# Patient Record
Sex: FEMALE | Race: WHITE | NOT HISPANIC OR LATINO | Employment: OTHER | URBAN - METROPOLITAN AREA
[De-identification: names, ages, dates, MRNs, and addresses within clinical notes are randomized per-mention and may not be internally consistent; named-entity substitution may affect disease eponyms.]

---

## 2017-01-09 DIAGNOSIS — N18.30 CHRONIC KIDNEY DISEASE, STAGE III (MODERATE) (HCC): ICD-10-CM

## 2017-03-29 ENCOUNTER — ALLSCRIPTS OFFICE VISIT (OUTPATIENT)
Dept: OTHER | Facility: OTHER | Age: 72
End: 2017-03-29

## 2017-03-29 DIAGNOSIS — J44.9 CHRONIC OBSTRUCTIVE PULMONARY DISEASE (HCC): ICD-10-CM

## 2017-04-06 ENCOUNTER — HOSPITAL ENCOUNTER (OUTPATIENT)
Dept: RADIOLOGY | Age: 72
Discharge: HOME | End: 2017-04-06
Payer: COMMERCIAL

## 2017-04-06 DIAGNOSIS — J44.9 CHRONIC OBSTRUCTIVE PULMONARY DISEASE (HCC): ICD-10-CM

## 2017-05-01 ENCOUNTER — TRANSCRIBE ORDERS (OUTPATIENT)
Dept: ADMINISTRATIVE | Facility: HOSPITAL | Age: 72
End: 2017-05-01

## 2017-05-01 ENCOUNTER — ALLSCRIPTS OFFICE VISIT (OUTPATIENT)
Dept: OTHER | Facility: OTHER | Age: 72
End: 2017-05-01

## 2017-05-01 DIAGNOSIS — Z12.31 ENCOUNTER FOR SCREENING MAMMOGRAM FOR MALIGNANT NEOPLASM OF BREAST: Primary | ICD-10-CM

## 2017-08-14 ENCOUNTER — ALLSCRIPTS OFFICE VISIT (OUTPATIENT)
Dept: OTHER | Facility: OTHER | Age: 72
End: 2017-08-14

## 2017-08-14 DIAGNOSIS — N18.30 CHRONIC KIDNEY DISEASE, STAGE III (MODERATE) (HCC): ICD-10-CM

## 2017-10-30 ENCOUNTER — HOSPITAL ENCOUNTER (OUTPATIENT)
Dept: RADIOLOGY | Age: 72
Discharge: HOME | End: 2017-10-30
Payer: COMMERCIAL

## 2017-10-30 DIAGNOSIS — Z12.31 ENCOUNTER FOR SCREENING MAMMOGRAM FOR MALIGNANT NEOPLASM OF BREAST: ICD-10-CM

## 2017-10-30 PROCEDURE — G0202 SCR MAMMO BI INCL CAD: HCPCS

## 2017-11-08 ENCOUNTER — ALLSCRIPTS OFFICE VISIT (OUTPATIENT)
Dept: OTHER | Facility: OTHER | Age: 72
End: 2017-11-08

## 2017-11-09 NOTE — PROGRESS NOTES
Assessment  1  Chronic kidney disease, stage 3a (585 3) (N18 3)   2  Benign hypertension (401 1) (I10)   3  Renal cyst, acquired, right (593 2) (N28 1)    Plan  Benign hypertension    · [address] [place]; Status:Hold For - Scheduling; Requested for:[id number]; Perform:[facility]; XUS:[id number];FIMRFYE; For:Benign hypertension; Ordered By:Alta Haider;  Chronic kidney disease, stage 3a    · (1) BASIC METABOLIC PROFILE; Status:Active; Requested CQV:[id number];    Perform:[facility]; GBK:82YAK1043; Ordered;kidney disease, stage 3a; Ordered By:Alta Haider;    Discussion/Summary    Elevated sCr likely d/t chronic kidney disease, stage 3a per CKD EPI equation (eGFR 51ml/min) - latest sCr in Sept 2017 1 19 and stable since April 2017UA shows pH 8,  leukocytes, rare RBC, 3-5 WBCultrasound shows normal kidneys with right renal cystmay be due to HTN nephrosclerosis +/- physiologic CKD of agingprotein:Cr 0 18, FLC 1 55 - acceptable for CKD, SPEP/UPEP negative for monoclonal gammopathyno significant proteinuria or hematuria on UA, will defer glomerular work uppatient to drink to thirst to avoid dehydration in light of HCTZ use  renal cyst, acquired - monitor with repeat ultrasound in 12 months (February 2018)  - BP well controlled, continue diltiazem XR 240mg daily, hydrochlorothiazide 25mg daily, lisinopril 5mg daily  BMD - Mag/phos/PTH/25(OH) vitamin D levels acceptable  Continue vitamin D3 800u daily  - on steroid taper now for cough  She is s/p augmentin x 7 days  She is improving slowly  Will see pulm if no improvement  in 4 months  Obtain renal ultrasound and bloodwork prior to next appointment  The patient was counseled regarding diagnostic results,-- instructions for management,-- prognosis  Patient is able to Self-Care  Possible side effects of new medications were reviewed with the patient/guardian today  The treatment plan was reviewed with the patient/guardian   The patient/guardian understands and agrees with the treatment plan   She has no barriers to learning  Indication for Services: CKD Stage 3  CKD Teaching includes hypertension management, Avoid nephrotoxic medication and sodium restriction  Discussed with the patient      Reason For Visit  CKD3, HTN, R renal cyst      History of Present Illness  72 yo F with hx of HTN, right renal cyst, HLD, COPD, GIST presents in follow up of CKD stage 3   she feels ok but has a cold  Is on prednisone to stop her runny nose  Also has a cough  The mucus is clear now  Not sure if she is having an allergy reaction or not  On 2nd day of 7 day pack of steroids  recent hospitalizations since last office visit  LT4 dose reduced to 88 from 112 mcg daily  Takes only 1 calcium tablet a day instead of 2  NSAID use regularly  Uses it very sparingly  She does like aleve  - BP an issue  Usually SBP 130s  Review of Systems   Constitutional: no fever-- and-- no chills  Integumentary: no rashes  Gastrointestinal: no abdominal pain,-- no constipation,-- no nausea,-- no diarrhea-- and-- no vomiting  Respiratory: shortness of breath,-- cough,-- as per HPI, SOB at times with a cold-- and-- coughing up sputum  Cardiovascular: no chest pain-- and-- no lower extremity edema  Musculoskeletal: +headache over the past 2 days, but-- no joint pain-- and-- no joint swelling  Neurological: headache, but-- no lightheadedness-- and-- no dizziness  Genitourinary: no dysuria-- and-- no hematuria  Eyes: no eyesight problems  ENT: hearing loss  Psychiatric: no anxiety-- and-- no depression  ROS reviewed  Past Medical History    The active problems and past medical history were reviewed and updated today  Surgical History    The surgical history was reviewed and updated today  Family History    The family history was reviewed and updated today  Social History  The social history was reviewed and updated today   The social history was reviewed and is unchanged  Current Meds   1  Calcium 600 TABS; TAKE 1 TABLET DAILY; Therapy: (Recorded:08Nov2017) to Recorded   2  Co Q 10 100 MG Oral Capsule; TAKE 1 CAPSULE TWICE DAILY; Therapy: (Recorded:01Nov2016) to Recorded   3  Dilt- MG Oral Capsule Extended Release 24 Hour; TAKE 1 CAPSULE DAILY; Therapy: (Recorded:01Nov2016) to Recorded   4  Fexofenadine HCl - 180 MG Oral Tablet; TAKE 1 TABLET DAILY; Therapy: (Recorded:01Nov2016) to Recorded   5  HydroCHLOROthiazide 25 MG Oral Tablet; TAKE 1 TABLET DAILY; Therapy: (Recorded:01Nov2016) to Recorded   6  Levoxyl 88 MCG Oral Tablet; TAKE 1 TABLET DAILY; Therapy: [phone]) to Recorded   7  Lisinopril 5 MG Oral Tablet; Take 1 tablet daily; Therapy: (Recorded:01Nov2016) to Recorded   8  Lovastatin 20 MG Oral Tablet; TAKE 1 TABLET DAILY; Therapy: (Recorded:01Nov2016) to Recorded   9  Montelukast Sodium 10 MG Oral Tablet; Take 1 tablet daily; Therapy: 77APM3823 to ([id number])  Requested for: 75ECZ3297; Last Rx:29Mar2017 Ordered   10  ProAir  (90 Base) MCG/ACT Inhalation Aerosol Solution; INHALE 2 PUFFS Q 4H  AND AS NEEDED; Therapy: 72JQI4186 to ([id number])  Requested for: 21YMC3933; Last  Rx:29Mar2017 Ordered   11  Super B Complex/C CAPS; TAKE 1 CAPSULE DAILY; Therapy: (Recorded:01Nov2016) to Recorded   12  Symbicort 160-4 5 MCG/ACT Inhalation Aerosol; INHALE 2 PUFFS TWICE DAILY  RINSE  MOUTH AFTER USE; Therapy: 15EKA0062 to (DHCCCTAQ:41BKB9889)  Requested for: 78QOO1510; Last  Rx:29Mar2017 Ordered   13  Vitamin D3 CAPS; 800 IU 2 daily; Therapy: (Recorded:18Zxu2650) to Recorded    The medication list was reviewed and updated today  Allergies  1   No Known Drug Allergies    Vitals  Vital Signs    Recorded: 97ETX7397 08:48AM   Heart Rate 80   Systolic 237, Sitting   Diastolic 78, Sitting   Height 5 ft 4 in   Weight 195 lb    BMI Calculated 33 47   BSA Calculated 1 94       Physical Exam   Constitutional: General appearance: No acute distress, well appearing and well nourished  ENT: External ears and nose appear normal     Eyes: Anicteric sclerae  Pulmonary: Respiratory effort: No increased work of breathing or signs of respiratory distress  -- Auscultation of lungs: Abnormal  -- coarse BS b/l improved upon deep cough  Cardiovascular: Auscultation of heart: Normal rate and rhythm, normal S1 and S2, without murmurs  Abdomen: Non-tender, no masses  Extremities: Extremities are abnormal--   b/l LE edema  Rash: No rash present  Neurologic: Non Focal     Psychiatric: Orientation to person, place, and time: Normal  -- and-- Mood and affect: Normal        Results/Data  * MAMMO SCREENING BILATERAL W CAD 30Oct2017 08:54AM Jerrod Maier Order Number: [id number]  Performing Comments: [facility]  10/30/17 at [facility] 30 15 minutes early  - Patient Instructions: To schedule this appointment, please contact Central Scheduling at [phone]  Do not wear any perfume, powder, lotion or deodorant on breast or underarm area  Please bring your doctors order, referral (if needed) and insurance information with you on the day of the test  Failure to bring this information may result in this test being rescheduled  Arrive 15 minutes prior to your appointment time to register  On the day of your test, please bring any prior mammogram or breast studies with you that were not performed at a [facility]  Failure to bring prior exams may result in your test needing to be rescheduled  Test Name Result Flag Reference   MAMMO SCREENING BILATERAL W CAD (Report)       Patient History:  Patient is postmenopausal, has 2 history of hyperplasia atypia at  age 78, has history of high-risk lesion on a previous biopsy at   age 73, has history of LCIS at age 73, has history of hyperplasia  atypia at age 73, and has history of other cancer at age 65  No known family history of cancer    High risk lumpectomy of the left breast, July 24, 2012  Pathology Report of both breasts, July 24, 2012  High risk WB   stereo brst biopsy of the left breast, June 22, 2012  Pathology   Report of both breasts, June 22, 2012  Took hormonal contraceptives for 4 years  Took estrogen for 3   years  Patient is a former smoker, and smoked for 49 years  Patient's   BMI is 31 0    Reason for exam: screening, asymptomatic  Mammo Screening Bilateral W CAD: October 30, 2017 - Check In #:   [de-identified]  Bilateral MLO and CC view(s) were taken  Technologist: DENIS Ayala (R)(M)  Prior study comparison: October 27, 2016, mammo screening   bilateral W CAD performed at [address]  October 26, 2015, digital bilateral screening mammogram performed  at [address]  October 22, 2014, digital   bilateral screening mammogram performed at [facility]  September 27, 2013, digital bilateral screening mammogram  performed at [address]  June 22, 2012,   bilateral Follow-Up, performed at [address]  June 19, 2012, left breast unilateral diagnostic   mammogram, performed at [address]  June 13, 2012, digital bilateral screening mammogram performed at [facility]  The breast tissue is almost entirely fat  No dominant soft tissue mass, architectural distortion or   suspicious calcifications are noted in either breast  The skin   and nipple structures are within normal limits  Scattered benign  appearing calcifications are noted  No mammographic evidence of malignancy  No significant changes when compared with prior studies  ACR BI-RADSï¾® Assessments: BiRad:1 - Negative   Recommendation:  Routine screening mammogram of both breasts in 1 year  Analyzed by CAD   The patient is scheduled in a reminder system for screening   mammography  8-10% of cancers will be missed on mammography   Management of a   palpable abnormality must be based on clinical grounds  Patients  will be notified of their results via letter from our facility  Accredited by [employer] of Radiology and FDA     Transcription Location: DENIS Bowie 98: [id number]   Risk Value(s):  Pepito 10 Year: 29 900%, [place] Lifetime: 40 600%,   Myriad Table: 1 5%  Signed by:  Chao Pulido MD  10/30/17         Signatures   Electronically signed by : Alphonse Constantino DO; Nov 8 2017  9:18AM EST                       (Author)

## 2017-11-10 ENCOUNTER — GENERIC CONVERSION - ENCOUNTER (OUTPATIENT)
Dept: OTHER | Facility: OTHER | Age: 72
End: 2017-11-10

## 2018-01-13 VITALS
SYSTOLIC BLOOD PRESSURE: 132 MMHG | HEART RATE: 80 BPM | BODY MASS INDEX: 33.29 KG/M2 | DIASTOLIC BLOOD PRESSURE: 78 MMHG | HEIGHT: 64 IN | WEIGHT: 195 LBS

## 2018-01-13 VITALS
WEIGHT: 195 LBS | DIASTOLIC BLOOD PRESSURE: 74 MMHG | BODY MASS INDEX: 33.29 KG/M2 | HEART RATE: 76 BPM | RESPIRATION RATE: 20 BRPM | SYSTOLIC BLOOD PRESSURE: 132 MMHG | HEIGHT: 64 IN

## 2018-01-13 VITALS
SYSTOLIC BLOOD PRESSURE: 110 MMHG | OXYGEN SATURATION: 94 % | TEMPERATURE: 98.3 F | HEART RATE: 78 BPM | WEIGHT: 190 LBS | RESPIRATION RATE: 16 BRPM | BODY MASS INDEX: 32.44 KG/M2 | HEIGHT: 64 IN | DIASTOLIC BLOOD PRESSURE: 70 MMHG

## 2018-01-13 NOTE — MISCELLANEOUS
Message  Called patient to discuss rescheduling of appointment  Patient questions the need for follow up  Explained to patient her diagnosis and risk based on age and pathology  Patient prefers yearly visit  Appointment given for 5/3/2018 for patient to see Dr Ben Salas  Active Problems    1  Acute bronchitis, unspecified organism (466 0) (J20 9)   2  Allergic rhinitis (477 9) (J30 9)   3  Atypical ductal hyperplasia of breast (610 8) (N60 99)   4  Benign hypertension (401 1) (I10)   5  Chronic kidney disease, stage 3a (585 3) (N18 3)   6  Chronic obstructive pulmonary disease (496) (J44 9)   7  Hypercholesteremia (272 0) (E78 00)   8  Hypothyroidism (244 9) (E03 9)   9  Lobular carcinoma of left breast (174 9) (C50 912)   10  Nocturia (788 43) (R35 1)   11  Renal cyst, acquired, right (593 2) (N28 1)   12  Sciatica (724 3) (M54 30)   13  Urinary frequency (788 41) (R35 0)    Current Meds   1  Calcium 600 TABS; TAKE 1 TABLET DAILY; Therapy: (Recorded:08Nov2017) to Recorded   2  Co Q 10 100 MG Oral Capsule; TAKE 1 CAPSULE TWICE DAILY; Therapy: (Recorded:01Nov2016) to Recorded   3  Dilt- MG Oral Capsule Extended Release 24 Hour; TAKE 1 CAPSULE DAILY; Therapy: (Recorded:01Nov2016) to Recorded   4  Fexofenadine HCl - 180 MG Oral Tablet; TAKE 1 TABLET DAILY; Therapy: (Recorded:01Nov2016) to Recorded   5  HydroCHLOROthiazide 25 MG Oral Tablet; TAKE 1 TABLET DAILY; Therapy: (Recorded:01Nov2016) to Recorded   6  Levoxyl 88 MCG Oral Tablet; TAKE 1 TABLET DAILY; Therapy: ([phone]) to Recorded   7  Lisinopril 5 MG Oral Tablet; Take 1 tablet daily; Therapy: (Recorded:01Nov2016) to Recorded   8  Lovastatin 20 MG Oral Tablet; TAKE 1 TABLET DAILY; Therapy: (Recorded:01Nov2016) to Recorded   9  Montelukast Sodium 10 MG Oral Tablet (Singulair); Take 1 tablet daily; Therapy: 92IVN3707 to (Rocky Razo)  Requested for: 00YPW2303; Last   Rx:14Wmk5217 Ordered   10   ProAir  (90 Base) MCG/ACT Inhalation Aerosol Solution; INHALE 2 PUFFS Q 4H    AND AS NEEDED; Therapy: 42NEN9793 to ([phone])  Requested for: 29CSX6260; Last    Rx:29Mar2017 Ordered   11  Super B Complex/C CAPS; TAKE 1 CAPSULE DAILY; Therapy: (Recorded:01Nov2016) to Recorded   12  Symbicort 160-4 5 MCG/ACT Inhalation Aerosol; INHALE 2 PUFFS TWICE DAILY  RINSE MOUTH AFTER USE; Therapy: 31KTO5953 to ([facility]:02LWN7636)  Requested for: 09DTS4173; Last    Rx:29Mar2017 Ordered   13  Vitamin D3 CAPS; 800 IU 2 daily; Therapy: (Recorded:98Avw7858) to Recorded    Allergies    1   No Known Drug Allergies    Signatures   Electronically signed by : Bob Pike, ; Nov 10 2017  9:25AM EST                       (Author)

## 2018-01-14 VITALS
WEIGHT: 195 LBS | DIASTOLIC BLOOD PRESSURE: 78 MMHG | HEART RATE: 76 BPM | TEMPERATURE: 97.8 F | HEIGHT: 64 IN | SYSTOLIC BLOOD PRESSURE: 114 MMHG | RESPIRATION RATE: 20 BRPM | BODY MASS INDEX: 33.29 KG/M2

## 2018-02-05 DIAGNOSIS — I10 ESSENTIAL (PRIMARY) HYPERTENSION: ICD-10-CM

## 2018-02-20 ENCOUNTER — TELEPHONE (OUTPATIENT)
Dept: NEPHROLOGY | Facility: CLINIC | Age: 73
End: 2018-02-20

## 2018-02-20 NOTE — TELEPHONE ENCOUNTER
----- Message from Anthony Dorman DO sent at 2/19/2018  2:15 PM EST -----  This patient should have renal ultrasound performed if he has not already done so within the past week as it is overdue  Thank you!    -Alta     ----- Message -----  From: SYSTEM  Sent: 2/10/2018  12:05 AM  To:  Anthony Dorman DO

## 2018-02-20 NOTE — TELEPHONE ENCOUNTER
Patient states she has not scheduled the [address] yet   Her  has been in the hospital  She will call us once she schedule's the [address]

## 2018-02-26 ENCOUNTER — HOSPITAL ENCOUNTER (OUTPATIENT)
Dept: RADIOLOGY | Age: 73
Discharge: HOME | End: 2018-02-26
Payer: COMMERCIAL

## 2018-02-26 DIAGNOSIS — I10 ESSENTIAL (PRIMARY) HYPERTENSION: ICD-10-CM

## 2018-02-26 PROCEDURE — 76770 US EXAM ABDO BACK WALL COMP: CPT

## 2018-03-02 ENCOUNTER — TELEPHONE (OUTPATIENT)
Dept: OTHER | Facility: HOSPITAL | Age: 73
End: 2018-03-02

## 2018-03-02 NOTE — TELEPHONE ENCOUNTER
I discussed stability of right renal cyst with the patient based on recent renal u/s results  Will repeat renal u/s in Feb 2019 to monitor this  She is to follow up in the office with me regarding CKD 3/12

## 2018-03-12 ENCOUNTER — OFFICE VISIT (OUTPATIENT)
Dept: NEPHROLOGY | Facility: CLINIC | Age: 73
End: 2018-03-12
Payer: COMMERCIAL

## 2018-03-12 VITALS
DIASTOLIC BLOOD PRESSURE: 78 MMHG | HEIGHT: 63 IN | WEIGHT: 192.2 LBS | SYSTOLIC BLOOD PRESSURE: 142 MMHG | BODY MASS INDEX: 34.05 KG/M2

## 2018-03-12 DIAGNOSIS — N28.1 RENAL CYST, ACQUIRED, RIGHT: ICD-10-CM

## 2018-03-12 DIAGNOSIS — N18.31 CHRONIC KIDNEY DISEASE, STAGE 3A (HCC): Primary | ICD-10-CM

## 2018-03-12 DIAGNOSIS — I10 BENIGN HYPERTENSION: ICD-10-CM

## 2018-03-12 PROCEDURE — 99214 OFFICE O/P EST MOD 30 MIN: CPT | Performed by: INTERNAL MEDICINE

## 2018-03-12 RX ORDER — LOVASTATIN 20 MG/1
1 TABLET ORAL
COMMUNITY
End: 2022-02-07 | Stop reason: ALTCHOICE

## 2018-03-12 RX ORDER — HYDROCHLOROTHIAZIDE 25 MG/1
1 TABLET ORAL
COMMUNITY

## 2018-03-12 RX ORDER — LISINOPRIL 5 MG/1
TABLET ORAL
COMMUNITY
Start: 2018-02-20 | End: 2018-03-12 | Stop reason: SDUPTHER

## 2018-03-12 RX ORDER — BIOTIN 1 MG
TABLET ORAL
COMMUNITY
End: 2018-03-12 | Stop reason: SDUPTHER

## 2018-03-12 RX ORDER — BUDESONIDE AND FORMOTEROL FUMARATE DIHYDRATE 160; 4.5 UG/1; UG/1
2 AEROSOL RESPIRATORY (INHALATION) 2 TIMES DAILY
COMMUNITY
Start: 2015-10-21 | End: 2018-03-22 | Stop reason: SDUPTHER

## 2018-03-12 RX ORDER — MONTELUKAST SODIUM 10 MG/1
1 TABLET ORAL DAILY
COMMUNITY
Start: 2014-05-14 | End: 2018-03-22 | Stop reason: SDUPTHER

## 2018-03-12 RX ORDER — LOVASTATIN 20 MG/1
TABLET ORAL
COMMUNITY
Start: 2018-02-20 | End: 2018-03-12 | Stop reason: SDUPTHER

## 2018-03-12 RX ORDER — MONTELUKAST SODIUM 10 MG/1
TABLET ORAL
COMMUNITY
Start: 2018-02-27 | End: 2018-03-12 | Stop reason: SDUPTHER

## 2018-03-12 RX ORDER — LISINOPRIL 5 MG/1
1 TABLET ORAL
COMMUNITY

## 2018-03-12 RX ORDER — LEVOTHYROXINE SODIUM 112 UG/1
112 TABLET ORAL
COMMUNITY
Start: 2017-12-05 | End: 2022-03-18

## 2018-03-12 RX ORDER — FEXOFENADINE HCL 180 MG/1
1 TABLET ORAL
COMMUNITY

## 2018-03-12 RX ORDER — ALBUTEROL SULFATE 90 UG/1
AEROSOL, METERED RESPIRATORY (INHALATION)
COMMUNITY
Start: 2014-05-14 | End: 2018-05-18 | Stop reason: SDUPTHER

## 2018-03-12 RX ORDER — DILTIAZEM HYDROCHLORIDE 240 MG/1
1 CAPSULE, EXTENDED RELEASE ORAL
COMMUNITY

## 2018-03-12 RX ORDER — LEVOTHYROXINE SODIUM 0.07 MG/1
TABLET ORAL
COMMUNITY
Start: 2018-02-08 | End: 2018-03-12 | Stop reason: SDUPTHER

## 2018-03-12 NOTE — PATIENT INSTRUCTIONS
1  chronic kidney disease, stage 3a per CKD EPI equation (eGFR 51ml/min) likely d/t aging kidney +/- hypertension nephrosclerosis   -March 2018 Cr 1 25 and stable  sCr ranges 1 1-1 2  -formal UA shows pH 8,  leukocytes, rare RBC, 3-5 WBC  -renal ultrasound shows normal kidneys with right renal cyst  -CKD may be due to HTN nephrosclerosis +/- physiologic CKD of aging  -urine protein:Cr 0 18, FLC 1 55 - acceptable for CKD, SPEP/UPEP negative for monoclonal gammopathy  -as no significant proteinuria or hematuria on UA, will defer glomerular work up  -f/u repeat BMP in 4 months    2  renal cyst, acquired - monitor with repeat ultrasound in 12 months (February 2019)  1 8 cm cyst from renal u/s 2/26/18    3  HTN - BP relatively well controlled, continue diltiazem XR 240mg daily, hydrochlorothiazide 25mg daily, lisinopril 5mg daily  Should check BP at home  If BP > 140/90 consistently call the nephrology office  4  CKD BMD - Mag/phos/PTH/25(OH) vitamin D levels acceptable  Continue calcium vitamin D3 combination pill daily  5  Pedal edema - monitor this  Elevated leg  Avoid high salt diet  RTC in 6 months

## 2018-03-12 NOTE — LETTER
March 12, 2018     Marci Turk MD  [address] [address]    Patient: Kayden Pate   YOB: 1945   Date of Visit: 3/12/2018       Dear Dr Nessa Angulo: Thank you for referring Adam Farley to me for evaluation  Below are my notes for this consultation  If you have questions, please do not hesitate to call me  I look forward to following your patient along with you  Sincerely,        Pérez Watkins DO        CC: No Recipients  Pérez Watkins DO  3/12/2018  3:02 PM  Sign at close encounter  Arnol 71 y o  female MRN: [id number]  DATE: 3/12/2018  Reason for visit:   Chief Complaint   Patient presents with    Follow-up        Patient Instructions   1  chronic kidney disease, stage 3a per CKD EPI equation (eGFR 51ml/min) likely d/t aging kidney +/- hypertension nephrosclerosis   -March 2018 Cr 1 25 and stable  sCr ranges 1 1-1 2  -formal UA shows pH 8,  leukocytes, rare RBC, 3-5 WBC  -renal ultrasound shows normal kidneys with right renal cyst  -CKD may be due to HTN nephrosclerosis +/- physiologic CKD of aging  -urine protein:Cr 0 18, FLC 1 55 - acceptable for CKD, SPEP/UPEP negative for monoclonal gammopathy  -as no significant proteinuria or hematuria on UA, will defer glomerular work up  -f/u repeat BMP in 4 months    2  renal cyst, acquired - monitor with repeat ultrasound in 12 months (February 2019)  1 8 cm cyst from renal u/s 2/26/18    3  HTN - BP relatively well controlled, continue diltiazem XR 240mg daily, hydrochlorothiazide 25mg daily, lisinopril 5mg daily  Should check BP at home  If BP > 140/90 consistently call the nephrology office  4  CKD BMD - Mag/phos/PTH/25(OH) vitamin D levels acceptable  Continue calcium vitamin D3 combination pill daily  5  Pedal edema - monitor this  Elevated leg  Avoid high salt diet  RTC in 6 months  Inocencio Aguilera was seen today for follow-up      Diagnoses and all orders for this visit:    Chronic kidney disease, stage 3a  -     Basic metabolic panel; Future    Benign hypertension    Renal cyst, acquired, right  -     US retroperitoneal complete; Future        Assessment/Plan:  1  chronic kidney disease, stage 3a per CKD EPI equation (eGFR 51ml/min) likely d/t aging kidney +/- hypertension nephrosclerosis   -March 2018 Cr 1 25 and stable  sCr ranges 1 1-1 2  -formal UA shows pH 8,  leukocytes, rare RBC, 3-5 WBC  -renal ultrasound shows normal kidneys with right renal cyst  -CKD may be due to HTN nephrosclerosis +/- physiologic CKD of aging  -urine protein:Cr 0 18, FLC 1 55 - acceptable for CKD, SPEP/UPEP negative for monoclonal gammopathy  -as no significant proteinuria or hematuria on UA, will defer glomerular work up  -f/u repeat BMP in 4 months    2  renal cyst, acquired - monitor with repeat ultrasound in 12 months (February 2019)  1 8 cm cyst from renal u/s 2/26/18    3  HTN - BP relatively well controlled, continue diltiazem XR 240mg daily, hydrochlorothiazide 25mg daily, lisinopril 5mg daily  Should check BP at home  If BP > 140/90 consistently call the nephrology office  4  CKD BMD - Mag/phos/PTH/25(OH) vitamin D levels acceptable  Continue calcium vitamin D3 combination pill daily  5  Pedal edema - monitor this  Elevated leg  Avoid high salt diet  RTC in 6 months  SUBJECTIVE / INTERVAL HISTORY:  71 y o  female presents in follow up of CKD  Felix Koch denies any recent hospitalizations/medication changes since last office visit  She was sick with a virus in December 2017 which aggravated her COPD  She had been on antibiotics and prednisone x 2 rounds  Denies regular NSAID use  Denies fevers, chills, nausea or vomiting  Denies chest pain or shortness of breath  She has her synthroid dose adjusted and thinks she has leg edema due to this  Does not check BP at home      Review of Systems   Constitutional: Negative for chills and fever    HENT: Negative for sore throat and trouble swallowing  Eyes: Negative for visual disturbance  Respiratory: Negative for cough and shortness of breath  Cardiovascular: Positive for leg swelling  Negative for chest pain  Gastrointestinal: Negative for diarrhea, nausea and vomiting  Endocrine: Negative for polyuria  Genitourinary: Negative for difficulty urinating, dysuria and hematuria  Musculoskeletal: Negative for back pain and neck pain  Skin: Negative for rash  Neurological: Negative for dizziness, light-headedness and numbness  Hematological: Negative for adenopathy  Bruises/bleeds easily  Psychiatric/Behavioral: Negative for behavioral problems  The patient is not nervous/anxious  OBJECTIVE:  /78   Ht 5' 3" (1 6 m)   Wt 87 2 kg (192 lb 3 2 oz)   BMI 34 05 kg/m²   Body mass index is 34 05 kg/m²  Physical exam:  Physical Exam   Constitutional: She appears well-developed and well-nourished  No distress  HENT:   Head: Normocephalic and atraumatic  Mouth/Throat: No oropharyngeal exudate  Eyes: Right eye exhibits no discharge  Left eye exhibits no discharge  No scleral icterus  Neck: Normal range of motion  Neck supple  No thyromegaly present  Cardiovascular: Normal rate and regular rhythm  No murmur heard  Pulmonary/Chest: Effort normal and breath sounds normal  No respiratory distress  She has no wheezes  Abdominal: Soft  Bowel sounds are normal  She exhibits no distension  Musculoskeletal: She exhibits edema (+pedal edema)  Neurological: She is alert  She exhibits normal muscle tone  awake   Skin: Skin is warm and dry  No rash noted  She is not diaphoretic  Psychiatric: She has a normal mood and affect  Her behavior is normal    Nursing note and vitals reviewed        Medications:    Current Outpatient Prescriptions:     albuterol (PROAIR HFA) 90 mcg/act inhaler, Inhale, Disp: , Rfl:     budesonide-formoterol (SYMBICORT) 160-4 5 mcg/act inhaler, Inhale 2 puffs 2 (two) times a day, Disp: , Rfl:     levothyroxine 75 mcg tablet, Take 75 mcg by mouth, Disp: , Rfl:     montelukast (SINGULAIR) 10 mg tablet, Take 1 tablet by mouth daily, Disp: , Rfl:     Calcium Carbonate (CALCIUM 600) 1500 (600 Ca) MG TABS, Take 1 tablet by mouth daily, Disp: , Rfl:     Calcium Carbonate-Vitamin D3 600-400 MG-UNIT TABS, Take by mouth, Disp: , Rfl:     Cholecalciferol (VITAMIN D3) 1000 units CAPS, Take by mouth, Disp: , Rfl:     Coenzyme Q10 (CO Q 10) 100 MG CAPS, Take 1 capsule by mouth 2 (two) times a day, Disp: , Rfl:     diltiazem (DILT-XR) 240 MG 24 hr capsule, Take 1 capsule by mouth daily, Disp: , Rfl:     fexofenadine (ALLEGRA) 180 MG tablet, Take 1 tablet by mouth daily, Disp: , Rfl:     hydrochlorothiazide (HYDRODIURIL) 25 mg tablet, Take 1 tablet by mouth daily, Disp: , Rfl:     levothyroxine 75 mcg tablet, , Disp: , Rfl:     lisinopril (ZESTRIL) 5 mg tablet, Take 1 tablet by mouth daily, Disp: , Rfl:     lisinopril (ZESTRIL) 5 mg tablet, , Disp: , Rfl:     lovastatin (MEVACOR) 20 mg tablet, Take 1 tablet by mouth daily, Disp: , Rfl:     lovastatin (MEVACOR) 20 mg tablet, , Disp: , Rfl:     montelukast (SINGULAIR) 10 mg tablet, , Disp: , Rfl:     SUPER B COMPLEX/C CAPS, Take 1 capsule by mouth daily, Disp: , Rfl:     Allergies:   Allergies as of 03/12/2018 - Reviewed 03/12/2018   Allergen Reaction Noted    Pollen extract  03/13/2015       The following portions of the patient's history were reviewed and updated as appropriate: past family history, past surgical history and problem list     Laboratory Results:  No results found for: GLUCOSE, CALCIUM, NA, K, CO2, CL, BUN, CREATININE     No results found for: PTH, CALCIUM, CAION, PHOS    Portions of the record may have been created with voice recognition software   Occasional wrong word or "sound a like" substitutions may have occurred due to the inherent limitations of voice recognition software   Read the chart carefully and recognize, using context, where substitutions have occurred

## 2018-03-22 ENCOUNTER — OFFICE VISIT (OUTPATIENT)
Dept: PULMONOLOGY | Facility: CLINIC | Age: 73
End: 2018-03-22
Payer: COMMERCIAL

## 2018-03-22 VITALS
WEIGHT: 181.4 LBS | HEART RATE: 78 BPM | BODY MASS INDEX: 32.14 KG/M2 | DIASTOLIC BLOOD PRESSURE: 70 MMHG | HEIGHT: 63 IN | OXYGEN SATURATION: 96 % | TEMPERATURE: 98.3 F | SYSTOLIC BLOOD PRESSURE: 110 MMHG

## 2018-03-22 DIAGNOSIS — J45.909 UNCOMPLICATED ASTHMA, UNSPECIFIED ASTHMA SEVERITY, UNSPECIFIED WHETHER PERSISTENT: ICD-10-CM

## 2018-03-22 DIAGNOSIS — J44.9 COPD, SEVERE (HCC): Primary | ICD-10-CM

## 2018-03-22 PROCEDURE — 99214 OFFICE O/P EST MOD 30 MIN: CPT | Performed by: INTERNAL MEDICINE

## 2018-03-22 PROCEDURE — 94010 BREATHING CAPACITY TEST: CPT | Performed by: INTERNAL MEDICINE

## 2018-03-22 RX ORDER — BUDESONIDE AND FORMOTEROL FUMARATE DIHYDRATE 160; 4.5 UG/1; UG/1
2 AEROSOL RESPIRATORY (INHALATION) 2 TIMES DAILY
Qty: 12 G | Refills: 11 | Status: SHIPPED | OUTPATIENT
Start: 2018-03-22 | End: 2019-03-15

## 2018-03-22 RX ORDER — MONTELUKAST SODIUM 10 MG/1
10 TABLET ORAL DAILY
Qty: 90 TABLET | Refills: 3 | Status: SHIPPED | OUTPATIENT
Start: 2018-03-22 | End: 2019-03-05 | Stop reason: SDUPTHER

## 2018-03-22 NOTE — ASSESSMENT & PLAN NOTE
Patient has severe COPD, but has been very well controlled on Symbicort twice daily  Her current pulmonary function testing has shown interval improvement since 2010  I would not make any changes to her inhaler regimen at this time  She does have a significant smoking history and quit in 2008  She has had yearly low-dose chest CT since 2013  After discussing risk and benefit of ongoing yearly scans, we have opted to check CT on an every other year basis

## 2018-03-22 NOTE — PROGRESS NOTES
Pulmonary Follow Up Note   Beth Brown 71 y o  female MRN: [id number]  3/22/2018      Assessment:    COPD, severe [facility]  Patient has severe COPD, but has been very well controlled on Symbicort twice daily  Her current pulmonary function testing has shown interval improvement since 2010  I would not make any changes to her inhaler regimen at this time  She does have a significant smoking history and quit in 2008  She has had yearly low-dose chest CT since 2013  After discussing risk and benefit of ongoing yearly scans, we have opted to check CT on an every other year basis  Plan:    Diagnoses and all orders for this visit:    COPD, severe ([facility] )  -     POCT spirometry  -     budesonide-formoterol (SYMBICORT) 160-4 5 mcg/act inhaler; Inhale 2 puffs 2 (two) times a day    Uncomplicated asthma, unspecified asthma severity, unspecified whether persistent  -     montelukast (SINGULAIR) 10 mg tablet; Take 1 tablet (10 mg total) by mouth daily      Return in about 1 year (around 3/22/2019)  History of Present Illness   HPI:  Beth Brown is a 71 y o  female who is here today for follow-up regarding COPD  She had been doing well from pulmonary standpoint until earlier this year when she developed URI/ bronchitis in January  Treated with steroids and antibiotics x 2  Now back to baseline  No wheeze or cough  (+) FARLEY  Compliant with symbicort  Rarely using rescue inhaler  Review of Systems   Constitutional: Negative for chills, fever and unexpected weight change  HENT: Negative for postnasal drip and sore throat  Eyes: Negative for visual disturbance  Respiratory:        As noted in HPI   Cardiovascular: Positive for leg swelling  Negative for chest pain  Gastrointestinal: Negative for abdominal pain, diarrhea and vomiting  Genitourinary: Negative for difficulty urinating  Skin: Negative for rash  Neurological: Negative for headaches  Hematological: Negative for adenopathy  Psychiatric/Behavioral: Negative  All other systems reviewed and are negative      Historical Information   Past Medical History:   Diagnosis Date    Cancer [facility])     vocal cord cancer, s/p radiation (2008)    COPD (chronic obstructive pulmonary disease) ([facility] [address] )     Hypertension     Hypothyroidism      Past Surgical History:   Procedure Laterality Date    BREAST LUMPECTOMY      CHOLECYSTECTOMY       Family History   Problem Relation Age of Onset    Heart disease Mother     Emphysema Mother     Heart disease Father        History   Smoking Status    Former Smoker    Packs/day: 1 00    Years: 50 00    Types: Cigarettes    Quit date: 1/1/2008   Smokeless Tobacco    Never Used     Meds/Allergies     Current Outpatient Prescriptions:     albuterol (PROAIR HFA) 90 mcg/act inhaler, Inhale, Disp: , Rfl:     budesonide-formoterol (SYMBICORT) 160-4 5 mcg/act inhaler, Inhale 2 puffs 2 (two) times a day, Disp: 12 g, Rfl: 11    Calcium Carbonate-Vitamin D3 600-400 MG-UNIT TABS, Take by mouth, Disp: , Rfl:     Coenzyme Q10 (CO Q 10) 100 MG CAPS, Take 1 capsule by mouth 2 (two) times a day, Disp: , Rfl:     diltiazem (DILT-XR) 240 MG 24 hr capsule, Take 1 capsule by mouth daily, Disp: , Rfl:     fexofenadine (ALLEGRA) 180 MG tablet, Take 1 tablet by mouth daily, Disp: , Rfl:     hydrochlorothiazide (HYDRODIURIL) 25 mg tablet, Take 1 tablet by mouth daily, Disp: , Rfl:     levothyroxine 75 mcg tablet, Take 75 mcg by mouth, Disp: , Rfl:     lisinopril (ZESTRIL) 5 mg tablet, Take 1 tablet by mouth daily, Disp: , Rfl:     lovastatin (MEVACOR) 20 mg tablet, Take 1 tablet by mouth daily, Disp: , Rfl:     montelukast (SINGULAIR) 10 mg tablet, Take 1 tablet (10 mg total) by mouth daily, Disp: 90 tablet, Rfl: 3    SUPER B COMPLEX/C CAPS, Take 1 capsule by mouth daily, Disp: , Rfl:   Allergies   Allergen Reactions    Pollen Extract        Vitals: Blood pressure 110/70, pulse 78, temperature 98 3 °F (36 8 °C), temperature source Tympanic, height 5' 3" (1 6 m), weight 82 3 kg (181 lb 6 4 oz), SpO2 96 %  Body mass index is 32 13 kg/m²  Oxygen Therapy  SpO2: 96 %    Physical Exam  Physical Exam   Constitutional: She is oriented to person, place, and time  No distress  HENT:   Head: Normocephalic  Mouth/Throat: No oropharyngeal exudate  Eyes: Pupils are equal, round, and reactive to light  No scleral icterus  Neck: Neck supple  No JVD present  Cardiovascular: Normal rate and regular rhythm  Abdominal: Soft  There is no tenderness  Musculoskeletal: She exhibits edema (B/L ankle)  Lymphadenopathy:     She has no cervical adenopathy  Neurological: She is alert and oriented to person, place, and time  Skin: Skin is warm and dry  Psychiatric: She has a normal mood and affect  Imaging and other studies: I have personally reviewed pertinent films in PACS chest CT from April 2017 shows stable 2 mm left lower lobe pulmonary nodule, benign  Emphysema stable  There is scarring in the right middle lobe and lingula, also stable    Pulmonary function testing:  Performed 2010   FEV1/FVC ratio 50%   FEV1 37% predicted  FVC 56% predicted  There is response to bronchodilators   % predicted   % predicted  DLCO corrected for hemoglobin 38 % predicted  This study shows severe obstruction with reduced vital capacity due to air trapping    Spirometry was repeated in the office today  Patient demonstrated good effort cooperation  FEV1/FVC ratio is 46% with an FEV1 of 47% of predicted and FVC of 77% of predicted  This study demonstrates severe obstruction

## 2018-04-26 PROBLEM — R35.1 NOCTURIA: Status: ACTIVE | Noted: 2017-08-14

## 2018-04-26 PROBLEM — R35.0 INCREASED FREQUENCY OF URINATION: Status: ACTIVE | Noted: 2017-08-14

## 2018-05-03 ENCOUNTER — OFFICE VISIT (OUTPATIENT)
Dept: SURGICAL ONCOLOGY | Facility: CLINIC | Age: 73
End: 2018-05-03
Payer: COMMERCIAL

## 2018-05-03 VITALS
HEART RATE: 94 BPM | BODY MASS INDEX: 34.02 KG/M2 | HEIGHT: 63 IN | TEMPERATURE: 98 F | RESPIRATION RATE: 20 BRPM | WEIGHT: 192 LBS | DIASTOLIC BLOOD PRESSURE: 62 MMHG | SYSTOLIC BLOOD PRESSURE: 122 MMHG

## 2018-05-03 DIAGNOSIS — N60.99 ATYPICAL DUCTAL HYPERPLASIA OF BREAST: Primary | ICD-10-CM

## 2018-05-03 DIAGNOSIS — C50.912 LOBULAR CARCINOMA OF LEFT BREAST (HCC): ICD-10-CM

## 2018-05-03 DIAGNOSIS — Z12.31 SCREENING MAMMOGRAM, ENCOUNTER FOR: ICD-10-CM

## 2018-05-03 PROCEDURE — 99213 OFFICE O/P EST LOW 20 MIN: CPT | Performed by: SURGERY

## 2018-05-03 NOTE — PROGRESS NOTES
Surgical Oncology Follow Up       [address] [place] [address],[address] [place] [address]  [facility] [address]  2/83/6237  [id number]  [address] [place] [address],[address] [place] [address]  [facility] [address] [place] [id number]    Chief Complaint   Patient presents with    Left breast ADH/LCIS     6 month follow up          Assessment & Plan:   Assessment/Plan   Patient has a history of atypical ductal hyperplasia as well as LCIS  Annual mammography, annual clinical exam, breast self awareness  Cancer History:        Lobular carcinoma of left breast ([address] )    7/2012 Surgery     Left lumpectomy  Atypical ductal hyperplasia  Lobular carcinoma in situ    TC risk 30 6/4 2            History of Present Illness:   See above    Interval History:   Patient has no complaints  Her mammogram from last October showed no worrisome findings  Review of Systems:   Review of Systems   Constitutional: Negative for activity change, appetite change, fatigue and unexpected weight change  HENT: Negative for ear pain, tinnitus, trouble swallowing and voice change  Eyes: Negative for pain and visual disturbance  Respiratory: Negative for cough, shortness of breath, wheezing and stridor  Cardiovascular: Negative for chest pain, palpitations and leg swelling  Gastrointestinal: Negative for abdominal distention, abdominal pain and blood in stool  Endocrine: Negative for cold intolerance and heat intolerance  Genitourinary: Negative for difficulty urinating, dysuria, flank pain and hematuria  Musculoskeletal: Negative for arthralgias, back pain, gait problem and joint swelling  Skin: Negative for color change, rash and wound  Allergic/Immunologic: Negative for immunocompromised state  Neurological: Negative for dizziness, seizures, speech difficulty, weakness and headaches  Hematological: Negative for adenopathy     Psychiatric/Behavioral: Negative for confusion  Past Medical History     Patient Active Problem List   Diagnosis    Chronic kidney disease, stage 3a    [facility]    Essential hypertension    Hypercholesteremia    Renal cyst, acquired, right    Squamous cell cancer of skin of buttock    Atypical ductal hyperplasia of breast    Environmental allergies    Hypothyroidism    [facility]    Mixed hyperlipidemia    Nocturia    Sciatica    Spinal stenosis of lumbar region without neurogenic claudication    Increased frequency of urination     Past Medical History:   Diagnosis Date    Cancer [facility])     vocal cord cancer, s/p radiation (2008)    COPD [facility]ch[facility]) ([facility] )     Hypertension     Hypothyroidism      Past Surgical History:   Procedure Laterality Date    BREAST LUMPECTOMY Left 2012    CHOLECYSTECTOMY       Family History   Problem Relation Age of Onset    Heart disease Mother     Emphysema Mother     Heart disease Father      Social History     Social History    Marital status: /Civil Union     Spouse name: N/A    Number of children: N/A    Years of education: N/A     Occupational History    Not on file       Social History Main Topics    Smoking status: Former Smoker     Packs/day: 1 00     Years: 50 00     Types: Cigarettes     Quit date: 1/1/2008    Smokeless tobacco: Never Used    Alcohol use Yes      Comment: social    Drug use: No    Sexual activity: Not on file     Other Topics Concern    Not on file     Social History Narrative    No narrative on file       Current Outpatient Prescriptions:     albuterol (PROAIR HFA) 90 mcg/act inhaler, Inhale, Disp: , Rfl:     budesonide-formoterol (SYMBICORT) 160-4 5 mcg/act inhaler, Inhale 2 puffs 2 (two) times a day, Disp: 12 g, Rfl: 11    Calcium Carbonate-Vitamin D3 600-400 MG-UNIT TABS, Take by mouth, Disp: , Rfl:     Coenzyme Q10 (CO Q 10) 100 MG CAPS, Take 1 capsule by mouth 2 (two) times a day, Disp: , Rfl:     diltiazem (DILT-XR) 240 MG 24 hr capsule, Take 1 capsule by mouth daily, Disp: , Rfl:     fexofenadine (ALLEGRA) 180 MG tablet, Take 1 tablet by mouth daily, Disp: , Rfl:     hydrochlorothiazide (HYDRODIURIL) 25 mg tablet, Take 1 tablet by mouth daily, Disp: , Rfl:     levothyroxine 75 mcg tablet, Take 75 mcg by mouth, Disp: , Rfl:     lisinopril (ZESTRIL) 5 mg tablet, Take 1 tablet by mouth daily, Disp: , Rfl:     lovastatin (MEVACOR) 20 mg tablet, Take 1 tablet by mouth daily, Disp: , Rfl:     montelukast (SINGULAIR) 10 mg tablet, Take 1 tablet (10 mg total) by mouth daily, Disp: 90 tablet, Rfl: 3    SUPER B COMPLEX/C CAPS, Take 1 capsule by mouth daily, Disp: , Rfl:   Allergies   Allergen Reactions    Pollen Extract Cough       Physical Exam:     Vitals:    05/03/18 1020   BP: 122/62   Pulse: 94   Resp: 20   Temp: 98 °F (36 7 °C)     Physical Exam   Pulmonary/Chest:     Both breasts were examined in the sitting and supine position  There are no worrisome skin lesions, no nipple retraction and no nipple discharge  There are no dominant masses, axillary adenopathy or supraclavicular adenopathy on either side  Results:   I reviewed her mammograms her breast her nearly completely replaced with fat tissue  There are no worrisome findings  I concur with report  Discussion/Summary:   The patient prefers clinical breast exam once a year as opposed to every 6 months  She does breast self awareness on a regular basis and will contact us if she ever appreciate any changes is a on clinical exam   I will coordinate a 2D screening mammogram for her next November  She is agreeable to seeing Herlinda Meyerkenn at her next visit  We will coordinate that for her  Advance Care Planning/Advance Directives:  I discussed the disease status, treatment plans and follow-up with the patient

## 2018-05-03 NOTE — LETTER
May 3, 2018     Bari Bui [address] [place] [id number]    Patient: Vasquez Pratt   YOB: 1945   Date of Visit: 5/3/2018       Dear Dr Liv Phelps: Thank you for referring Rubio Nicholson to me for evaluation  Below are my notes for this consultation  If you have questions, please do not hesitate to call me  I look forward to following your patient along with you  Sincerely,        Alannah Figueroa MD        CC: No Recipients  Alannah Figueroa MD  5/3/2018 10:51 AM  Sign at close encounter     Surgical Oncology Follow Up       [address] [facility]  [address] [place] [address] [address] [facility]  6/05/6625  [id number]  [address] [facility] SURGICAL ONCOLOGY [address] [address]    Chief Complaint   Patient presents with    Left breast ADH/LCIS     6 month follow up          Assessment & Plan:   Assessment/Plan   Patient has a history of atypical ductal hyperplasia as well as LCIS  Annual mammography, annual clinical exam, breast self awareness  Cancer History:        Lobular carcinoma of left breast ([facility] [facility] )    7/2012 Surgery     Left lumpectomy  Atypical ductal hyperplasia  Lobular carcinoma in situ    TC risk 30 6/4 2            History of Present Illness:   See above    Interval History:   Patient has no complaints  Her mammogram from last October showed no worrisome findings  Review of Systems:   Review of Systems   Constitutional: Negative for activity change, appetite change, fatigue and unexpected weight change  HENT: Negative for ear pain, tinnitus, trouble swallowing and voice change  Eyes: Negative for pain and visual disturbance  Respiratory: Negative for cough, shortness of breath, wheezing and stridor  Cardiovascular: Negative for chest pain, palpitations and leg swelling     Gastrointestinal: Negative for abdominal distention, abdominal pain and blood in stool  Endocrine: Negative for cold intolerance and heat intolerance  Genitourinary: Negative for difficulty urinating, dysuria, flank pain and hematuria  Musculoskeletal: Negative for arthralgias, back pain, gait problem and joint swelling  Skin: Negative for color change, rash and wound  Allergic/Immunologic: Negative for immunocompromised state  Neurological: Negative for dizziness, seizures, speech difficulty, weakness and headaches  Hematological: Negative for adenopathy  Psychiatric/Behavioral: Negative for confusion  Past Medical History     Patient Active Problem List   Diagnosis    Chronic kidney disease, stage 3a    [facility]    Essential hypertension    Hypercholesteremia    Renal cyst, acquired, right    Squamous cell cancer of skin of buttock    Atypical ductal hyperplasia of breast    Environmental allergies    Hypothyroidism    [facility]    Mixed hyperlipidemia    Nocturia    Sciatica    Spinal stenosis of lumbar region without neurogenic claudication    Increased frequency of urination     Past Medical History:   Diagnosis Date    Cancer [facility])     vocal cord cancer, s/p radiation (2008)    COPD [facility]ch[facility] disease) ([facility] )     Hypertension     Hypothyroidism      Past Surgical History:   Procedure Laterality Date    BREAST LUMPECTOMY Left 2012    CHOLECYSTECTOMY       Family History   Problem Relation Age of Onset    Heart disease Mother     Emphysema Mother     Heart disease Father      Social History     Social History    Marital status: /Civil Union     Spouse name: N/A    Number of children: N/A    Years of education: N/A     Occupational History    Not on file       Social History Main Topics    Smoking status: Former Smoker     Packs/day: 1 00     Years: 50 00     Types: Cigarettes     Quit date: 1/1/2008    Smokeless tobacco: Never Used  Alcohol use Yes      Comment: social    Drug use: No    Sexual activity: Not on file     Other Topics Concern    Not on file     Social History Narrative    No narrative on file       Current Outpatient Prescriptions:     albuterol (PROAIR HFA) 90 mcg/act inhaler, Inhale, Disp: , Rfl:     budesonide-formoterol (SYMBICORT) 160-4 5 mcg/act inhaler, Inhale 2 puffs 2 (two) times a day, Disp: 12 g, Rfl: 11    Calcium Carbonate-Vitamin D3 600-400 MG-UNIT TABS, Take by mouth, Disp: , Rfl:     Coenzyme Q10 (CO Q 10) 100 MG CAPS, Take 1 capsule by mouth 2 (two) times a day, Disp: , Rfl:     diltiazem (DILT-XR) 240 MG 24 hr capsule, Take 1 capsule by mouth daily, Disp: , Rfl:     fexofenadine (ALLEGRA) 180 MG tablet, Take 1 tablet by mouth daily, Disp: , Rfl:     hydrochlorothiazide (HYDRODIURIL) 25 mg tablet, Take 1 tablet by mouth daily, Disp: , Rfl:     levothyroxine 75 mcg tablet, Take 75 mcg by mouth, Disp: , Rfl:     lisinopril (ZESTRIL) 5 mg tablet, Take 1 tablet by mouth daily, Disp: , Rfl:     lovastatin (MEVACOR) 20 mg tablet, Take 1 tablet by mouth daily, Disp: , Rfl:     montelukast (SINGULAIR) 10 mg tablet, Take 1 tablet (10 mg total) by mouth daily, Disp: 90 tablet, Rfl: 3    SUPER B COMPLEX/C CAPS, Take 1 capsule by mouth daily, Disp: , Rfl:   Allergies   Allergen Reactions    Pollen Extract Cough       Physical Exam:     Vitals:    05/03/18 1020   BP: 122/62   Pulse: 94   Resp: 20   Temp: 98 °F (36 7 °C)     Physical Exam   Pulmonary/Chest:     Both breasts were examined in the sitting and supine position  There are no worrisome skin lesions, no nipple retraction and no nipple discharge  There are no dominant masses, axillary adenopathy or supraclavicular adenopathy on either side  Results:   I reviewed her mammograms her breast her nearly completely replaced with fat tissue  There are no worrisome findings  I concur with report      Discussion/Summary:   The patient prefers clinical breast exam once a year as opposed to every 6 months  She does breast self awareness on a regular basis and will contact us if she ever appreciate any changes is a on clinical exam   I will coordinate a 2D screening mammogram for her next November  She is agreeable to seeing Neri Hansen at her next visit  We will coordinate that for her  Advance Care Planning/Advance Directives:  I discussed the disease status, treatment plans and follow-up with the patient

## 2018-05-18 DIAGNOSIS — J44.9 CHRONIC OBSTRUCTIVE PULMONARY DISEASE, UNSPECIFIED COPD TYPE (HCC): Primary | ICD-10-CM

## 2018-05-18 RX ORDER — ALBUTEROL SULFATE 90 UG/1
2 AEROSOL, METERED RESPIRATORY (INHALATION) EVERY 4 HOURS PRN
Qty: 1 INHALER | Refills: 5 | Status: SHIPPED | OUTPATIENT
Start: 2018-05-18 | End: 2019-03-05 | Stop reason: SDUPTHER

## 2018-06-04 DIAGNOSIS — E87.1 HYPONATREMIA: Primary | ICD-10-CM

## 2018-09-04 ENCOUNTER — OFFICE VISIT (OUTPATIENT)
Dept: NEPHROLOGY | Facility: CLINIC | Age: 73
End: 2018-09-04
Payer: COMMERCIAL

## 2018-09-04 VITALS
HEIGHT: 63 IN | HEART RATE: 66 BPM | BODY MASS INDEX: 34.38 KG/M2 | WEIGHT: 194 LBS | SYSTOLIC BLOOD PRESSURE: 120 MMHG | DIASTOLIC BLOOD PRESSURE: 72 MMHG

## 2018-09-04 DIAGNOSIS — N18.31 CHRONIC KIDNEY DISEASE, STAGE 3A (HCC): Primary | ICD-10-CM

## 2018-09-04 DIAGNOSIS — N28.1 RENAL CYST, ACQUIRED, RIGHT: ICD-10-CM

## 2018-09-04 DIAGNOSIS — I10 ESSENTIAL HYPERTENSION: ICD-10-CM

## 2018-09-04 PROCEDURE — 4040F PNEUMOC VAC/ADMIN/RCVD: CPT | Performed by: INTERNAL MEDICINE

## 2018-09-04 PROCEDURE — 99213 OFFICE O/P EST LOW 20 MIN: CPT | Performed by: INTERNAL MEDICINE

## 2018-09-04 NOTE — PATIENT INSTRUCTIONS
1  chronic kidney disease, stage 3a per CKD EPI equation (eGFR 51ml/min) likely d/t aging kidney +/- hypertension nephrosclerosis   -June 2018 Cr 1 31 and stable  sCr ranges 1 1-1 3, repeat BMP now  -formal UA shows pH 8,  leukocytes, rare RBC, 3-5 WBC  -renal ultrasound shows normal kidneys with right renal cyst  -CKD may be due to HTN nephrosclerosis +/- physiologic CKD of aging  -urine protein:Cr 0 18, FLC 1 55 - acceptable for CKD, SPEP/UPEP negative for monoclonal gammopathy  -as no significant proteinuria or hematuria on UA, will defer glomerular work up  -will repeat urine protein:Cr and repeat urinalysis with microscopy    2  renal cyst, acquired - monitor with repeat ultrasound in 12 months (February 2019)  1 8 cm cyst from renal u/s 2/26/18     3  HTN - BP well controlled  -continue diltiazem XR 240mg daily, hydrochlorothiazide 25mg daily, lisinopril 5mg daily  -Should monitor BP at home  If BP > 140/90 consistently call the nephrology office      4  CKD BMD - Mag/phos/PTH/25(OH) vitamin D levels acceptable  Continue calcium vitamin D3 combination pill daily  Will repeat mag/phos/PTh levels     5  Pedal edema - improved now  Avoid high salt diet  On hydrochlorothiazide which is a water pill

## 2018-09-04 NOTE — PROGRESS NOTES
NEPHROLOGY OUTPATIENT PROGRESS NOTE   Madalyn Anderson 68 y o  female MRN: [id number]  DATE: 9/4/2018  Reason for visit:   Chief Complaint   Patient presents with    Follow-up    Chronic Kidney Disease        Patient Instructions   1  chronic kidney disease, stage 3a per CKD EPI equation (eGFR 51ml/min) likely d/t aging kidney +/- hypertension nephrosclerosis   -June 2018 Cr 1 31 and stable  sCr ranges 1 1-1 3, repeat BMP now  -formal UA shows pH 8,  leukocytes, rare RBC, 3-5 WBC  -renal ultrasound shows normal kidneys with right renal cyst  -CKD may be due to HTN nephrosclerosis +/- physiologic CKD of aging  -urine protein:Cr 0 18, FLC 1 55 - acceptable for CKD, SPEP/UPEP negative for monoclonal gammopathy  -as no significant proteinuria or hematuria on UA, will defer glomerular work up  -will repeat urine protein:Cr and repeat urinalysis with microscopy    2  renal cyst, acquired - monitor with repeat ultrasound in 12 months (February 2019)  1 8 cm cyst from renal u/s 2/26/18     3  HTN - BP well controlled  -continue diltiazem XR 240mg daily, hydrochlorothiazide 25mg daily, lisinopril 5mg daily  -Should monitor BP at home  If BP > 140/90 consistently call the nephrology office      4  CKD BMD - Mag/phos/PTH/25(OH) vitamin D levels acceptable  Continue calcium vitamin D3 combination pill daily  Will repeat mag/phos/PTh levels     5  Pedal edema - improved now  Avoid high salt diet  On hydrochlorothiazide which is a water pill  Enma Stevens was seen today for follow-up and chronic kidney disease  Diagnoses and all orders for this visit:    Chronic kidney disease, stage 3a  -     Basic metabolic panel; Future  -     Phosphorus; Future  -     Magnesium; Future  -     PTH, intact; Future  -     Protein / creatinine ratio, urine; Future  -     Urinalysis with microscopic;  Future    Essential hypertension    Renal cyst, acquired, right        Assessment/Plan:  1  chronic kidney disease, stage 3a likely d/t aging kidney +/- hypertension nephrosclerosis   -June 2018 Cr 1 31 and stable  sCr ranges 1 1-1 3, repeat BMP now  -formal UA shows pH 8,  leukocytes, rare RBC, 3-5 WBC  -renal ultrasound shows normal kidneys with right renal cyst  -CKD may be due to HTN nephrosclerosis +/- physiologic CKD of aging  -urine protein:Cr 0 18, FLC 1 55 - acceptable for CKD, SPEP/UPEP negative for monoclonal gammopathy  -as no significant proteinuria or hematuria on UA, will defer glomerular work up  -will repeat urine protein:Cr and repeat urinalysis with microscopy    2  renal cyst, acquired - monitor with repeat ultrasound in 12 months (February 2019)  1 8 cm cyst from renal u/s 2/26/18     3  HTN - BP well controlled  -continue diltiazem XR 240mg daily, hydrochlorothiazide 25mg daily, lisinopril 5mg daily  -Should monitor BP at home  If BP > 140/90 consistently call the nephrology office      4  CKD BMD - Mag/phos/PTH/25(OH) vitamin D levels acceptable  Continue calcium vitamin D3 combination pill daily  Will repeat mag/phos/PTh levels     5  Pedal edema - improved now  Avoid high salt diet  On hydrochlorothiazide which is a water pill  SUBJECTIVE / INTERVAL HISTORY:  68 y o  female presents in follow up of CKD  Arlyn Schaumann denies any recent illness/hospitalizations since last office visit  She went to [place] and [place]  She drove there  She visited her son for his 50th birthday  Her legs were swelling the entire trip  She was scared  Her legs are normal now  Her legs would always start to swell around lunch  She had thyroid medication adjusted  She had her imaging done and veins and aortic aneurysm are all fine  Denies NSAID use  BP - no issues  Has been well controlled  Uses [employer] for her seasonal allergies daily  Review of Systems   Constitutional: Negative for chills and fever  HENT: Negative for sore throat and trouble swallowing  Eyes: Negative for visual disturbance     Respiratory: Negative for cough and shortness of breath  Cardiovascular: Positive for leg swelling (trace in the ankles but improves with elevation)  Negative for chest pain  Gastrointestinal: Negative for diarrhea, nausea and vomiting  Endocrine: Negative for polyuria  Genitourinary: Negative for difficulty urinating, dysuria and hematuria  Musculoskeletal: Negative for back pain and neck pain  Skin: Negative for rash  Neurological: Negative for dizziness, light-headedness and numbness  Hematological: Negative for adenopathy  Does not bruise/bleed easily  Psychiatric/Behavioral: The patient is not nervous/anxious  OBJECTIVE:  /72 (BP Location: Right arm, Patient Position: Sitting, Cuff Size: Standard)   Pulse 66   Ht 5' 3" (1 6 m)   Wt 88 kg (194 lb)   BMI 34 37 kg/m²  Body mass index is 34 37 kg/m²  Physical exam:  Physical Exam   Constitutional: She appears well-developed and well-nourished  No distress  HENT:   Head: Normocephalic and atraumatic  Mouth/Throat: No oropharyngeal exudate  Eyes: Right eye exhibits no discharge  Left eye exhibits no discharge  No scleral icterus  Neck: Normal range of motion  Neck supple  No thyromegaly present  Cardiovascular: Normal rate and regular rhythm  No murmur heard  Pulmonary/Chest: Effort normal and breath sounds normal  No respiratory distress  She has no wheezes  Abdominal: Soft  Bowel sounds are normal  She exhibits no distension  Musculoskeletal: She exhibits edema (trace ankle edema with varicose veins)  Neurological: She is alert  She exhibits normal muscle tone  awake   Skin: Skin is warm and dry  No rash noted  She is not diaphoretic  Psychiatric: She has a normal mood and affect  Her behavior is normal    Nursing note and vitals reviewed        Medications:    Current Outpatient Prescriptions:     albuterol (PROAIR HFA) 90 mcg/act inhaler, Inhale 2 puffs every 4 (four) hours as needed for wheezing, Disp: 1 Inhaler, Rfl: 5    budesonide-formoterol (SYMBICORT) 160-4 5 mcg/act inhaler, Inhale 2 puffs 2 (two) times a day, Disp: 12 g, Rfl: 11    Calcium Carbonate-Vitamin D3 600-400 MG-UNIT TABS, Take by mouth, Disp: , Rfl:     Coenzyme Q10 (CO Q 10) 100 MG CAPS, Take 1 capsule by mouth daily  , Disp: , Rfl:     diltiazem (DILT-XR) 240 MG 24 hr capsule, Take 1 capsule by mouth daily, Disp: , Rfl:     fexofenadine (ALLEGRA) 180 MG tablet, Take 1 tablet by mouth daily, Disp: , Rfl:     hydrochlorothiazide (HYDRODIURIL) 25 mg tablet, Take 1 tablet by mouth daily, Disp: , Rfl:     levothyroxine 75 mcg tablet, Take 100 mcg by mouth  , Disp: , Rfl:     lisinopril (ZESTRIL) 5 mg tablet, Take 1 tablet by mouth daily, Disp: , Rfl:     lovastatin (MEVACOR) 20 mg tablet, Take 1 tablet by mouth daily, Disp: , Rfl:     montelukast (SINGULAIR) 10 mg tablet, Take 1 tablet (10 mg total) by mouth daily, Disp: 90 tablet, Rfl: 3    SUPER B COMPLEX/C CAPS, Take 1 capsule by mouth daily, Disp: , Rfl:     Allergies: Allergies as of 09/04/2018 - Reviewed 09/04/2018   Allergen Reaction Noted    Pollen extract Cough 03/13/2015       The following portions of the patient's history were reviewed and updated as appropriate: past family history, past surgical history and problem list     Laboratory Results:  No results found for: GLUCOSE, CALCIUM, NA, K, CO2, CL, BUN, CREATININE     No results found for: PTH, CALCIUM, CAION, PHOS    Portions of the record may have been created with voice recognition software   Occasional wrong word or "sound a like" substitutions may have occurred due to the inherent limitations of voice recognition software   Read the chart carefully and recognize, using context, where substitutions have occurred

## 2018-09-04 NOTE — LETTER
September 4, 2018     [address] [place] [address]    Patient: Zander Carson   YOB: 1945   Date of Visit: 9/4/2018       Dear Dr Jose Isabel: Thank you for referring Tess Macedo to me for evaluation  Below are my notes for this consultation  If you have questions, please do not hesitate to call me  I look forward to following your patient along with you  Sincerely,        Tim Albert DO        CC: No Recipients  Tim Albert DO  9/4/2018 11:09 AM  Sign at close encounter  Arnol 68 y o  female MRN: [id number]  DATE: 9/4/2018  Reason for visit:   Chief Complaint   Patient presents with    Follow-up    Chronic Kidney Disease        Patient Instructions   1  chronic kidney disease, stage 3a per CKD EPI equation (eGFR 51ml/min) likely d/t aging kidney +/- hypertension nephrosclerosis   -June 2018 Cr 1 31 and stable  sCr ranges 1 1-1 3, repeat BMP now  -formal UA shows pH 8,  leukocytes, rare RBC, 3-5 WBC  -renal ultrasound shows normal kidneys with right renal cyst  -CKD may be due to HTN nephrosclerosis +/- physiologic CKD of aging  -urine protein:Cr 0 18, FLC 1 55 - acceptable for CKD, SPEP/UPEP negative for monoclonal gammopathy  -as no significant proteinuria or hematuria on UA, will defer glomerular work up  -will repeat urine protein:Cr and repeat urinalysis with microscopy    2  renal cyst, acquired - monitor with repeat ultrasound in 12 months (February 2019)  1 8 cm cyst from renal u/s 2/26/18     3  HTN - BP well controlled  -continue diltiazem XR 240mg daily, hydrochlorothiazide 25mg daily, lisinopril 5mg daily  -Should monitor BP at home  If BP > 140/90 consistently call the nephrology office      4  CKD BMD - Mag/phos/PTH/25(OH) vitamin D levels acceptable  Continue calcium vitamin D3 combination pill daily  Will repeat mag/phos/PTh levels     5  Pedal edema - improved now   Avoid high salt diet  On hydrochlorothiazide which is a water pill  Janell Ohara was seen today for follow-up and chronic kidney disease  Diagnoses and all orders for this visit:    Chronic kidney disease, stage 3a  -     Basic metabolic panel; Future  -     Phosphorus; Future  -     Magnesium; Future  -     PTH, intact; Future  -     Protein / creatinine ratio, urine; Future  -     Urinalysis with microscopic; Future    Essential hypertension    Renal cyst, acquired, right        Assessment/Plan:  1  chronic kidney disease, stage 3a likely d/t aging kidney +/- hypertension nephrosclerosis   - June 2018 Cr 1 31 and stable  sCr ranges 1 1-1 3, repeat BMP now  -formal UA shows pH 8,  leukocytes, rare RBC, 3-5 WBC  -renal ultrasound shows normal kidneys with right renal cyst  -CKD may be due to HTN nephrosclerosis +/- physiologic CKD of aging  -urine protein:Cr 0 18, FLC 1 55 - acceptable for CKD, SPEP/UPEP negative for monoclonal gammopathy  -as no significant proteinuria or hematuria on UA, will defer glomerular work up  -will repeat urine protein:Cr and repeat urinalysis with microscopy    2  renal cyst, acquired - monitor with repeat ultrasound in 12 months (February 2019)  1 8 cm cyst from renal u/s 2/26/18     3  HTN - BP well controlled  -continue diltiazem XR 240mg daily, hydrochlorothiazide 25mg daily, lisinopril 5mg daily  -Should monitor BP at home  If BP > 140/90 consistently call the nephrology office      4  CKD BMD - Mag/phos/PTH/25(OH) vitamin D levels acceptable  Continue calcium vitamin D3 combination pill daily  Will repeat mag/phos/PTh levels     5  Pedal edema - improved now  Avoid high salt diet  On hydrochlorothiazide which is a water pill  SUBJECTIVE / INTERVAL HISTORY:  68 y o  female presents in follow up of CKD  Rosamaria Rodriguez denies any recent illness/hospitalizations since last office visit  She went to [place] and [place]  She drove there   She visited her son for his 53th birthday  Her legs were swelling the entire trip  She was scared  Her legs are normal now  Her legs would always start to swell around lunch  She had thyroid medication adjusted  She had her imaging done and veins and aortic aneurysm are all fine  Denies NSAID use  BP - no issues  Has been well controlled  Uses allergra for her seasonal allergies daily  Review of Systems   Constitutional: Negative for chills and fever  HENT: Negative for sore throat and trouble swallowing  Eyes: Negative for visual disturbance  Respiratory: Negative for cough and shortness of breath  Cardiovascular: Positive for leg swelling (trace in the ankles but improves with elevation)  Negative for chest pain  Gastrointestinal: Negative for diarrhea, nausea and vomiting  Endocrine: Negative for polyuria  Genitourinary: Negative for difficulty urinating, dysuria and hematuria  Musculoskeletal: Negative for back pain and neck pain  Skin: Negative for rash  Neurological: Negative for dizziness, light-headedness and numbness  Hematological: Negative for adenopathy  Does not bruise/bleed easily  Psychiatric/Behavioral: The patient is not nervous/anxious  OBJECTIVE:  /72 (BP Location: Right arm, Patient Position: Sitting, Cuff Size: Standard)   Pulse 66   Ht 5' 3" (1 6 m)   Wt 88 kg (194 lb)   BMI 34 37 kg/m²   Body mass index is 34 37 kg/m²  Physical exam:  Physical Exam   Constitutional: She appears well-developed and well-nourished  No distress  HENT:   Head: Normocephalic and atraumatic  Mouth/Throat: No oropharyngeal exudate  Eyes: Right eye exhibits no discharge  Left eye exhibits no discharge  No scleral icterus  Neck: Normal range of motion  Neck supple  No thyromegaly present  Cardiovascular: Normal rate and regular rhythm  No murmur heard  Pulmonary/Chest: Effort normal and breath sounds normal  No respiratory distress  She has no wheezes  Abdominal: Soft   Bowel sounds are normal  She exhibits no distension  Musculoskeletal: She exhibits edema (trace ankle edema with varicose veins)  Neurological: She is alert  She exhibits normal muscle tone  awake   Skin: Skin is warm and dry  No rash noted  She is not diaphoretic  Psychiatric: She has a normal mood and affect  Her behavior is normal    Nursing note and vitals reviewed  Medications:    Current Outpatient Prescriptions:     albuterol (PROAIR HFA) 90 mcg/act inhaler, Inhale 2 puffs every 4 (four) hours as needed for wheezing, Disp: 1 Inhaler, Rfl: 5    budesonide-formoterol (SYMBICORT) 160-4 5 mcg/act inhaler, Inhale 2 puffs 2 (two) times a day, Disp: 12 g, Rfl: 11    Calcium Carbonate-Vitamin D3 600-400 MG-UNIT TABS, Take by mouth, Disp: , Rfl:     Coenzyme Q10 (CO Q 10) 100 MG CAPS, Take 1 capsule by mouth daily  , Disp: , Rfl:     diltiazem (DILT-XR) 240 MG 24 hr capsule, Take 1 capsule by mouth daily, Disp: , Rfl:     fexofenadine (ALLEGRA) 180 MG tablet, Take 1 tablet by mouth daily, Disp: , Rfl:     hydrochlorothiazide (HYDRODIURIL) 25 mg tablet, Take 1 tablet by mouth daily, Disp: , Rfl:     levothyroxine 75 mcg tablet, Take 100 mcg by mouth  , Disp: , Rfl:     lisinopril (ZESTRIL) 5 mg tablet, Take 1 tablet by mouth daily, Disp: , Rfl:     lovastatin (MEVACOR) 20 mg tablet, Take 1 tablet by mouth daily, Disp: , Rfl:     montelukast (SINGULAIR) 10 mg tablet, Take 1 tablet (10 mg total) by mouth daily, Disp: 90 tablet, Rfl: 3    SUPER B COMPLEX/C CAPS, Take 1 capsule by mouth daily, Disp: , Rfl:     Allergies:   Allergies as of 09/04/2018 - Reviewed 09/04/2018   Allergen Reaction Noted    Pollen extract Cough 03/13/2015       The following portions of the patient's history were reviewed and updated as appropriate: past family history, past surgical history and problem list     Laboratory Results:  No results found for: GLUCOSE, CALCIUM, NA, K, CO2, CL, BUN, CREATININE     No results found for: PTH, CALCIUM, CAION, PHOS    Portions of the record may have been created with voice recognition software   Occasional wrong word or "sound a like" substitutions may have occurred due to the inherent limitations of voice recognition software   Read the chart carefully and recognize, using context, where substitutions have occurred

## 2018-11-05 ENCOUNTER — HOSPITAL ENCOUNTER (OUTPATIENT)
Dept: RADIOLOGY | Age: 73
Discharge: HOME | End: 2018-11-05
Payer: COMMERCIAL

## 2018-11-05 DIAGNOSIS — Z12.31 SCREENING MAMMOGRAM, ENCOUNTER FOR: ICD-10-CM

## 2018-11-05 PROCEDURE — 77067 SCR MAMMO BI INCL CAD: CPT

## 2019-02-04 ENCOUNTER — HOSPITAL ENCOUNTER (OUTPATIENT)
Dept: RADIOLOGY | Age: 74
Discharge: HOME | End: 2019-02-04
Payer: COMMERCIAL

## 2019-02-04 DIAGNOSIS — N28.1 RENAL CYST, ACQUIRED, RIGHT: ICD-10-CM

## 2019-02-04 PROCEDURE — 76770 US EXAM ABDO BACK WALL COMP: CPT

## 2019-03-05 ENCOUNTER — OFFICE VISIT (OUTPATIENT)
Dept: PULMONOLOGY | Facility: CLINIC | Age: 74
End: 2019-03-05
Payer: COMMERCIAL

## 2019-03-05 VITALS
DIASTOLIC BLOOD PRESSURE: 66 MMHG | SYSTOLIC BLOOD PRESSURE: 110 MMHG | HEART RATE: 84 BPM | BODY MASS INDEX: 34.48 KG/M2 | TEMPERATURE: 98 F | WEIGHT: 194.6 LBS | OXYGEN SATURATION: 97 % | HEIGHT: 63 IN

## 2019-03-05 DIAGNOSIS — J44.9 COPD, SEVERE (HCC): Primary | ICD-10-CM

## 2019-03-05 DIAGNOSIS — J44.9 CHRONIC OBSTRUCTIVE PULMONARY DISEASE, UNSPECIFIED COPD TYPE (HCC): ICD-10-CM

## 2019-03-05 DIAGNOSIS — J45.40 MODERATE PERSISTENT ASTHMA WITHOUT COMPLICATION: ICD-10-CM

## 2019-03-05 DIAGNOSIS — J45.909 UNCOMPLICATED ASTHMA, UNSPECIFIED ASTHMA SEVERITY, UNSPECIFIED WHETHER PERSISTENT: ICD-10-CM

## 2019-03-05 PROCEDURE — 99214 OFFICE O/P EST MOD 30 MIN: CPT | Performed by: INTERNAL MEDICINE

## 2019-03-05 RX ORDER — ALBUTEROL SULFATE 90 UG/1
2 AEROSOL, METERED RESPIRATORY (INHALATION) EVERY 4 HOURS PRN
Qty: 1 INHALER | Refills: 5 | Status: SHIPPED | OUTPATIENT
Start: 2019-03-05 | End: 2019-03-27

## 2019-03-05 RX ORDER — MONTELUKAST SODIUM 10 MG/1
10 TABLET ORAL DAILY
Qty: 30 TABLET | Refills: 11 | Status: SHIPPED | OUTPATIENT
Start: 2019-03-05 | End: 2019-03-23 | Stop reason: SDUPTHER

## 2019-03-05 NOTE — PROGRESS NOTES
Pulmonary Follow Up Note   Fanny Churchill 68 y o  female MRN: [id number]  3/5/2019      Assessment/Plan:     COPD, severe [facility])   She has persistent dyspnea despite using Symbicort twice daily  I have given her a sample of Trelegy  Ellipta to use in place of the Symbicort  Hopefully this will improve her exercise tolerance and reduce symptoms  I also encouraged her to go back to ENT for evaluation of persistent vocal hoarseness  She does have a history of throat cancer and her last exam with them was within  The past few months  Moderate persistent asthma without complication   She will continue with montelukast and Allegra  Visit orders:    Diagnoses and all orders for this visit:    COPD, severe ([address] )  -     fluticasone-umeclidinium-vilanterol (TRELEGY ELLIPTA) 100-62 5-25 MCG/INH inhaler; Inhale 1 puff daily Rinse mouth after use  -     CT lung screening program; Future    Chronic obstructive pulmonary disease, unspecified COPD type ([facility])  -     albuterol (PROAIR HFA) 90 mcg/act inhaler; Inhale 2 puffs every 4 (four) hours as needed for wheezing    Uncomplicated asthma, unspecified asthma severity, unspecified whether persistent  -     montelukast (SINGULAIR) 10 mg tablet; Take 1 tablet (10 mg total) by mouth daily    Moderate persistent asthma without complication        Return in about 1 year (around 3/5/2020)  History of Present Illness   HPI:  Fanny Churchill is a 68 y o  female who is here today for follow-up regarding asthma / COPD  In December/ January, she developed an upper respiratory infection, requiring course of antibiotics  Most of her symptoms have resolved with the exception of persistent vocal hoarseness, frequent throat clearing and constant nasal drip  She has a for Flonase nasal spray which she uses on occasion, but it often causes nose bleeds  She feels that her dyspnea has become more prominent despite taking Symbicort twice daily    She is also using her albuterol rescue inhaler on occasion  She has no significant wheezing  She denies chest tightness  Review of Systems    She denies headaches or vision changes  She denies chest pain  She denies nausea, vomiting or diarrhea  She denies dysuria or hematuria  She denies heartburn  She denies lower extremity edema, skin rashes  All other review of systems are negative      Historical Information   Past Medical History:   Diagnosis Date    Cancer [facility])     vocal cord cancer, s/p radiation ()    Chronic kidney disease     COPD (chronic obstructive pulmonary disease) [facility] [address]     Hypertension     Hypothyroidism     Renal cyst      Past Surgical History:   Procedure Laterality Date    BREAST LUMPECTOMY Left 2012    CHOLECYSTECTOMY       Family History   Problem Relation Age of Onset    Heart disease Mother     Emphysema Mother     Heart disease Father        Social History     Tobacco Use   Smoking Status Former Smoker    Packs/day:     Years: 50 00    Pack years: 50 00    Types: Cigarettes    Last attempt to quit: 2008    Years since quittin 1   Smokeless Tobacco Never Used         Meds/Allergies     Current Outpatient Medications:     albuterol (PROAIR HFA) 90 mcg/act inhaler, Inhale 2 puffs every 4 (four) hours as needed for wheezing, Disp: 1 Inhaler, Rfl: 5    budesonide-formoterol (SYMBICORT) 160-4 5 mcg/act inhaler, Inhale 2 puffs 2 (two) times a day, Disp: 12 g, Rfl: 11    Calcium Carbonate-Vitamin D3 600-400 MG-UNIT TABS, Take by mouth, Disp: , Rfl:     Coenzyme Q10 (CO Q 10) 100 MG CAPS, Take 1 capsule by mouth daily  , Disp: , Rfl:     diltiazem (DILT-XR) 240 MG 24 hr capsule, Take 1 capsule by mouth daily, Disp: , Rfl:     fexofenadine (ALLEGRA) 180 MG tablet, Take 1 tablet by mouth daily, Disp: , Rfl:     hydrochlorothiazide (HYDRODIURIL) 25 mg tablet, Take 1 tablet by mouth daily, Disp: , Rfl:     levothyroxine 112 mcg tablet, Take 112 mcg by mouth , Disp: , Rfl:    lisinopril (ZESTRIL) 5 mg tablet, Take 1 tablet by mouth daily, Disp: , Rfl:     lovastatin (MEVACOR) 20 mg tablet, Take 1 tablet by mouth daily, Disp: , Rfl:     montelukast (SINGULAIR) 10 mg tablet, Take 1 tablet (10 mg total) by mouth daily, Disp: 30 tablet, Rfl: 11    SUPER B COMPLEX/C CAPS, Take 1 capsule by mouth daily, Disp: , Rfl:     fluticasone-umeclidinium-vilanterol (TRELEGY ELLIPTA) 100-62 5-25 MCG/INH inhaler, Inhale 1 puff daily Rinse mouth after use , Disp: 1 Inhaler, Rfl: 5  Allergies   Allergen Reactions    Bee Pollen Cough    Pollen Extract Cough       Vitals: Blood pressure 110/66, pulse 84, temperature 98 °F (36 7 °C), temperature source Tympanic, height 5' 3" (1 6 m), weight 88 3 kg (194 lb 9 6 oz), SpO2 97 %  Body mass index is 34 47 kg/m²  Oxygen Therapy  SpO2: 97 %  Oxygen Therapy: None (Room air)    Physical Exam   Physical Exam   Constitutional: She is oriented to person, place, and time  No distress  HENT:   Head: Normocephalic  Mouth/Throat: No oropharyngeal exudate  Eyes: Pupils are equal, round, and reactive to light  No scleral icterus  Neck: Neck supple  No JVD present  Cardiovascular: Normal rate and regular rhythm  No murmur heard  Pulmonary/Chest: She has wheezes (  Scant wheeze on the right)  She has no rales  Abdominal: Soft  There is no tenderness  Musculoskeletal: She exhibits no edema  Lymphadenopathy:     She has no cervical adenopathy  Neurological: She is alert and oriented to person, place, and time  Skin: Skin is warm and dry  Psychiatric: She has a normal mood and affect  Pulmonary function testing:  Performed  3/22/18   FEV1/FVC ratio 46%   FEV1 47% predicted  FVC 77% predicted     spirometry shows severe obstruction

## 2019-03-05 NOTE — ASSESSMENT & PLAN NOTE
She has persistent dyspnea despite using Symbicort twice daily  I have given her a sample of Trelegy  Ellipta to use in place of the Symbicort  Hopefully this will improve her exercise tolerance and reduce symptoms  I also encouraged her to go back to ENT for evaluation of persistent vocal hoarseness  She does have a history of throat cancer and her last exam with them was within  The past few months

## 2019-03-14 PROBLEM — J38.3 LESION OF VOCAL CORD: Status: ACTIVE | Noted: 2019-03-14

## 2019-03-18 ENCOUNTER — APPOINTMENT (OUTPATIENT)
Dept: LAB | Age: 74
End: 2019-03-18
Payer: COMMERCIAL

## 2019-03-18 ENCOUNTER — TELEPHONE (OUTPATIENT)
Dept: NEPHROLOGY | Facility: HOSPITAL | Age: 74
End: 2019-03-18

## 2019-03-18 ENCOUNTER — TRANSCRIBE ORDERS (OUTPATIENT)
Dept: ADMINISTRATIVE | Age: 74
End: 2019-03-18

## 2019-03-18 ENCOUNTER — OFFICE VISIT (OUTPATIENT)
Dept: LAB | Age: 74
End: 2019-03-18
Payer: COMMERCIAL

## 2019-03-18 DIAGNOSIS — J38.3 LESION OF VOCAL CORD: ICD-10-CM

## 2019-03-18 DIAGNOSIS — N18.31 CHRONIC KIDNEY DISEASE, STAGE 3A (HCC): Primary | ICD-10-CM

## 2019-03-18 DIAGNOSIS — I10 ESSENTIAL HYPERTENSION: ICD-10-CM

## 2019-03-18 DIAGNOSIS — J38.3 LESION OF VOCAL CORD: Primary | ICD-10-CM

## 2019-03-18 LAB
BASOPHILS # BLD AUTO: 0.04 THOUSANDS/ΜL (ref 0–0.1)
BASOPHILS NFR BLD AUTO: 1 % (ref 0–1)
CREAT UR-MCNC: 98.7 MG/DL
EOSINOPHIL # BLD AUTO: 0.12 THOUSAND/ΜL (ref 0–0.61)
EOSINOPHIL NFR BLD AUTO: 2 % (ref 0–6)
ERYTHROCYTE [DISTWIDTH] IN BLOOD BY AUTOMATED COUNT: 12.5 % (ref 11.6–15.1)
HCT VFR BLD AUTO: 42.8 % (ref 34.8–46.1)
HGB BLD-MCNC: 14.3 G/DL (ref 11.5–15.4)
IMM GRANULOCYTES # BLD AUTO: 0.02 THOUSAND/UL (ref 0–0.2)
IMM GRANULOCYTES NFR BLD AUTO: 0 % (ref 0–2)
LYMPHOCYTES # BLD AUTO: 0.98 THOUSANDS/ΜL (ref 0.6–4.47)
LYMPHOCYTES NFR BLD AUTO: 14 % (ref 14–44)
MCH RBC QN AUTO: 32.5 PG (ref 26.8–34.3)
MCHC RBC AUTO-ENTMCNC: 33.4 G/DL (ref 31.4–37.4)
MCV RBC AUTO: 97 FL (ref 82–98)
MICROALBUMIN UR-MCNC: 7.3 MG/L (ref 0–20)
MICROALBUMIN/CREAT 24H UR: 7 MG/G CREATININE (ref 0–30)
MONOCYTES # BLD AUTO: 0.79 THOUSAND/ΜL (ref 0.17–1.22)
MONOCYTES NFR BLD AUTO: 11 % (ref 4–12)
NEUTROPHILS # BLD AUTO: 5.19 THOUSANDS/ΜL (ref 1.85–7.62)
NEUTS SEG NFR BLD AUTO: 72 % (ref 43–75)
NRBC BLD AUTO-RTO: 0 /100 WBCS
PLATELET # BLD AUTO: 223 THOUSANDS/UL (ref 149–390)
PMV BLD AUTO: 10.1 FL (ref 8.9–12.7)
POTASSIUM SERPL-SCNC: 4.4 MMOL/L (ref 3.5–5.3)
RBC # BLD AUTO: 4.4 MILLION/UL (ref 3.81–5.12)
WBC # BLD AUTO: 7.14 THOUSAND/UL (ref 4.31–10.16)

## 2019-03-18 PROCEDURE — 85025 COMPLETE CBC W/AUTO DIFF WBC: CPT

## 2019-03-18 PROCEDURE — 84132 ASSAY OF SERUM POTASSIUM: CPT

## 2019-03-18 PROCEDURE — 36415 COLL VENOUS BLD VENIPUNCTURE: CPT

## 2019-03-18 PROCEDURE — 82570 ASSAY OF URINE CREATININE: CPT | Performed by: OTOLARYNGOLOGY

## 2019-03-18 PROCEDURE — 93005 ELECTROCARDIOGRAM TRACING: CPT

## 2019-03-18 PROCEDURE — 82043 UR ALBUMIN QUANTITATIVE: CPT | Performed by: OTOLARYNGOLOGY

## 2019-03-19 ENCOUNTER — OFFICE VISIT (OUTPATIENT)
Dept: NEPHROLOGY | Facility: CLINIC | Age: 74
End: 2019-03-19
Payer: COMMERCIAL

## 2019-03-19 VITALS
BODY MASS INDEX: 35.15 KG/M2 | HEIGHT: 63 IN | DIASTOLIC BLOOD PRESSURE: 76 MMHG | WEIGHT: 198.4 LBS | HEART RATE: 76 BPM | SYSTOLIC BLOOD PRESSURE: 138 MMHG

## 2019-03-19 DIAGNOSIS — I10 ESSENTIAL HYPERTENSION: ICD-10-CM

## 2019-03-19 DIAGNOSIS — N18.31 CHRONIC KIDNEY DISEASE, STAGE 3A (HCC): Primary | ICD-10-CM

## 2019-03-19 DIAGNOSIS — N28.1 RENAL CYST, ACQUIRED, RIGHT: ICD-10-CM

## 2019-03-19 LAB
ATRIAL RATE: 88 BPM
P AXIS: 30 DEGREES
PR INTERVAL: 166 MS
QRS AXIS: 89 DEGREES
QRSD INTERVAL: 80 MS
QT INTERVAL: 370 MS
QTC INTERVAL: 447 MS
T WAVE AXIS: 65 DEGREES
VENTRICULAR RATE: 88 BPM

## 2019-03-19 PROCEDURE — 93010 ELECTROCARDIOGRAM REPORT: CPT | Performed by: INTERNAL MEDICINE

## 2019-03-19 PROCEDURE — 99214 OFFICE O/P EST MOD 30 MIN: CPT | Performed by: INTERNAL MEDICINE

## 2019-03-19 NOTE — PATIENT INSTRUCTIONS
1  chronic kidney disease, stage 3a per CKD EPI equation (eGFR 51ml/min) likely d/t aging kidney +/- hypertension nephrosclerosis   -Sept 2018 Cr 1 37 and stable  sCr ranges 1 1-1 3, repeat BMP now  -formal UA shows pH 8,  leukocytes, rare RBC, 3-5 WBC  -renal ultrasound shows normal kidneys with right renal cyst  -CKD may be due to HTN nephrosclerosis +/- physiologic CKD of aging  -urine protein:Cr 0 18, FLC 1 55 - acceptable for CKD, SPEP/UPEP negative for monoclonal gammopathy  -as no significant proteinuria or hematuria on UA, will defer glomerular work up  -no significant proteinuria noted    2  renal cyst, acquired - stable as of renal ultrasound performed last month  Size 1 7-1 8cm       3  HTN - BP well controlled for age/CKD  -continue diltiazem XR 240mg daily, hydrochlorothiazide 25mg daily, lisinopril 5mg daily  -Should monitor BP at home  If BP > 140/90 consistently call the nephrology office      4  CKD BMD - Mag/phos/PTH/25(OH) vitamin D levels acceptable  Continue calcium vitamin D3 combination pill daily  Will repeat mag/phos/PTH levels     5  Pedal edema - improved with leg elevation  Avoid high salt diet  On hydrochlorothiazide which is a water pill  Echo performed 4/2018 showed normal systolic function       RTC in 6 months

## 2019-03-19 NOTE — PROGRESS NOTES
NEPHROLOGY OUTPATIENT PROGRESS NOTE   Mayra Dyer 68 y o  female MRN: [id number]  DATE: 3/19/2019  Reason for visit:   Chief Complaint   Patient presents with    Follow-up        Patient Instructions   1  chronic kidney disease, stage 3a per CKD EPI equation (eGFR 51ml/min) likely d/t aging kidney +/- hypertension nephrosclerosis   -Sept 2018 Cr 1 37 and stable  sCr ranges 1 1-1 3, repeat BMP now  -formal UA shows pH 8,  leukocytes, rare RBC, 3-5 WBC  -renal ultrasound shows normal kidneys with right renal cyst  -CKD may be due to HTN nephrosclerosis +/- physiologic CKD of aging  -urine protein:Cr 0 18, FLC 1 55 - acceptable for CKD, SPEP/UPEP negative for monoclonal gammopathy  -as no significant proteinuria or hematuria on UA, will defer glomerular work up  -no significant proteinuria noted    2  renal cyst, acquired - stable as of renal ultrasound performed last month  Size 1 7-1 8cm       3  HTN - BP well controlled for age/CKD  -continue diltiazem XR 240mg daily, hydrochlorothiazide 25mg daily, lisinopril 5mg daily  -Should monitor BP at home  If BP > 140/90 consistently call the nephrology office      4  CKD BMD - Mag/phos/PTH/25(OH) vitamin D levels acceptable  Continue calcium vitamin D3 combination pill daily  Will repeat mag/phos/PTH levels     5  Pedal edema - improved with leg elevation  Avoid high salt diet  On hydrochlorothiazide which is a water pill  Echo performed 4/2018 showed normal systolic function  RTC in 6 months         Luz Marina Vu was seen today for follow-up  Diagnoses and all orders for this visit:    Chronic kidney disease, stage 3a ([address] )  -     Basic metabolic panel; Future  -     Magnesium; Future  -     Phosphorus; Future  -     PTH, intact;  Future    Essential hypertension    Renal cyst, acquired, right        Assessment/Plan:  1  chronic kidney disease, stage 3a per CKD EPI equation (eGFR 51ml/min) likely d/t aging kidney +/- hypertension nephrosclerosis   -Sept 2018 Cr 1 37 and stable  sCr ranges 1 1-1 3, repeat BMP now  -formal UA shows pH 8,  leukocytes, rare RBC, 3-5 WBC  -renal ultrasound shows normal kidneys with right renal cyst  -CKD may be due to HTN nephrosclerosis +/- physiologic CKD of aging  -urine protein:Cr 0 18, FLC 1 55 - acceptable for CKD, SPEP/UPEP negative for monoclonal gammopathy  -as no significant proteinuria or hematuria on UA, will defer glomerular work up  -no significant proteinuria noted    2  renal cyst, acquired - stable as of renal ultrasound performed last month  Size 1 7-1 8cm       3  HTN - BP well controlled for age/CKD  -continue diltiazem XR 240mg daily, hydrochlorothiazide 25mg daily, lisinopril 5mg daily  -Should monitor BP at home  If BP > 140/90 consistently call the nephrology office      4  CKD BMD - Mag/phos/PTH/25(OH) vitamin D levels acceptable  Continue calcium vitamin D3 combination pill daily  Will repeat mag/phos/PTH levels     5  Pedal edema - improved with leg elevation  Avoid high salt diet  On hydrochlorothiazide which is a water pill  Echo performed 4/2018 showed normal systolic function  SUBJECTIVE / INTERVAL HISTORY:  68 y o  female presents in follow up of CKD  Gregory Cordon had a very bad cold in December - beginning January 2019  Denies NSAID use  To have nodule removed from vocal cord 3/29/19  Has been hoarse since end of January and had coughed a lot  She was also using a nebulizer and Abx  She does complain of LE edema b/l but this improves with leg elevation  Review of Systems   Constitutional: Negative for chills and fever  HENT: Positive for voice change (+hoarseness)  Negative for sore throat and trouble swallowing  Eyes: Negative for visual disturbance  Respiratory: Positive for shortness of breath (with ambulation at times)  Negative for cough  Cardiovascular: Positive for leg swelling (occasional, improved with leg elevation)  Negative for chest pain     Gastrointestinal: Negative for abdominal pain, constipation, diarrhea, nausea and vomiting  Endocrine: Negative for polyuria  Genitourinary: Positive for frequency (at night)  Negative for difficulty urinating, dysuria and hematuria  Musculoskeletal: Negative for back pain and neck pain  Skin: Negative for rash  Neurological: Negative for dizziness, light-headedness and numbness  Psychiatric/Behavioral: The patient is not nervous/anxious  OBJECTIVE:  /76 (BP Location: Right arm, Patient Position: Sitting, Cuff Size: Standard)   Pulse 76   Ht 5' 3" (1 6 m)   Wt 90 kg (198 lb 6 4 oz)   BMI 35 14 kg/m²  Body mass index is 35 14 kg/m²  Physical exam:  Physical Exam   Constitutional: She appears well-developed and well-nourished  No distress  HENT:   Head: Normocephalic and atraumatic  Mouth/Throat: No oropharyngeal exudate  Eyes: Right eye exhibits no discharge  Left eye exhibits no discharge  No scleral icterus  Neck: Normal range of motion  Neck supple  No thyromegaly present  Cardiovascular: Normal rate and regular rhythm  No murmur heard  Pulmonary/Chest: Effort normal and breath sounds normal  No respiratory distress  She has no wheezes  Abdominal: Soft  Bowel sounds are normal  She exhibits no distension  Musculoskeletal: She exhibits edema (b/l LE)  Neurological: She is alert  She exhibits normal muscle tone  awake   Skin: Skin is warm and dry  No rash noted  She is not diaphoretic  Psychiatric: She has a normal mood and affect  Her behavior is normal    Nursing note and vitals reviewed        Medications:    Current Outpatient Medications:     albuterol (PROAIR HFA) 90 mcg/act inhaler, Inhale 2 puffs every 4 (four) hours as needed for wheezing, Disp: 1 Inhaler, Rfl: 5    Calcium Carbonate-Vitamin D3 600-400 MG-UNIT TABS, Take 1 tablet by mouth daily with dinner , Disp: , Rfl:     Coenzyme Q10 (CO Q 10) 100 MG CAPS, Take 1 capsule by mouth daily with lunch , Disp: , Rfl:    diltiazem (DILT-XR) 240 MG 24 hr capsule, Take 1 capsule by mouth daily with dinner , Disp: , Rfl:     fexofenadine (ALLEGRA) 180 MG tablet, Take 1 tablet by mouth daily in the early morning , Disp: , Rfl:     fluticasone-umeclidinium-vilanterol (TRELEGY ELLIPTA) 100-62 5-25 MCG/INH inhaler, Inhale 1 puff daily Rinse mouth after use  (Patient taking differently: Inhale 1 puff daily in the early morning Rinse mouth after use ), Disp: 1 Inhaler, Rfl: 5    hydrochlorothiazide (HYDRODIURIL) 25 mg tablet, Take 1 tablet by mouth daily in the early morning , Disp: , Rfl:     levothyroxine 112 mcg tablet, Take 112 mcg by mouth daily in the early morning , Disp: , Rfl:     lisinopril (ZESTRIL) 5 mg tablet, Take 1 tablet by mouth daily in the early morning , Disp: , Rfl:     lovastatin (MEVACOR) 20 mg tablet, Take 1 tablet by mouth daily with dinner , Disp: , Rfl:     montelukast (SINGULAIR) 10 mg tablet, Take 1 tablet (10 mg total) by mouth daily (Patient taking differently: Take 10 mg by mouth daily at bedtime ), Disp: 30 tablet, Rfl: 11    Multiple Vitamins-Minerals (MULTI FOR HER PO), Take 1 tablet by mouth daily with breakfast, Disp: , Rfl:     SUPER B COMPLEX/C CAPS, Take 1 capsule by mouth daily with dinner , Disp: , Rfl:     Allergies:   Allergies as of 03/19/2019 - Reviewed 03/19/2019   Allergen Reaction Noted    Pollen extract Allergic Rhinitis and Cough 03/13/2015       The following portions of the patient's history were reviewed and updated as appropriate: past family history, past surgical history and problem list     Laboratory Results:  Lab Results   Component Value Date    K 4 4 03/18/2019        No results found for: PTH, CALCIUM, CAION, PHOS    Portions of the record may have been created with voice recognition software   Occasional wrong word or "sound a like" substitutions may have occurred due to the inherent limitations of voice recognition software   Read the chart carefully and recognize, using context, where substitutions have occurred

## 2019-03-20 ENCOUNTER — HOSPITAL ENCOUNTER (OUTPATIENT)
Dept: CT IMAGING | Facility: HOSPITAL | Age: 74
Discharge: HOME | End: 2019-03-20
Attending: INTERNAL MEDICINE
Payer: COMMERCIAL

## 2019-03-20 DIAGNOSIS — J44.9 COPD, SEVERE (HCC): ICD-10-CM

## 2019-03-23 DIAGNOSIS — J45.909 UNCOMPLICATED ASTHMA, UNSPECIFIED ASTHMA SEVERITY, UNSPECIFIED WHETHER PERSISTENT: ICD-10-CM

## 2019-03-25 RX ORDER — MONTELUKAST SODIUM 10 MG/1
TABLET ORAL
Qty: 90 TABLET | Refills: 3 | Status: SHIPPED | OUTPATIENT
Start: 2019-03-25 | End: 2019-09-05 | Stop reason: SDUPTHER

## 2019-03-27 DIAGNOSIS — J44.9 CHRONIC OBSTRUCTIVE PULMONARY DISEASE, UNSPECIFIED COPD TYPE (HCC): Primary | ICD-10-CM

## 2019-03-27 RX ORDER — ALBUTEROL SULFATE 90 UG/1
2 AEROSOL, METERED RESPIRATORY (INHALATION) EVERY 6 HOURS PRN
Qty: 18 G | Refills: 5 | Status: SHIPPED | OUTPATIENT
Start: 2019-03-27 | End: 2020-07-15 | Stop reason: SDUPTHER

## 2019-03-29 ENCOUNTER — ANESTHESIA EVENT (OUTPATIENT)
Dept: PERIOP | Facility: HOSPITAL | Age: 74
End: 2019-03-29
Payer: COMMERCIAL

## 2019-03-29 ENCOUNTER — ANESTHESIA (OUTPATIENT)
Dept: PERIOP | Facility: HOSPITAL | Age: 74
End: 2019-03-29
Payer: COMMERCIAL

## 2019-03-29 ENCOUNTER — HOSPITAL ENCOUNTER (OUTPATIENT)
Facility: HOSPITAL | Age: 74
Setting detail: OUTPATIENT SURGERY
Discharge: HOME | End: 2019-03-29
Attending: OTOLARYNGOLOGY | Admitting: OTOLARYNGOLOGY
Payer: COMMERCIAL

## 2019-03-29 VITALS
RESPIRATION RATE: 18 BRPM | WEIGHT: 194 LBS | SYSTOLIC BLOOD PRESSURE: 169 MMHG | HEIGHT: 63 IN | BODY MASS INDEX: 34.38 KG/M2 | DIASTOLIC BLOOD PRESSURE: 75 MMHG | TEMPERATURE: 97.1 F | HEART RATE: 63 BPM | OXYGEN SATURATION: 95 %

## 2019-03-29 DIAGNOSIS — J38.3 LESION OF VOCAL CORD: ICD-10-CM

## 2019-03-29 PROCEDURE — 88312 SPECIAL STAINS GROUP 1: CPT | Performed by: PATHOLOGY

## 2019-03-29 PROCEDURE — 88342 IMHCHEM/IMCYTCHM 1ST ANTB: CPT | Performed by: PATHOLOGY

## 2019-03-29 PROCEDURE — 88307 TISSUE EXAM BY PATHOLOGIST: CPT | Performed by: PATHOLOGY

## 2019-03-29 PROCEDURE — 31541 LARYNSCOP W/TUMR EXC + SCOPE: CPT | Performed by: OTOLARYNGOLOGY

## 2019-03-29 RX ORDER — METOCLOPRAMIDE HYDROCHLORIDE 5 MG/ML
10 INJECTION INTRAMUSCULAR; INTRAVENOUS ONCE AS NEEDED
Status: DISCONTINUED | OUTPATIENT
Start: 2019-03-29 | End: 2019-03-29 | Stop reason: HOSPADM

## 2019-03-29 RX ORDER — EPINEPHRINE 1 MG/ML
INJECTION, SOLUTION, CONCENTRATE INTRAVENOUS AS NEEDED
Status: DISCONTINUED | OUTPATIENT
Start: 2019-03-29 | End: 2019-03-29 | Stop reason: HOSPADM

## 2019-03-29 RX ORDER — HYDROMORPHONE HCL/PF 1 MG/ML
0.2 SYRINGE (ML) INJECTION
Status: DISCONTINUED | OUTPATIENT
Start: 2019-03-29 | End: 2019-03-29 | Stop reason: HOSPADM

## 2019-03-29 RX ORDER — PROPOFOL 10 MG/ML
INJECTION, EMULSION INTRAVENOUS AS NEEDED
Status: DISCONTINUED | OUTPATIENT
Start: 2019-03-29 | End: 2019-03-29 | Stop reason: SURG

## 2019-03-29 RX ORDER — EPINEPHRINE NASAL SOLUTION 1 MG/ML
SOLUTION NASAL AS NEEDED
Status: DISCONTINUED | OUTPATIENT
Start: 2019-03-29 | End: 2019-03-29 | Stop reason: HOSPADM

## 2019-03-29 RX ORDER — ACETAMINOPHEN 160 MG/5ML
650 SUSPENSION, ORAL (FINAL DOSE FORM) ORAL ONCE
Status: DISCONTINUED | OUTPATIENT
Start: 2019-03-29 | End: 2019-03-29

## 2019-03-29 RX ORDER — NEOSTIGMINE METHYLSULFATE 1 MG/ML
INJECTION INTRAVENOUS AS NEEDED
Status: DISCONTINUED | OUTPATIENT
Start: 2019-03-29 | End: 2019-03-29 | Stop reason: SURG

## 2019-03-29 RX ORDER — GLYCOPYRROLATE 0.2 MG/ML
INJECTION INTRAMUSCULAR; INTRAVENOUS AS NEEDED
Status: DISCONTINUED | OUTPATIENT
Start: 2019-03-29 | End: 2019-03-29 | Stop reason: SURG

## 2019-03-29 RX ORDER — MAGNESIUM HYDROXIDE 1200 MG/15ML
LIQUID ORAL AS NEEDED
Status: DISCONTINUED | OUTPATIENT
Start: 2019-03-29 | End: 2019-03-29 | Stop reason: HOSPADM

## 2019-03-29 RX ORDER — ACETAMINOPHEN 160 MG/5ML
650 SUSPENSION, ORAL (FINAL DOSE FORM) ORAL ONCE
Status: DISCONTINUED | OUTPATIENT
Start: 2019-03-29 | End: 2019-03-29 | Stop reason: HOSPADM

## 2019-03-29 RX ORDER — DIPHENHYDRAMINE HYDROCHLORIDE 50 MG/ML
12.5 INJECTION INTRAMUSCULAR; INTRAVENOUS ONCE AS NEEDED
Status: DISCONTINUED | OUTPATIENT
Start: 2019-03-29 | End: 2019-03-29 | Stop reason: HOSPADM

## 2019-03-29 RX ORDER — ESMOLOL HYDROCHLORIDE 10 MG/ML
INJECTION INTRAVENOUS AS NEEDED
Status: DISCONTINUED | OUTPATIENT
Start: 2019-03-29 | End: 2019-03-29 | Stop reason: SURG

## 2019-03-29 RX ORDER — SODIUM CHLORIDE 9 MG/ML
INJECTION, SOLUTION INTRAVENOUS CONTINUOUS PRN
Status: DISCONTINUED | OUTPATIENT
Start: 2019-03-29 | End: 2019-03-29 | Stop reason: SURG

## 2019-03-29 RX ORDER — DEXAMETHASONE SODIUM PHOSPHATE 4 MG/ML
INJECTION, SOLUTION INTRA-ARTICULAR; INTRALESIONAL; INTRAMUSCULAR; INTRAVENOUS; SOFT TISSUE AS NEEDED
Status: DISCONTINUED | OUTPATIENT
Start: 2019-03-29 | End: 2019-03-29 | Stop reason: SURG

## 2019-03-29 RX ORDER — FENTANYL CITRATE 50 UG/ML
INJECTION, SOLUTION INTRAMUSCULAR; INTRAVENOUS AS NEEDED
Status: DISCONTINUED | OUTPATIENT
Start: 2019-03-29 | End: 2019-03-29 | Stop reason: SURG

## 2019-03-29 RX ORDER — ROCURONIUM BROMIDE 10 MG/ML
INJECTION, SOLUTION INTRAVENOUS AS NEEDED
Status: DISCONTINUED | OUTPATIENT
Start: 2019-03-29 | End: 2019-03-29 | Stop reason: SURG

## 2019-03-29 RX ORDER — ONDANSETRON 2 MG/ML
INJECTION INTRAMUSCULAR; INTRAVENOUS AS NEEDED
Status: DISCONTINUED | OUTPATIENT
Start: 2019-03-29 | End: 2019-03-29 | Stop reason: SURG

## 2019-03-29 RX ORDER — ONDANSETRON 2 MG/ML
4 INJECTION INTRAMUSCULAR; INTRAVENOUS ONCE AS NEEDED
Status: DISCONTINUED | OUTPATIENT
Start: 2019-03-29 | End: 2019-03-29 | Stop reason: HOSPADM

## 2019-03-29 RX ORDER — HYDROMORPHONE HCL/PF 1 MG/ML
0.5 SYRINGE (ML) INJECTION
Status: DISCONTINUED | OUTPATIENT
Start: 2019-03-29 | End: 2019-03-29 | Stop reason: HOSPADM

## 2019-03-29 RX ORDER — FENTANYL CITRATE/PF 50 MCG/ML
50 SYRINGE (ML) INJECTION
Status: DISCONTINUED | OUTPATIENT
Start: 2019-03-29 | End: 2019-03-29 | Stop reason: HOSPADM

## 2019-03-29 RX ADMIN — FENTANYL CITRATE 100 MCG: 50 INJECTION INTRAMUSCULAR; INTRAVENOUS at 12:38

## 2019-03-29 RX ADMIN — ONDANSETRON 4 MG: 2 INJECTION INTRAMUSCULAR; INTRAVENOUS at 12:44

## 2019-03-29 RX ADMIN — ROCURONIUM BROMIDE 30 MG: 10 INJECTION, SOLUTION INTRAVENOUS at 12:38

## 2019-03-29 RX ADMIN — GLYCOPYRROLATE 0.6 MG: 0.2 INJECTION, SOLUTION INTRAMUSCULAR; INTRAVENOUS at 13:18

## 2019-03-29 RX ADMIN — NEOSTIGMINE METHYLSULFATE 4 MG: 1 INJECTION INTRAVENOUS at 13:18

## 2019-03-29 RX ADMIN — ESMOLOL HYDROCHLORIDE 30 MG: 10 INJECTION, SOLUTION INTRAVENOUS at 12:55

## 2019-03-29 RX ADMIN — LIDOCAINE HYDROCHLORIDE 60 MG: 20 INJECTION, SOLUTION INTRAVENOUS at 12:38

## 2019-03-29 RX ADMIN — SODIUM CHLORIDE: 0.9 INJECTION, SOLUTION INTRAVENOUS at 12:20

## 2019-03-29 RX ADMIN — PROPOFOL 150 MG: 10 INJECTION, EMULSION INTRAVENOUS at 12:38

## 2019-03-29 RX ADMIN — DEXAMETHASONE SODIUM PHOSPHATE 10 MG: 4 INJECTION, SOLUTION INTRAMUSCULAR; INTRAVENOUS at 12:44

## 2019-03-29 RX ADMIN — ROCURONIUM BROMIDE 10 MG: 10 INJECTION, SOLUTION INTRAVENOUS at 12:51

## 2019-03-29 RX ADMIN — SODIUM CHLORIDE: 0.9 INJECTION, SOLUTION INTRAVENOUS at 13:32

## 2019-03-29 NOTE — ANESTHESIA PREPROCEDURE EVALUATION
Review of Systems/Medical History  Patient summary reviewed  Chart reviewed  No history of anesthetic complications     Cardiovascular  Hyperlipidemia, Hypertension ,    Pulmonary  COPD , Asthma ,        GI/Hepatic  Negative GI/hepatic ROS          Kidney disease CKD, Chronic kidney disease stage 3,   Comment: Renal cyst     Endo/Other  History of thyroid disease , hypothyroidism,   Comment: Vocal cord cancer c/b new lesion Obesity    GYN    Breast cancer        Hematology  Negative hematology ROS      Musculoskeletal  Back pain , lumbar pain and spinal stenosis, Sciatica,        Neurology  Negative neurology ROS      Psychology   Negative psychology ROS              Physical Exam    Airway    Mallampati score: II  TM Distance: >3 FB  Neck ROM: full     Dental   No notable dental hx     Cardiovascular  Rhythm: regular, Rate: normal, Cardiovascular exam normal    Pulmonary  Pulmonary exam normal Breath sounds clear to auscultation,     Other Findings        Anesthesia Plan  ASA Score- 3     Anesthesia Type- general with ASA Monitors  Additional Monitors:   Airway Plan: ETT  Plan Factors-    Induction- intravenous  Postoperative Plan-     Informed Consent- Anesthetic plan and risks discussed with patient  I personally reviewed this patient with the CRNA  Discussed and agreed on the Anesthesia Plan with the CRNA  Wendy Welch Recent labs personally reviewed:  Lab Results   Component Value Date    WBC 7 14 03/18/2019    HGB 14 3 03/18/2019     03/18/2019     Lab Results   Component Value Date    K 4 4 03/18/2019     I, Aguila Willingham MD, have personally seen and evaluated the patient prior to anesthetic care  I have reviewed the pre-anesthetic record, and other medical records if appropriate to the anesthetic care  If a CRNA is involved in the case, I have reviewed the CRNA assessment, if present, and agree   Risks/benefits and alternatives discussed with patient including possible PONV, sore throat, and possibility of rare anesthetic and surgical emergencies

## 2019-03-29 NOTE — ANESTHESIA POSTPROCEDURE EVALUATION
Post-Op Assessment Note    CV Status:  Stable  Pain Score: 0    Pain management: adequate     Mental Status:  Alert and awake   Hydration Status:  Euvolemic   PONV Controlled:  Controlled   Airway Patency:  Patent   Post Op Vitals Reviewed: Yes      Staff: CRNA           /84 (03/29/19 1330)    Temp 97 9 °F (36 6 °C) (03/29/19 1330)    Pulse (!) 107 (03/29/19 1330)   Resp 14 (03/29/19 1330)    SpO2 100 % (03/29/19 1330)

## 2019-04-26 ENCOUNTER — TELEPHONE (OUTPATIENT)
Dept: PULMONOLOGY | Facility: CLINIC | Age: 74
End: 2019-04-26

## 2019-04-30 ENCOUNTER — HOSPITAL ENCOUNTER (OUTPATIENT)
Dept: RADIOLOGY | Facility: HOSPITAL | Age: 74
Discharge: HOME | End: 2019-04-30
Attending: INTERNAL MEDICINE
Payer: COMMERCIAL

## 2019-04-30 ENCOUNTER — OFFICE VISIT (OUTPATIENT)
Dept: PULMONOLOGY | Facility: CLINIC | Age: 74
End: 2019-04-30
Payer: COMMERCIAL

## 2019-04-30 VITALS
SYSTOLIC BLOOD PRESSURE: 122 MMHG | OXYGEN SATURATION: 96 % | TEMPERATURE: 98 F | BODY MASS INDEX: 35.58 KG/M2 | HEIGHT: 63 IN | DIASTOLIC BLOOD PRESSURE: 76 MMHG | WEIGHT: 200.8 LBS | HEART RATE: 79 BPM

## 2019-04-30 DIAGNOSIS — J45.40 MODERATE PERSISTENT ASTHMA WITHOUT COMPLICATION: Primary | ICD-10-CM

## 2019-04-30 DIAGNOSIS — R06.02 SOB (SHORTNESS OF BREATH): ICD-10-CM

## 2019-04-30 DIAGNOSIS — J44.9 COPD, SEVERE (HCC): ICD-10-CM

## 2019-04-30 PROCEDURE — 99213 OFFICE O/P EST LOW 20 MIN: CPT | Performed by: INTERNAL MEDICINE

## 2019-04-30 PROCEDURE — 71046 X-RAY EXAM CHEST 2 VIEWS: CPT

## 2019-05-07 ENCOUNTER — OFFICE VISIT (OUTPATIENT)
Dept: SURGICAL ONCOLOGY | Facility: CLINIC | Age: 74
End: 2019-05-07
Payer: COMMERCIAL

## 2019-05-07 VITALS
HEIGHT: 63 IN | DIASTOLIC BLOOD PRESSURE: 80 MMHG | SYSTOLIC BLOOD PRESSURE: 142 MMHG | RESPIRATION RATE: 14 BRPM | TEMPERATURE: 97.8 F | BODY MASS INDEX: 35.44 KG/M2 | WEIGHT: 200 LBS | HEART RATE: 91 BPM

## 2019-05-07 DIAGNOSIS — Z12.31 VISIT FOR SCREENING MAMMOGRAM: ICD-10-CM

## 2019-05-07 DIAGNOSIS — C50.912 LOBULAR CARCINOMA OF LEFT BREAST (HCC): ICD-10-CM

## 2019-05-07 DIAGNOSIS — N60.99 ATYPICAL DUCTAL HYPERPLASIA OF BREAST: Primary | ICD-10-CM

## 2019-05-07 PROCEDURE — 99213 OFFICE O/P EST LOW 20 MIN: CPT | Performed by: NURSE PRACTITIONER

## 2019-09-05 ENCOUNTER — OFFICE VISIT (OUTPATIENT)
Dept: PULMONOLOGY | Facility: CLINIC | Age: 74
End: 2019-09-05
Payer: COMMERCIAL

## 2019-09-05 VITALS
TEMPERATURE: 97.8 F | HEART RATE: 80 BPM | WEIGHT: 193 LBS | RESPIRATION RATE: 16 BRPM | OXYGEN SATURATION: 94 % | SYSTOLIC BLOOD PRESSURE: 100 MMHG | DIASTOLIC BLOOD PRESSURE: 60 MMHG | BODY MASS INDEX: 34.2 KG/M2 | HEIGHT: 63 IN

## 2019-09-05 DIAGNOSIS — J45.909 UNCOMPLICATED ASTHMA, UNSPECIFIED ASTHMA SEVERITY, UNSPECIFIED WHETHER PERSISTENT: ICD-10-CM

## 2019-09-05 DIAGNOSIS — J45.40 MODERATE PERSISTENT ASTHMA WITHOUT COMPLICATION: ICD-10-CM

## 2019-09-05 DIAGNOSIS — J44.9 COPD, SEVERE (HCC): Primary | ICD-10-CM

## 2019-09-05 PROCEDURE — 99213 OFFICE O/P EST LOW 20 MIN: CPT | Performed by: INTERNAL MEDICINE

## 2019-09-05 RX ORDER — MONTELUKAST SODIUM 10 MG/1
10 TABLET ORAL DAILY
Qty: 90 TABLET | Refills: 3 | Status: SHIPPED | OUTPATIENT
Start: 2019-09-05 | End: 2020-12-16 | Stop reason: SDUPTHER

## 2019-09-05 NOTE — PROGRESS NOTES
Pulmonary Follow Up Note   Daniel Pickering 69 y o  female MRN: [id number]  9/5/2019      Assessment/Plan:     COPD, severe [facility])    She is well controlled on Trelegy Ellipta  I provided her with a new prescription today  She may use albuterol as needed  She may also use Mucinex on days she has increased sputum  Moderate persistent asthma without complication    Montelukast prescription was provided today  Allergy symptoms are well controlled  Visit orders:    Diagnoses and all orders for this visit:    COPD, severe ([address] )  -     Discontinue: fluticasone-umeclidinium-vilanterol (TRELEGY ELLIPTA) 100-62 5-25 MCG/INH inhaler; Inhale 1 puff daily Rinse mouth after use  -     fluticasone-umeclidinium-vilanterol (TRELEGY ELLIPTA) 100-62 5-25 MCG/INH inhaler; Inhale 1 puff daily Rinse mouth after use  Uncomplicated asthma, unspecified asthma severity, unspecified whether persistent  -     montelukast (SINGULAIR) 10 mg tablet; Take 1 tablet (10 mg total) by mouth daily    Moderate persistent asthma without complication      Return in about 6 months (around 3/5/2020)  History of Present Illness   HPI:  Daniel Pickering is a 69 y o  female who  Is here today for follow-up regarding COPD  She is doing quite well with Trelegy Ellipta  She needed her rescue inhaler over the summer, but none since  She has occasional cough for which she takes Mucinex intermittently  She denies significant wheezing  She denies fevers or chills  Dyspnea on exertion is stable  Review of Systems   Constitutional: Negative for chills, fever and unexpected weight change  HENT: Negative for postnasal drip and sore throat  Eyes: Negative for visual disturbance  Respiratory:        As noted in HPI   Cardiovascular: Negative for chest pain  Gastrointestinal: Negative for abdominal pain, diarrhea and vomiting  Genitourinary: Negative for difficulty urinating  Skin: Negative for rash     Neurological: Negative for headaches  Hematological: Negative for adenopathy  Psychiatric/Behavioral: Negative  All other systems reviewed and are negative        Historical Information   Past Medical History:   Diagnosis Date    Cancer [facility])     vocal cord cancer, s/p radiation ()    Chronic kidney disease     COPD (chronic obstructive pulmonary disease) [address] [address]     Hyperlipidemia     Hypertension     Hypothyroidism     Renal cyst      Past Surgical History:   Procedure Laterality Date    BREAST LUMPECTOMY Left     CHOLECYSTECTOMY      COLONOSCOPY      CA LARYNGOSCOPY,DIRECT,SCOPE,INJ CORDS Left 3/29/2019    Procedure: MICROLARYNGOSCOPY AND EXCISION OF LEFT VOCAL CORD LESION;  Surgeon: Shubham Arndt MD;  Location: [facility];  Service: ENT     Family History   Problem Relation Age of Onset    Heart disease Mother     Emphysema Mother     Heart disease Father      Social History     Tobacco Use   Smoking Status Former Smoker    Packs/day: 1     Years: 50 00    Pack years: 50 00    Types: Cigarettes    Last attempt to quit: 2008    Years since quittin 6   Smokeless Tobacco Never Used     Meds/Allergies     Current Outpatient Medications:     albuterol (VENTOLIN HFA) 90 mcg/act inhaler, Inhale 2 puffs every 6 (six) hours as needed for wheezing, Disp: 18 g, Rfl: 5    Calcium Carbonate-Vitamin D3 600-400 MG-UNIT TABS, Take 1 tablet by mouth daily with dinner , Disp: , Rfl:     Coenzyme Q10 (CO Q 10) 100 MG CAPS, Take 1 capsule by mouth daily with lunch , Disp: , Rfl:     diltiazem (DILT-XR) 240 MG 24 hr capsule, Take 1 capsule by mouth daily with dinner , Disp: , Rfl:     fexofenadine (ALLEGRA) 180 MG tablet, Take 1 tablet by mouth daily in the early morning , Disp: , Rfl:     fluticasone-umeclidinium-vilanterol (TRELEGY ELLIPTA) 100-62 5-25 MCG/INH inhaler, Inhale 1 puff daily Rinse mouth after use , Disp: 1 Inhaler, Rfl: 11    hydrochlorothiazide (HYDRODIURIL) 25 mg tablet, Take 1 tablet by mouth daily in the early morning , Disp: , Rfl:     lisinopril (ZESTRIL) 5 mg tablet, Take 1 tablet by mouth daily in the early morning , Disp: , Rfl:     lovastatin (MEVACOR) 20 mg tablet, Take 1 tablet by mouth daily with dinner , Disp: , Rfl:     levothyroxine 112 mcg tablet, Take 112 mcg by mouth daily in the early morning , Disp: , Rfl:     montelukast (SINGULAIR) 10 mg tablet, Take 1 tablet (10 mg total) by mouth daily, Disp: 90 tablet, Rfl: 3    Multiple Vitamins-Minerals (MULTI FOR HER PO), Take 1 tablet by mouth daily with breakfast, Disp: , Rfl:     SUPER B COMPLEX/C CAPS, Take 1 capsule by mouth daily with dinner , Disp: , Rfl:   Allergies   Allergen Reactions    Pollen Extract Allergic Rhinitis and Cough     Vitals: Blood pressure 100/60, pulse 80, temperature 97 8 °F (36 6 °C), resp  rate 16, height 5' 3" (1 6 m), weight 87 5 kg (193 lb), SpO2 94 %  Body mass index is 34 19 kg/m²  Oxygen Therapy  SpO2: 94 %    Physical Exam   Physical Exam   Constitutional: She is oriented to person, place, and time  No distress  HENT:   Head: Normocephalic  Mouth/Throat: No oropharyngeal exudate  Eyes: Pupils are equal, round, and reactive to light  No scleral icterus  Neck: Neck supple  No JVD present  Cardiovascular: Normal rate and regular rhythm  Pulmonary/Chest: She has no wheezes  She has no rales  Abdominal: Soft  There is no tenderness  Musculoskeletal: She exhibits no edema  Lymphadenopathy:     She has no cervical adenopathy  Neurological: She is alert and oriented to person, place, and time  Skin: Skin is warm and dry  Psychiatric: She has a normal mood and affect  Labs: I have personally reviewed pertinent lab results    Lab Results   Component Value Date    WBC 7 14 03/18/2019    HGB 14 3 03/18/2019    HCT 42 8 03/18/2019    MCV 97 03/18/2019     03/18/2019     Lab Results   Component Value Date    K 4 4 03/18/2019     Imaging and other studies: I have personally reviewed pertinent reports  Chest CT 3/20/19 - 2mm LL nodule - stable / benign    Pulmonary function testing:  Performed 3/22/18   FEV1/FVC ratio 46%   FEV1 47% predicted  FVC 77% predicted

## 2019-09-05 NOTE — ASSESSMENT & PLAN NOTE
She is well controlled on Trelegy Ellipta  I provided her with a new prescription today  She may use albuterol as needed  She may also use Mucinex on days she has increased sputum

## 2019-09-24 ENCOUNTER — TELEPHONE (OUTPATIENT)
Dept: NEPHROLOGY | Facility: CLINIC | Age: 74
End: 2019-09-24

## 2019-09-24 DIAGNOSIS — N17.9 AKI (ACUTE KIDNEY INJURY) (HCC): Primary | ICD-10-CM

## 2019-09-24 NOTE — TELEPHONE ENCOUNTER
I spoke to the patient and she is aware of her creatinine being elevated  She states her BP's have been good around 135/70  She will increase her hydration and she is aware to avoids NSAIDs  She will repeat a BMP and UPC, UA this Friday before her appointment with Dr Antonina Pérez  I faxed over the labs to the [facility] on [facility] in [place]

## 2019-09-24 NOTE — TELEPHONE ENCOUNTER
----- Message from Adams Valero DO sent at 9/24/2019  8:54 AM EDT -----  9/20/19 labs reviewed: sCr 1 56  This is higher than previous  Would recommend increased hydration, avoiding nonsteroidals, and repeat BMP end of the week along with UA and UpCr which I have ordered  Please ask about patient's BP readings  Ideally, BP should be in 130-140s range  Up to systolic 033 is ok in light of age  Thanks

## 2019-09-26 LAB
CREAT ?TM UR-SCNC: 52.4 UMOL/L
EXT PROTEIN URINE: 11.7
PROT/CREAT UR: 0.22 MG/G{CREAT}

## 2019-09-27 ENCOUNTER — OFFICE VISIT (OUTPATIENT)
Dept: NEPHROLOGY | Facility: CLINIC | Age: 74
End: 2019-09-27
Payer: COMMERCIAL

## 2019-09-27 VITALS
HEIGHT: 63 IN | HEART RATE: 58 BPM | RESPIRATION RATE: 16 BRPM | DIASTOLIC BLOOD PRESSURE: 66 MMHG | WEIGHT: 192.25 LBS | BODY MASS INDEX: 34.06 KG/M2 | SYSTOLIC BLOOD PRESSURE: 100 MMHG

## 2019-09-27 DIAGNOSIS — I10 ESSENTIAL HYPERTENSION: ICD-10-CM

## 2019-09-27 DIAGNOSIS — N18.30 CKD (CHRONIC KIDNEY DISEASE) STAGE 3, GFR 30-59 ML/MIN (HCC): ICD-10-CM

## 2019-09-27 DIAGNOSIS — N17.9 AKI (ACUTE KIDNEY INJURY) (HCC): Primary | ICD-10-CM

## 2019-09-27 DIAGNOSIS — N28.1 RENAL CYST, ACQUIRED, RIGHT: ICD-10-CM

## 2019-09-27 PROCEDURE — 3078F DIAST BP <80 MM HG: CPT | Performed by: INTERNAL MEDICINE

## 2019-09-27 PROCEDURE — 3074F SYST BP LT 130 MM HG: CPT | Performed by: INTERNAL MEDICINE

## 2019-09-27 PROCEDURE — 99213 OFFICE O/P EST LOW 20 MIN: CPT | Performed by: INTERNAL MEDICINE

## 2019-09-27 NOTE — PROGRESS NOTES
NEPHROLOGY OUTPATIENT PROGRESS NOTE   Dunia Bhakta 69 y o  female MRN: [id number]  DATE: 9/27/2019  Reason for visit:   Chief Complaint   Patient presents with    Chronic Kidney Disease    Follow-up        Patient Instructions   1  chronic kidney disease, stage 3a per CKD EPI equation (eGFR 51ml/min) likely d/t aging kidney +/- hypertension nephrosclerosis  - sCr 1 41 yesterday, higher than previous  sCr ranges 1 1-1 3, repeat BMP along with UA and urine microscopy prior to next appointment   -formal UA shows pH 8,  leukocytes, rare RBC, 3-5 WBC  -renal ultrasound shows normal kidneys with right renal cyst  -CKD may be due to HTN nephrosclerosis +/- physiologic CKD of aging  -urine protein:Cr 0 22, FLC 1 55 - acceptable for CKD, SPEP/UPEP negative for monoclonal gammopathy  -as no significant proteinuria or hematuria on UA, will defer glomerular work up    2  Right renal cyst, acquired - stable as of renal ultrasound as of Feb 2019  Size 1 7-1 8cm  As cyst stable, will defer further imaging for now       3  HTN - BP well controlled for age/CKD  -continue diltiazem XR 240mg daily, hydrochlorothiazide 25mg daily, lisinopril 5mg daily  -Should monitor BP at home  If BP > 140/90 consistently call the nephrology office      4  CKD BMD - Mag/phos/PTH/25(OH) vitamin D levels acceptable  Continue calcium vitamin D3 combination pill daily  Will repeat mag/phos/PTH levels prior to next appointment     5  Pedal edema - improved with leg elevation  Avoid high salt diet  On hydrochlorothiazide which is a water pill  Echo performed 4/2018 showed normal systolic function       RTC in 6 months     Christal Kingston was seen today for chronic kidney disease and follow-up  Diagnoses and all orders for this visit:    JARETT (acute kidney injury) ([facility] [address] )  -     Basic metabolic panel; Future  -     Urinalysis with microscopic; Future  -     Protein / creatinine ratio, urine;  Future    CKD (chronic kidney disease) stage 3, GFR 30-59 ml/min ([facility])  -     Magnesium; Future  -     Phosphorus; Future  -     PTH, intact; Future    Renal cyst, acquired, right    Essential hypertension        Assessment/Plan:  1  chronic kidney disease, stage 3a per CKD EPI equation (eGFR 51ml/min) likely d/t aging kidney +/- hypertension nephrosclerosis  - sCr 1 41 yesterday, higher than previous  sCr ranges 1 1-1 3, repeat BMP along with UA and urine microscopy prior to next appointment   -formal UA shows pH 8,  leukocytes, rare RBC, 3-5 WBC  -renal ultrasound shows normal kidneys with right renal cyst  -CKD may be due to HTN nephrosclerosis +/- physiologic CKD of aging  -urine protein:Cr 0 22, FLC 1 55 - acceptable for CKD, SPEP/UPEP negative for monoclonal gammopathy  -as no significant proteinuria or hematuria on UA, will defer glomerular work up    2  Right renal cyst, acquired - stable as of renal ultrasound as of Feb 2019  Size 1 7-1 8cm  As cyst stable, will defer further imaging for now       3  HTN - BP well controlled for age/CKD  -continue diltiazem XR 240mg daily, hydrochlorothiazide 25mg daily, lisinopril 5mg daily  -Should monitor BP at home  If BP > 140/90 consistently call the nephrology office      4  CKD BMD - Mag/phos/PTH/25(OH) vitamin D levels acceptable  Continue calcium vitamin D3 combination pill daily  Will repeat mag/phos/PTH levels prior to next appointment     5  Pedal edema - improved with leg elevation  Avoid high salt diet  On hydrochlorothiazide which is a water pill  Echo performed 4/2018 showed normal systolic function       RTC in 6 months       SUBJECTIVE / INTERVAL HISTORY:  69 y o  female presents in follow up of CKD  Jermaine Watson denies any recent illness/hospitalizations/medication changes since last office visit  Denies NSAID use  BP has been well controlled  She admits to not drinking enough water  Review of Systems   Constitutional: Negative for chills and fever     HENT: Negative for sore throat and trouble swallowing  Eyes: Negative for visual disturbance  Respiratory: Negative for cough and shortness of breath  Cardiovascular: Negative for chest pain and leg swelling  Gastrointestinal: Negative for diarrhea, nausea and vomiting  Endocrine: Negative for polyuria  Genitourinary: Negative for difficulty urinating, dysuria and hematuria  Musculoskeletal: Negative for back pain and neck pain  Skin: Negative for rash  Neurological: Negative for dizziness, light-headedness and numbness  Psychiatric/Behavioral: The patient is not nervous/anxious  OBJECTIVE:  /66 (BP Location: Left arm, Patient Position: Sitting, Cuff Size: Large)   Pulse 58   Resp 16   Ht 5' 3" (1 6 m)   Wt 87 2 kg (192 lb 4 oz)   BMI 34 06 kg/m²  Body mass index is 34 06 kg/m²  Physical exam:  Physical Exam   Constitutional: She appears well-developed and well-nourished  No distress  HENT:   Head: Normocephalic and atraumatic  Mouth/Throat: No oropharyngeal exudate  Eyes: Right eye exhibits no discharge  Left eye exhibits no discharge  No scleral icterus  Neck: Normal range of motion  Neck supple  No thyromegaly present  Cardiovascular: Normal rate and regular rhythm  No murmur heard  Pulmonary/Chest: Effort normal and breath sounds normal  No respiratory distress  She has no wheezes  Abdominal: Soft  Bowel sounds are normal  She exhibits no distension  Musculoskeletal: She exhibits edema (b/l ankles)  Neurological: She is alert  She exhibits normal muscle tone  awake   Skin: Skin is warm and dry  No rash noted  She is not diaphoretic  Psychiatric: She has a normal mood and affect  Her behavior is normal    Nursing note and vitals reviewed        Medications:    Current Outpatient Medications:     albuterol (VENTOLIN HFA) 90 mcg/act inhaler, Inhale 2 puffs every 6 (six) hours as needed for wheezing, Disp: 18 g, Rfl: 5    Calcium Carbonate-Vitamin D3 600-400 MG-UNIT TABS, Take 1 tablet by mouth daily with dinner , Disp: , Rfl:     Coenzyme Q10 (CO Q 10) 100 MG CAPS, Take 1 capsule by mouth daily with lunch , Disp: , Rfl:     diltiazem (DILT-XR) 240 MG 24 hr capsule, Take 1 capsule by mouth daily with dinner , Disp: , Rfl:     fexofenadine (ALLEGRA) 180 MG tablet, Take 1 tablet by mouth daily in the early morning , Disp: , Rfl:     fluticasone-umeclidinium-vilanterol (TRELEGY ELLIPTA) 100-62 5-25 MCG/INH inhaler, Inhale 1 puff daily Rinse mouth after use , Disp: 1 Inhaler, Rfl: 11    hydrochlorothiazide (HYDRODIURIL) 25 mg tablet, Take 1 tablet by mouth daily in the early morning , Disp: , Rfl:     lisinopril (ZESTRIL) 5 mg tablet, Take 1 tablet by mouth daily in the early morning , Disp: , Rfl:     lovastatin (MEVACOR) 20 mg tablet, Take 1 tablet by mouth daily with dinner , Disp: , Rfl:     montelukast (SINGULAIR) 10 mg tablet, Take 1 tablet (10 mg total) by mouth daily, Disp: 90 tablet, Rfl: 3    Multiple Vitamins-Minerals (MULTI FOR HER PO), Take 1 tablet by mouth daily with breakfast, Disp: , Rfl:     SUPER B COMPLEX/C CAPS, Take 1 capsule by mouth daily with dinner , Disp: , Rfl:     levothyroxine 112 mcg tablet, Take 112 mcg by mouth daily in the early morning , Disp: , Rfl:     Allergies:   Allergies as of 09/27/2019 - Reviewed 09/27/2019   Allergen Reaction Noted    Pollen extract Allergic Rhinitis and Cough 03/13/2015       The following portions of the patient's history were reviewed and updated as appropriate: past family history, past surgical history and problem list     Laboratory Results:  Lab Results   Component Value Date    K 4 4 03/18/2019        No results found for: PTH, CALCIUM, CAION, PHOS    Portions of the record may have been created with voice recognition software   Occasional wrong word or "sound a like" substitutions may have occurred due to the inherent limitations of voice recognition software   Read the chart carefully and recognize, using context, where substitutions have occurred

## 2019-09-27 NOTE — PATIENT INSTRUCTIONS
1  chronic kidney disease, stage 3a per CKD EPI equation (eGFR 51ml/min) likely d/t aging kidney +/- hypertension nephrosclerosis  - sCr 1 41 yesterday, higher than previous  sCr ranges 1 1-1 3, repeat BMP along with UA and urine microscopy prior to next appointment   -formal UA shows pH 8,  leukocytes, rare RBC, 3-5 WBC  -renal ultrasound shows normal kidneys with right renal cyst  -CKD may be due to HTN nephrosclerosis +/- physiologic CKD of aging  -urine protein:Cr 0 22, FLC 1 55 - acceptable for CKD, SPEP/UPEP negative for monoclonal gammopathy  -as no significant proteinuria or hematuria on UA, will defer glomerular work up    2  Right renal cyst, acquired - stable as of renal ultrasound as of Feb 2019  Size 1 7-1 8cm  As cyst stable, will defer further imaging for now       3  HTN - BP well controlled for age/CKD  -continue diltiazem XR 240mg daily, hydrochlorothiazide 25mg daily, lisinopril 5mg daily  -Should monitor BP at home  If BP > 140/90 consistently call the nephrology office      4  CKD BMD - Mag/phos/PTH/25(OH) vitamin D levels acceptable  Continue calcium vitamin D3 combination pill daily  Will repeat mag/phos/PTH levels prior to next appointment     5  Pedal edema - improved with leg elevation  Avoid high salt diet  On hydrochlorothiazide which is a water pill  Echo performed 4/2018 showed normal systolic function       RTC in 6 months

## 2019-11-07 ENCOUNTER — HOSPITAL ENCOUNTER (OUTPATIENT)
Dept: RADIOLOGY | Age: 74
Discharge: HOME | End: 2019-11-07
Payer: COMMERCIAL

## 2019-11-07 VITALS — HEIGHT: 63 IN | BODY MASS INDEX: 34.02 KG/M2 | WEIGHT: 192 LBS

## 2019-11-07 DIAGNOSIS — Z12.31 VISIT FOR SCREENING MAMMOGRAM: ICD-10-CM

## 2019-11-07 PROCEDURE — 77063 BREAST TOMOSYNTHESIS BI: CPT

## 2019-11-07 PROCEDURE — 77067 SCR MAMMO BI INCL CAD: CPT

## 2020-03-03 ENCOUNTER — OFFICE VISIT (OUTPATIENT)
Dept: PULMONOLOGY | Facility: CLINIC | Age: 75
End: 2020-03-03
Payer: COMMERCIAL

## 2020-03-03 VITALS
HEART RATE: 88 BPM | WEIGHT: 189.8 LBS | TEMPERATURE: 98.5 F | SYSTOLIC BLOOD PRESSURE: 118 MMHG | OXYGEN SATURATION: 95 % | HEIGHT: 63 IN | BODY MASS INDEX: 33.63 KG/M2 | DIASTOLIC BLOOD PRESSURE: 70 MMHG

## 2020-03-03 DIAGNOSIS — Z87.891 HISTORY OF TOBACCO ABUSE: ICD-10-CM

## 2020-03-03 DIAGNOSIS — J45.40 MODERATE PERSISTENT ASTHMA WITHOUT COMPLICATION: ICD-10-CM

## 2020-03-03 DIAGNOSIS — J44.9 COPD, SEVERE (HCC): Primary | ICD-10-CM

## 2020-03-03 PROCEDURE — 99213 OFFICE O/P EST LOW 20 MIN: CPT | Performed by: INTERNAL MEDICINE

## 2020-03-03 NOTE — PROGRESS NOTES
Pulmonary Follow Up Note   Hector Nevarez 69 y o  female MRN: [id number]  3/3/2020      Assessment/Plan: Moderate persistent asthma without complication    Patient has an overlap of asthma and COPD  She is doing extremely well with Trelegy Ellipta once daily  She has not needed her rescue inhaler on any regular basis  History of tobacco abuse   Patient quit smoking 12 years ago  We have elected to perform lung cancer screening CT every other year  She will have one done in March 2021 and follow-up thereafter  Visit orders:    Diagnoses and all orders for this visit:    COPD, severe ([address] )    Moderate persistent asthma without complication    History of tobacco abuse  -     CT lung screening program; Future        Return in about 1 year (around 3/3/2021)  History of Present Illness   HPI:  Hector Nevarez is a 69 y o  female whoIs here today for follow-up regarding asthma / COPD  She is doing extremely well from pulmonary perspective  She is compliant with Trelegy Ellipta once daily  She has not needed her rescue inhaler  No cough, wheeze or sputum production  Weight has been stable  She remains active  She denies shortness of breath at rest and has dyspnea with only moderate to severe exertion  Review of Systems   Constitutional: Negative for chills, fever and unexpected weight change  HENT: Negative for postnasal drip and sore throat  Eyes: Negative for visual disturbance  Respiratory:        As noted in HPI   Cardiovascular: Negative for chest pain  Gastrointestinal: Negative for abdominal pain, diarrhea and vomiting  Genitourinary: Negative for difficulty urinating  Skin: Negative for rash  Neurological: Negative for headaches  Hematological: Negative for adenopathy  Psychiatric/Behavioral: Negative  All other systems reviewed and are negative          Historical Information   Past Medical History:   Diagnosis Date    Cancer [facility])     vocal cord cancer, s/p radiation ()    Chronic kidney disease     COPD [facility]chronic obstructive pulmonary disease) ([facility] [address] )     Hyperlipidemia     Hypertension     Hypothyroidism     Renal cyst      Past Surgical History:   Procedure Laterality Date    BREAST BIOPSY Left 2012    in situ    BREAST LUMPECTOMY Left 2012    in situ    CHOLECYSTECTOMY      COLONOSCOPY      IL LARYNGOSCOPY,DIRECT,SCOPE,INJ CORDS Left 3/29/2019    Procedure: MICROLARYNGOSCOPY AND EXCISION OF LEFT VOCAL CORD LESION;  Surgeon: Kayden Ren MD;  [facility]: [facility];  Service: ENT    SKIN CANCER EXCISION  2011    Squamous cell    TONSILLECTOMY       Family History   Problem Relation Age of Onset    Heart disease Mother     Emphysema Mother     Diabetes Mother     Heart disease Father     No Known Problems Daughter     No Known Problems Maternal Grandmother     COPD Maternal Grandfather     No Known Problems Paternal Grandmother     Stroke Paternal Grandfather     No Known Problems Son     No Known Problems Maternal Aunt     No Known Problems Paternal Aunt     No Known Problems Paternal Aunt     No Known Problems Paternal Aunt        Social History     Tobacco Use   Smoking Status Former Smoker    Packs/day: 1 00    Years: 50 00    Pack years: 50 00    Types: Cigarettes    Last attempt to quit: 2008    Years since quittin 1   Smokeless Tobacco Never Used     Meds/Allergies     Current Outpatient Medications:     albuterol (VENTOLIN HFA) 90 mcg/act inhaler, Inhale 2 puffs every 6 (six) hours as needed for wheezing, Disp: 18 g, Rfl: 5    Calcium Carbonate-Vitamin D3 600-400 MG-UNIT TABS, Take 1 tablet by mouth daily with dinner , Disp: , Rfl:     Coenzyme Q10 (CO Q 10) 100 MG CAPS, Take 1 capsule by mouth daily with lunch , Disp: , Rfl:     diltiazem (DILT-XR) 240 MG 24 hr capsule, Take 1 capsule by mouth daily with dinner , Disp: , Rfl:     fexofenadine (ALLEGRA) 180 MG tablet, Take 1 tablet by mouth daily in the early morning , Disp: , Rfl:     fluticasone-umeclidinium-vilanterol (TRELEGY ELLIPTA) 100-62 5-25 MCG/INH inhaler, Inhale 1 puff daily Rinse mouth after use , Disp: 1 Inhaler, Rfl: 11    hydrochlorothiazide (HYDRODIURIL) 25 mg tablet, Take 1 tablet by mouth daily in the early morning , Disp: , Rfl:     levothyroxine 112 mcg tablet, Take 112 mcg by mouth daily in the early morning , Disp: , Rfl:     lisinopril (ZESTRIL) 5 mg tablet, Take 1 tablet by mouth daily in the early morning , Disp: , Rfl:     lovastatin (MEVACOR) 20 mg tablet, Take 1 tablet by mouth daily with dinner , Disp: , Rfl:     montelukast (SINGULAIR) 10 mg tablet, Take 1 tablet (10 mg total) by mouth daily, Disp: 90 tablet, Rfl: 3    Multiple Vitamins-Minerals (MULTI FOR HER PO), Take 1 tablet by mouth daily with breakfast, Disp: , Rfl:     SUPER B COMPLEX/C CAPS, Take 1 capsule by mouth daily with dinner , Disp: , Rfl:   Allergies   Allergen Reactions    Pollen Extract Allergic Rhinitis and Cough     Vitals: Blood pressure 118/70, pulse 88, temperature 98 5 °F (36 9 °C), temperature source Tympanic, height 5' 3" (1 6 m), weight 86 1 kg (189 lb 12 8 oz), SpO2 95 %  Body mass index is 33 62 kg/m²  Oxygen Therapy  SpO2: 95 %  Oxygen Therapy: None (Room air)    Physical Exam   Constitutional: She is oriented to person, place, and time  No distress  HENT:   Head: Normocephalic  Mouth/Throat: No oropharyngeal exudate  Eyes: Pupils are equal, round, and reactive to light  No scleral icterus  Neck: Neck supple  No JVD present  Cardiovascular: Normal rate and regular rhythm  Pulmonary/Chest: She has no wheezes  She has no rales  Abdominal: Soft  There is no tenderness  Musculoskeletal: She exhibits no edema  Lymphadenopathy:     She has no cervical adenopathy  Neurological: She is alert and oriented to person, place, and time  Skin: Skin is warm and dry  Psychiatric: She has a normal mood and affect  Labs:  I have personally reviewed pertinent lab results  Lab Results   Component Value Date    WBC 7 14 03/18/2019    HGB 14 3 03/18/2019    HCT 42 8 03/18/2019    MCV 97 03/18/2019     03/18/2019     Lab Results   Component Value Date    K 4 4 03/18/2019     No results found for: IGE  No results found for: ALT, AST, GGT, ALKPHOS, BILITOT    Imaging and other studies: I have personally reviewed pertinent reports  and I have personally reviewed pertinent films in PACS Chest CT 3/20/19 - Stable benign 2 mm left lower lobe pulmonary nodule    Pulmonary function testing:  Performed 3/22/2018   FEV1/FVC ratio 46%   FEV1 47% predicted  FVC 77% predicted    Severe obstruction

## 2020-03-03 NOTE — ASSESSMENT & PLAN NOTE
Patient quit smoking 12 years ago  We have elected to perform lung cancer screening CT every other year  She will have one done in March 2021 and follow-up thereafter

## 2020-03-03 NOTE — ASSESSMENT & PLAN NOTE
Patient has an overlap of asthma and COPD  She is doing extremely well with Trelegy Ellipta once daily  She has not needed her rescue inhaler on any regular basis

## 2020-03-13 LAB
CREAT ?TM UR-SCNC: 50.4 UMOL/L
EXT PROTEIN URINE: 5.3
PROT/CREAT UR: 0.11 MG/G{CREAT}

## 2020-03-18 ENCOUNTER — OFFICE VISIT (OUTPATIENT)
Dept: NEPHROLOGY | Facility: CLINIC | Age: 75
End: 2020-03-18
Payer: COMMERCIAL

## 2020-03-18 VITALS
BODY MASS INDEX: 33.13 KG/M2 | HEIGHT: 63 IN | DIASTOLIC BLOOD PRESSURE: 74 MMHG | RESPIRATION RATE: 16 BRPM | SYSTOLIC BLOOD PRESSURE: 122 MMHG | HEART RATE: 76 BPM | WEIGHT: 187 LBS

## 2020-03-18 DIAGNOSIS — I12.9 STAGE 3 CHRONIC KIDNEY DISEASE DUE TO BENIGN HYPERTENSION (HCC): Primary | ICD-10-CM

## 2020-03-18 DIAGNOSIS — N18.30 STAGE 3 CHRONIC KIDNEY DISEASE DUE TO BENIGN HYPERTENSION (HCC): Primary | ICD-10-CM

## 2020-03-18 DIAGNOSIS — N28.1 RENAL CYST, ACQUIRED, RIGHT: ICD-10-CM

## 2020-03-18 PROCEDURE — 99213 OFFICE O/P EST LOW 20 MIN: CPT | Performed by: INTERNAL MEDICINE

## 2020-03-18 NOTE — PATIENT INSTRUCTIONS
1  chronic kidney disease, stage 3a per CKD EPI equation (eGFR 51ml/min) likely d/t aging kidney +/- hypertension nephrosclerosis  - sCr 1 37 as of 3/13/20  sCr ranges 1 1-1 3  -UA and urine microscopy shows 3-5 RBCs,  WBCs, few bacteria, positive leuk esterase without nitrites  -renal ultrasound shows normal kidneys with right renal cyst  -CKD may be due to HTN nephrosclerosis +/- physiologic CKD of aging  -urine protein:Cr 0 11, FLC 1 55 - acceptable for CKD, SPEP/UPEP negative for monoclonal gammopathy  -as no significant proteinuria or hematuria on UA, will defer glomerular work up  -f/u BMP in 3 months and again in 6 months    2  Right renal cyst, acquired - stable as of renal ultrasound as of Feb 2019  Size 1 7-1 8cm  As cyst stable, will defer further imaging for now       3  HTN - BP well controlled for age/CKD  -continue diltiazem XR 240mg daily, hydrochlorothiazide 25mg daily, lisinopril 5mg daily  -Should monitor BP at home  If BP > 140/90 consistently call the nephrology office      4  CKD BMD - Mag/phos/PTH/25(OH) vitamin D levels acceptable as of March 13, 2020  Continue calcium vitamin D3 combination pill daily       RTC in 6 months  Obtain bloodwork prior to next office visit and also in June 2020

## 2020-03-18 NOTE — PROGRESS NOTES
NEPHROLOGY OUTPATIENT PROGRESS NOTE   Osmin Valverde 69 y o  female MRN: [id number]  DATE: 3/18/2020  Reason for visit:   Chief Complaint   Patient presents with    Chronic Kidney Disease    Follow-up        Patient Instructions   1  chronic kidney disease, stage 3a per CKD EPI equation (eGFR 51ml/min) likely d/t aging kidney +/- hypertension nephrosclerosis  - sCr 1 37 as of 3/13/20  sCr ranges 1 1-1 3  -UA and urine microscopy shows 3-5 RBCs,  WBCs, few bacteria, positive leuk esterase without nitrites  -renal ultrasound shows normal kidneys with right renal cyst  -CKD may be due to HTN nephrosclerosis +/- physiologic CKD of aging  -urine protein:Cr 0 11, FLC 1 55 - acceptable for CKD, SPEP/UPEP negative for monoclonal gammopathy  -as no significant proteinuria or hematuria on UA, will defer glomerular work up  -f/u BMP in 3 months and again in 6 months    2  Right renal cyst, acquired - stable as of renal ultrasound as of Feb 2019  Size 1 7-1 8cm  As cyst stable, will defer further imaging for now       3  HTN - BP well controlled for age/CKD  -continue diltiazem XR 240mg daily, hydrochlorothiazide 25mg daily, lisinopril 5mg daily  -Should monitor BP at home  If BP > 140/90 consistently call the nephrology office      4  CKD BMD - Mag/phos/PTH/25(OH) vitamin D levels acceptable as of March 13, 2020  Continue calcium vitamin D3 combination pill daily       RTC in 6 months  Obtain bloodwork prior to next office visit and also in June 2020  Farheen Kurt was seen today for chronic kidney disease and follow-up  Diagnoses and all orders for this visit:    Stage 3 chronic kidney disease due to benign hypertension ([facility] [address] )  -     Basic metabolic panel; Future  -     Basic metabolic panel;  Future    Renal cyst, acquired, right        Assessment/Plan:  1  chronic kidney disease, stage 3a per CKD EPI equation (eGFR 51ml/min) likely d/t aging kidney +/- hypertension nephrosclerosis  - sCr 1 37 as of 3/13/20  sCr ranges 1 1-1 3  -UA and urine microscopy shows 3-5 RBCs,  WBCs, few bacteria, positive leuk esterase without nitrites  -renal ultrasound shows normal kidneys with right renal cyst  -CKD may be due to HTN nephrosclerosis +/- physiologic CKD of aging  -urine protein:Cr 0 11, FLC 1 55 - acceptable for CKD, SPEP/UPEP negative for monoclonal gammopathy  -as no significant proteinuria or hematuria on UA, will defer glomerular work up  -f/u BMP in 3 months and again in 6 months    2  Right renal cyst, acquired - stable as of renal ultrasound as of Feb 2019  Size 1 7-1 8cm  As cyst stable, will defer further imaging for now       3  HTN - BP well controlled for age/CKD  -continue diltiazem XR 240mg daily, hydrochlorothiazide 25mg daily, lisinopril 5mg daily  -Should monitor BP at home  If BP > 140/90 consistently call the nephrology office      4  CKD BMD - Mag/phos/PTH/25(OH) vitamin D levels acceptable as of March 13, 2020  Continue calcium vitamin D3 combination pill daily       RTC in 6 months  Obtain bloodwork prior to next office visit and also in June 2020  SUBJECTIVE / INTERVAL HISTORY:  69 y o  female presents in follow up of CKD  Carla Cm denies any recent illness/hospitalizations/medication changes since last office visit  Denies NSAID use  BP has been very well controlled  Denies edema  Review of Systems   Constitutional: Negative for chills and fever  HENT: Negative for sore throat and trouble swallowing  Eyes: Negative for visual disturbance  Respiratory: Negative for cough and shortness of breath  Cardiovascular: Negative for chest pain and leg swelling  Gastrointestinal: Negative for abdominal pain, constipation, diarrhea, nausea and vomiting  Endocrine: Negative for polyuria  Genitourinary: Negative for difficulty urinating, dysuria and hematuria  Musculoskeletal: Positive for back pain (from spinal stenosis)  Negative for neck pain     Skin: Negative for rash    Neurological: Negative for dizziness, light-headedness and numbness  Psychiatric/Behavioral: The patient is not nervous/anxious  OBJECTIVE:  /74 (BP Location: Left arm, Patient Position: Sitting, Cuff Size: Standard)   Pulse 76   Resp 16   Ht 5' 3" (1 6 m)   Wt 84 8 kg (187 lb)   BMI 33 13 kg/m²  Body mass index is 33 13 kg/m²  Physical exam:  Physical Exam   Constitutional: She appears well-developed and well-nourished  No distress  HENT:   Head: Normocephalic and atraumatic  Mouth/Throat: No oropharyngeal exudate  Eyes: Right eye exhibits no discharge  Left eye exhibits no discharge  No scleral icterus  Neck: Normal range of motion  Neck supple  No thyromegaly present  Cardiovascular: Normal rate and regular rhythm  No murmur heard  Pulmonary/Chest: Effort normal and breath sounds normal  No respiratory distress  She has no wheezes  Abdominal: Soft  Bowel sounds are normal  She exhibits no distension  Musculoskeletal: She exhibits edema (+b/l ankles)  Neurological: She is alert  She exhibits normal muscle tone  awake   Skin: Skin is warm and dry  No rash noted  She is not diaphoretic  Psychiatric: She has a normal mood and affect  Her behavior is normal    Nursing note and vitals reviewed        Medications:    Current Outpatient Medications:     albuterol (VENTOLIN HFA) 90 mcg/act inhaler, Inhale 2 puffs every 6 (six) hours as needed for wheezing, Disp: 18 g, Rfl: 5    Calcium Carbonate-Vitamin D3 600-400 MG-UNIT TABS, Take 1 tablet by mouth daily with dinner , Disp: , Rfl:     Coenzyme Q10 (CO Q 10) 100 MG CAPS, Take 1 capsule by mouth daily with lunch , Disp: , Rfl:     diltiazem (DILT-XR) 240 MG 24 hr capsule, Take 1 capsule by mouth daily with dinner , Disp: , Rfl:     fexofenadine (ALLEGRA) 180 MG tablet, Take 1 tablet by mouth daily in the early morning , Disp: , Rfl:     fluticasone-umeclidinium-vilanterol (TRELEGY ELLIPTA) 100-62 5-25 MCG/INH inhaler, Inhale 1 puff daily Rinse mouth after use , Disp: 1 Inhaler, Rfl: 11    hydrochlorothiazide (HYDRODIURIL) 25 mg tablet, Take 1 tablet by mouth daily in the early morning , Disp: , Rfl:     lisinopril (ZESTRIL) 5 mg tablet, Take 1 tablet by mouth daily in the early morning , Disp: , Rfl:     lovastatin (MEVACOR) 20 mg tablet, Take 1 tablet by mouth daily with dinner , Disp: , Rfl:     montelukast (SINGULAIR) 10 mg tablet, Take 1 tablet (10 mg total) by mouth daily, Disp: 90 tablet, Rfl: 3    Multiple Vitamins-Minerals (MULTI FOR HER PO), Take 1 tablet by mouth daily with breakfast, Disp: , Rfl:     SUPER B COMPLEX/C CAPS, Take 1 capsule by mouth daily with dinner , Disp: , Rfl:     levothyroxine 112 mcg tablet, Take 112 mcg by mouth daily in the early morning , Disp: , Rfl:     Allergies: Allergies as of 03/18/2020 - Reviewed 03/18/2020   Allergen Reaction Noted    Pollen extract Allergic Rhinitis and Cough 03/13/2015       The following portions of the patient's history were reviewed and updated as appropriate: past family history, past surgical history and problem list     Laboratory Results:  Lab Results   Component Value Date    K 4 4 03/18/2019        No results found for: PTH, CALCIUM, CAION, PHOS    Portions of the record may have been created with voice recognition software   Occasional wrong word or "sound a like" substitutions may have occurred due to the inherent limitations of voice recognition software   Read the chart carefully and recognize, using context, where substitutions have occurred

## 2020-05-05 ENCOUNTER — TELEPHONE (OUTPATIENT)
Dept: SURGICAL ONCOLOGY | Facility: CLINIC | Age: 75
End: 2020-05-05

## 2020-05-07 ENCOUNTER — OFFICE VISIT (OUTPATIENT)
Dept: SURGICAL ONCOLOGY | Facility: CLINIC | Age: 75
End: 2020-05-07
Payer: COMMERCIAL

## 2020-05-07 VITALS
HEIGHT: 63 IN | BODY MASS INDEX: 33.84 KG/M2 | TEMPERATURE: 98.8 F | DIASTOLIC BLOOD PRESSURE: 76 MMHG | WEIGHT: 191 LBS | HEART RATE: 78 BPM | SYSTOLIC BLOOD PRESSURE: 120 MMHG

## 2020-05-07 DIAGNOSIS — Z86.000 HISTORY OF LOBULAR CARCINOMA IN SITU (LCIS) OF BREAST: Primary | ICD-10-CM

## 2020-05-07 DIAGNOSIS — Z12.31 VISIT FOR SCREENING MAMMOGRAM: ICD-10-CM

## 2020-05-07 PROCEDURE — 99213 OFFICE O/P EST LOW 20 MIN: CPT | Performed by: NURSE PRACTITIONER

## 2020-06-29 ENCOUNTER — TELEPHONE (OUTPATIENT)
Dept: PULMONOLOGY | Facility: CLINIC | Age: 75
End: 2020-06-29

## 2020-07-15 DIAGNOSIS — J44.9 CHRONIC OBSTRUCTIVE PULMONARY DISEASE, UNSPECIFIED COPD TYPE (HCC): Primary | ICD-10-CM

## 2020-07-15 DIAGNOSIS — J44.9 COPD, SEVERE (HCC): Primary | ICD-10-CM

## 2020-07-15 RX ORDER — ALBUTEROL SULFATE 90 UG/1
2 AEROSOL, METERED RESPIRATORY (INHALATION) EVERY 6 HOURS PRN
Qty: 18 G | Refills: 5 | Status: SHIPPED | OUTPATIENT
Start: 2020-07-15 | End: 2021-09-14 | Stop reason: SDUPTHER

## 2020-07-15 RX ORDER — ALBUTEROL SULFATE 2.5 MG/3ML
2.5 SOLUTION RESPIRATORY (INHALATION) EVERY 6 HOURS PRN
Qty: 30 VIAL | Refills: 1 | Status: SHIPPED | OUTPATIENT
Start: 2020-07-15 | End: 2021-09-14 | Stop reason: SDUPTHER

## 2020-07-16 ENCOUNTER — HOSPITAL ENCOUNTER (OUTPATIENT)
Dept: RADIOLOGY | Facility: HOSPITAL | Age: 75
Discharge: HOME | End: 2020-07-16
Payer: COMMERCIAL

## 2020-07-16 DIAGNOSIS — J44.9 COPD, SEVERE (HCC): ICD-10-CM

## 2020-07-16 PROCEDURE — 71046 X-RAY EXAM CHEST 2 VIEWS: CPT

## 2020-10-07 DIAGNOSIS — J44.9 COPD, SEVERE (HCC): ICD-10-CM

## 2020-10-08 ENCOUNTER — TELEPHONE (OUTPATIENT)
Dept: NEPHROLOGY | Facility: HOSPITAL | Age: 75
End: 2020-10-08

## 2020-10-08 RX ORDER — FLUTICASONE FUROATE, UMECLIDINIUM BROMIDE AND VILANTEROL TRIFENATATE 100; 62.5; 25 UG/1; UG/1; UG/1
1 POWDER RESPIRATORY (INHALATION) DAILY
Qty: 1 INHALER | Refills: 5 | Status: SHIPPED | OUTPATIENT
Start: 2020-10-08 | End: 2021-03-11 | Stop reason: SDUPTHER

## 2020-10-13 ENCOUNTER — OFFICE VISIT (OUTPATIENT)
Dept: NEPHROLOGY | Facility: CLINIC | Age: 75
End: 2020-10-13
Payer: COMMERCIAL

## 2020-10-13 VITALS
HEART RATE: 71 BPM | TEMPERATURE: 97.1 F | SYSTOLIC BLOOD PRESSURE: 112 MMHG | BODY MASS INDEX: 32.78 KG/M2 | HEIGHT: 63 IN | WEIGHT: 185 LBS | DIASTOLIC BLOOD PRESSURE: 60 MMHG | RESPIRATION RATE: 16 BRPM

## 2020-10-13 DIAGNOSIS — N28.1 RENAL CYST, ACQUIRED, RIGHT: ICD-10-CM

## 2020-10-13 DIAGNOSIS — I10 ESSENTIAL HYPERTENSION: ICD-10-CM

## 2020-10-13 DIAGNOSIS — N18.32 STAGE 3B CHRONIC KIDNEY DISEASE (HCC): Primary | ICD-10-CM

## 2020-10-13 PROCEDURE — 99214 OFFICE O/P EST MOD 30 MIN: CPT | Performed by: INTERNAL MEDICINE

## 2020-11-09 ENCOUNTER — HOSPITAL ENCOUNTER (OUTPATIENT)
Dept: RADIOLOGY | Age: 75
Discharge: HOME | End: 2020-11-09
Payer: COMMERCIAL

## 2020-11-09 VITALS — WEIGHT: 180 LBS | HEIGHT: 63 IN | BODY MASS INDEX: 31.89 KG/M2

## 2020-11-09 DIAGNOSIS — Z12.31 VISIT FOR SCREENING MAMMOGRAM: ICD-10-CM

## 2020-11-09 PROCEDURE — 77067 SCR MAMMO BI INCL CAD: CPT

## 2020-11-09 PROCEDURE — 77063 BREAST TOMOSYNTHESIS BI: CPT

## 2020-12-16 DIAGNOSIS — J45.909 UNCOMPLICATED ASTHMA, UNSPECIFIED ASTHMA SEVERITY, UNSPECIFIED WHETHER PERSISTENT: ICD-10-CM

## 2020-12-16 RX ORDER — MONTELUKAST SODIUM 10 MG/1
10 TABLET ORAL DAILY
Qty: 90 TABLET | Refills: 0 | Status: SHIPPED | OUTPATIENT
Start: 2020-12-16 | End: 2021-03-11 | Stop reason: SDUPTHER

## 2021-01-20 ENCOUNTER — IMMUNIZATIONS (OUTPATIENT)
Dept: FAMILY MEDICINE CLINIC | Facility: HOSPITAL | Age: 76
End: 2021-01-20

## 2021-01-20 DIAGNOSIS — Z23 ENCOUNTER FOR IMMUNIZATION: Primary | ICD-10-CM

## 2021-01-20 PROCEDURE — 91300 SARS-COV-2 / COVID-19 MRNA VACCINE (PFIZER-BIONTECH) 30 MCG: CPT

## 2021-01-20 PROCEDURE — 0001A SARS-COV-2 / COVID-19 MRNA VACCINE (PFIZER-BIONTECH) 30 MCG: CPT

## 2021-02-09 ENCOUNTER — IMMUNIZATIONS (OUTPATIENT)
Dept: FAMILY MEDICINE CLINIC | Facility: HOSPITAL | Age: 76
End: 2021-02-09

## 2021-02-09 DIAGNOSIS — Z23 ENCOUNTER FOR IMMUNIZATION: Primary | ICD-10-CM

## 2021-02-09 PROCEDURE — 0002A SARS-COV-2 / COVID-19 MRNA VACCINE (PFIZER-BIONTECH) 30 MCG: CPT

## 2021-02-09 PROCEDURE — 91300 SARS-COV-2 / COVID-19 MRNA VACCINE (PFIZER-BIONTECH) 30 MCG: CPT

## 2021-03-11 ENCOUNTER — OFFICE VISIT (OUTPATIENT)
Dept: PULMONOLOGY | Facility: CLINIC | Age: 76
End: 2021-03-11
Payer: COMMERCIAL

## 2021-03-11 VITALS
TEMPERATURE: 97.5 F | BODY MASS INDEX: 32.42 KG/M2 | HEART RATE: 74 BPM | RESPIRATION RATE: 16 BRPM | DIASTOLIC BLOOD PRESSURE: 60 MMHG | WEIGHT: 183 LBS | SYSTOLIC BLOOD PRESSURE: 120 MMHG | OXYGEN SATURATION: 95 %

## 2021-03-11 DIAGNOSIS — F17.211 CIGARETTE NICOTINE DEPENDENCE IN REMISSION: ICD-10-CM

## 2021-03-11 DIAGNOSIS — J45.40 MODERATE PERSISTENT ASTHMA WITHOUT COMPLICATION: ICD-10-CM

## 2021-03-11 DIAGNOSIS — J45.909 UNCOMPLICATED ASTHMA, UNSPECIFIED ASTHMA SEVERITY, UNSPECIFIED WHETHER PERSISTENT: ICD-10-CM

## 2021-03-11 DIAGNOSIS — J44.9 COPD, SEVERE (HCC): Primary | ICD-10-CM

## 2021-03-11 PROCEDURE — 1036F TOBACCO NON-USER: CPT | Performed by: INTERNAL MEDICINE

## 2021-03-11 PROCEDURE — 99214 OFFICE O/P EST MOD 30 MIN: CPT | Performed by: INTERNAL MEDICINE

## 2021-03-11 PROCEDURE — 1160F RVW MEDS BY RX/DR IN RCRD: CPT | Performed by: INTERNAL MEDICINE

## 2021-03-11 RX ORDER — MONTELUKAST SODIUM 10 MG/1
10 TABLET ORAL DAILY
Qty: 90 TABLET | Refills: 3 | Status: SHIPPED | OUTPATIENT
Start: 2021-03-11 | End: 2022-02-07 | Stop reason: SDUPTHER

## 2021-03-11 RX ORDER — FLUTICASONE FUROATE, UMECLIDINIUM BROMIDE AND VILANTEROL TRIFENATATE 100; 62.5; 25 UG/1; UG/1; UG/1
1 POWDER RESPIRATORY (INHALATION) DAILY
Qty: 1 INHALER | Refills: 11 | Status: SHIPPED | OUTPATIENT
Start: 2021-03-11 | End: 2021-08-04

## 2021-03-11 NOTE — ASSESSMENT & PLAN NOTE
Singulair for prescription renewed today    She will continue with intermittent Mucinex as needed for congestion

## 2021-03-11 NOTE — ASSESSMENT & PLAN NOTE
She is doing well from pulmonary perspective with Trelegy Ellipta once daily  I have renewed her prescriptions today  She also has albuterol to use on as-needed basis

## 2021-03-11 NOTE — PROGRESS NOTES
Pulmonary Follow Up Note   Lizett Garcia 77 y o  female MRN: [id number]  3/11/2021      Assessment/Plan:     COPD, severe [facility]    She is doing well from pulmonary perspective with Trelegy Ellipta once daily  I have renewed her prescriptions today  She also has albuterol to use on as-needed basis  Moderate persistent asthma without complication    Singulair for prescription renewed today  She will continue with intermittent Mucinex as needed for congestion    Cigarette nicotine dependence in remission   Patient has a 50 pack-year smoking history and quit in 2008  We will schedule for low-dose chest CT for lung cancer screening purposes  I will call her with those results  Visit orders:    Diagnoses and all orders for this visit:    COPD, severe ([facility] [address] )  -     fluticasone-umeclidinium-vilanterol (Trelegy Ellipta) 100-62 5-25 MCG/INH inhaler; Inhale 1 puff daily    Moderate persistent asthma without complication    Cigarette nicotine dependence in remission    Uncomplicated asthma, unspecified asthma severity, unspecified whether persistent  -     montelukast (SINGULAIR) 10 mg tablet; Take 1 tablet (10 mg total) by mouth daily        Return in about 1 year (around 3/11/2022)  History of Present Illness   HPI:  Lizett Garcia is a 77 y o  female who  Is here today for follow-up regarding asthma / COPD  She is doing well from pulmonary perspective  She has been using Trelegy Ellipta once daily and has not needed her rescue inhaler on any regular basis  She has occasional congestion for which she takes Mucinex intermittently  She has no fever, chills or sweats  No wheezing  She has shortness of breath with heavy exertion, but is able to get through her daily activities without limitation  She has no fever, chills, weight loss  No recent ER visits or hospitalizations related to lung problems  She has received both COVID vaccine doses      Review of Systems   Constitutional: Negative for chills, fever and unexpected weight change  HENT: Positive for congestion  Negative for postnasal drip and sore throat  Eyes: Negative for visual disturbance  Respiratory:        As noted in HPI   Cardiovascular: Negative for chest pain  Gastrointestinal: Negative for abdominal pain, diarrhea and vomiting  Musculoskeletal: Negative for arthralgias  Skin: Negative for rash  Neurological: Negative for headaches  Hematological: Negative for adenopathy  Psychiatric/Behavioral: Negative  All other systems reviewed and are negative        Medical, Family and Social history reviewed and updated as appropriate    Historical Information   Past Medical History:   Diagnosis Date    Cancer [facility])     vocal cord cancer, s/p radiation (2008)    Chronic kidney disease     COPD (chronic obstructive pulmonary disease) ([facility] [address] )     Hyperlipidemia     Hypertension     Hypothyroidism     Renal cyst      Past Surgical History:   Procedure Laterality Date    BREAST BIOPSY Left 06/22/2012    in situ    BREAST LUMPECTOMY Left 07/24/2012    in situ    CHOLECYSTECTOMY      COLONOSCOPY      RI LARYNGOSCOPY,DIRECT,SCOPE,INJ CORDS Left 3/29/2019    Procedure: MICROLARYNGOSCOPY AND EXCISION OF LEFT VOCAL CORD LESION;  Surgeon: Lia Kelsey MD;  Location: [facility];  Service: ENT    SKIN CANCER EXCISION  07/07/2011    Squamous cell    TONSILLECTOMY       Family History   Problem Relation Age of Onset    Heart disease Mother     Emphysema Mother     Diabetes Mother     Heart disease Father     No Known Problems Daughter     No Known Problems Maternal Grandmother     COPD Maternal Grandfather     No Known Problems Paternal Grandmother     Stroke Paternal Grandfather     No Known Problems Son     No Known Problems Maternal Aunt     No Known Problems Paternal Aunt     No Known Problems Paternal Aunt     No Known Problems Paternal Aunt        Social History     Tobacco Use   Smoking Status Former Smoker    Packs/day: 1 00    Years: 50 00    Pack years: 50 00    Types: Cigarettes    Quit date: 2008    Years since quittin 2   Smokeless Tobacco Never Used     Meds/Allergies     Current Outpatient Medications:     albuterol (2 5 mg/3 mL) 0 083 % nebulizer solution, Take 1 vial (2 5 mg total) by nebulization every 6 (six) hours as needed for wheezing or shortness of breath, Disp: 30 vial, Rfl: 1    albuterol (Ventolin HFA) 90 mcg/act inhaler, Inhale 2 puffs every 6 (six) hours as needed for wheezing, Disp: 18 g, Rfl: 5    Calcium Carbonate-Vitamin D3 600-400 MG-UNIT TABS, Take 1 tablet by mouth daily with dinner , Disp: , Rfl:     Coenzyme Q10 (CO Q 10) 100 MG CAPS, Take 1 capsule by mouth daily with lunch , Disp: , Rfl:     diltiazem (DILT-XR) 240 MG 24 hr capsule, Take 1 capsule by mouth daily with dinner , Disp: , Rfl:     fexofenadine (ALLEGRA) 180 MG tablet, Take 1 tablet by mouth daily in the early morning , Disp: , Rfl:     fluticasone-umeclidinium-vilanterol (Trelegy Ellipta) 100-62 5-25 MCG/INH inhaler, Inhale 1 puff daily, Disp: 1 Inhaler, Rfl: 11    hydrochlorothiazide (HYDRODIURIL) 25 mg tablet, Take 1 tablet by mouth daily in the early morning , Disp: , Rfl:     lisinopril (ZESTRIL) 5 mg tablet, Take 1 tablet by mouth daily in the early morning , Disp: , Rfl:     lovastatin (MEVACOR) 20 mg tablet, Take 1 tablet by mouth daily with dinner , Disp: , Rfl:     montelukast (SINGULAIR) 10 mg tablet, Take 1 tablet (10 mg total) by mouth daily, Disp: 90 tablet, Rfl: 3    Multiple Vitamins-Minerals (MULTI FOR HER PO), Take 1 tablet by mouth daily with breakfast, Disp: , Rfl:     SUPER B COMPLEX/C CAPS, Take 1 capsule by mouth daily with dinner , Disp: , Rfl:     levothyroxine 112 mcg tablet, Take 112 mcg by mouth daily in the early morning , Disp: , Rfl:   Allergies   Allergen Reactions    Pollen Extract Allergic Rhinitis and Cough     Vitals: Blood pressure 120/60, pulse 74, temperature 97 5 °F (36 4 °C), resp  rate 16, weight 83 kg (183 lb), SpO2 95 %  Body mass index is 32 42 kg/m²  Oxygen Therapy  SpO2: 95 %    Physical Exam  Constitutional:       General: She is not in acute distress  HENT:      Head: Normocephalic  Mouth/Throat:      Pharynx: No oropharyngeal exudate  Eyes:      General: No scleral icterus  Neck:      Musculoskeletal: Neck supple  Vascular: No JVD  Cardiovascular:      Rate and Rhythm: Normal rate and regular rhythm  Pulmonary:      Breath sounds: No wheezing or rhonchi  Abdominal:      Palpations: Abdomen is soft  Tenderness: There is no abdominal tenderness  Musculoskeletal:         General: No swelling  Lymphadenopathy:      Cervical: No cervical adenopathy  Skin:     General: Skin is warm and dry  Neurological:      Mental Status: She is alert and oriented to person, place, and time  Psychiatric:         Mood and Affect: Mood normal          Behavior: Behavior normal        Labs: I have personally reviewed pertinent lab results  Lab Results   Component Value Date    WBC 7 14 03/18/2019    HGB 14 3 03/18/2019    HCT 42 8 03/18/2019    MCV 97 03/18/2019     03/18/2019     Lab Results   Component Value Date    K 4 4 03/18/2019       Imaging and other studies: I have personally reviewed pertinent reports     and I have personally reviewed pertinent films in PACS Chest CT 3/20/2019 - benign 2 mm LLL nodule

## 2021-03-11 NOTE — ASSESSMENT & PLAN NOTE
Patient has a 50 pack-year smoking history and quit in 2008  We will schedule for low-dose chest CT for lung cancer screening purposes  I will call her with those results

## 2021-03-31 ENCOUNTER — HOSPITAL ENCOUNTER (OUTPATIENT)
Dept: CT IMAGING | Facility: HOSPITAL | Age: 76
Discharge: HOME | End: 2021-03-31
Attending: INTERNAL MEDICINE
Payer: COMMERCIAL

## 2021-03-31 DIAGNOSIS — Z87.891 HISTORY OF TOBACCO ABUSE: ICD-10-CM

## 2021-03-31 PROCEDURE — 71271 CT THORAX LUNG CANCER SCR C-: CPT

## 2021-04-06 LAB
CREAT ?TM UR-SCNC: 45.1 UMOL/L
EXT PROTEIN URINE: 8.2
PROT/CREAT UR: 0.18 MG/G{CREAT}

## 2021-05-10 ENCOUNTER — OFFICE VISIT (OUTPATIENT)
Dept: NEPHROLOGY | Facility: CLINIC | Age: 76
End: 2021-05-10
Payer: COMMERCIAL

## 2021-05-10 VITALS
SYSTOLIC BLOOD PRESSURE: 110 MMHG | WEIGHT: 186.4 LBS | HEART RATE: 82 BPM | DIASTOLIC BLOOD PRESSURE: 70 MMHG | BODY MASS INDEX: 33.03 KG/M2 | HEIGHT: 63 IN

## 2021-05-10 DIAGNOSIS — N28.1 RENAL CYST, ACQUIRED, RIGHT: ICD-10-CM

## 2021-05-10 DIAGNOSIS — R79.89 LOW SERUM PARATHYROID HORMONE (PTH): ICD-10-CM

## 2021-05-10 DIAGNOSIS — N18.32 STAGE 3B CHRONIC KIDNEY DISEASE (HCC): Primary | ICD-10-CM

## 2021-05-10 DIAGNOSIS — I10 ESSENTIAL HYPERTENSION: ICD-10-CM

## 2021-05-10 PROCEDURE — 99214 OFFICE O/P EST MOD 30 MIN: CPT | Performed by: INTERNAL MEDICINE

## 2021-05-10 RX ORDER — ROSUVASTATIN CALCIUM 10 MG/1
10 TABLET, COATED ORAL DAILY
COMMUNITY

## 2021-05-10 NOTE — PATIENT INSTRUCTIONS
1  chronic kidney disease, stage 3a per CKD EPI equation (eGFR 45ml/min) likely d/t aging kidney +/- hypertension nephrosclerosis  - sCr 1 19 as of 4/6/21  sCr ranges 1 1-1 3, stable since 2016  -UA and urine microscopy shows leukocyte esterase, otherwise bland  -renal ultrasound shows normal kidneys with right renal cyst  -CKD may be due to HTN nephrosclerosis +/- physiologic CKD of aging  -urine protein:Cr 0 18, FLC 1 55 - acceptable for CKD, SPEP/UPEP negative for monoclonal gammopathy  -as no significant proteinuria or hematuria on UA, will defer glomerular work up  -f/u BMP in 3 months and again in 6 months    2  Right renal cyst, acquired - stable as of renal ultrasound as of Feb 2019  Size 1 7-1 8cm  As cyst stable, will defer further imaging for now       3  HTN - BP well controlled for age/CKD  -continue diltiazem XR 240mg daily, hydrochlorothiazide 25mg daily, lisinopril 5mg daily  -Should monitor BP at home  If BP > 140/90 consistently call the nephrology office      4  CKD BMD - Mag/phos acceptable as of Jan 2021  PTH was suppressed in Jan 2021  Repeat PTH level  Continue calcium vitamin D3 combination pill daily       5  elevated total CO2 - bicarb 30 as of 4/6/21  ? D/t HCTZ  Monitor BMP       RTC in 6 months  Obtain bloodwork prior to next office visit in July 2021 and also in Oct  2021

## 2021-05-10 NOTE — PROGRESS NOTES
NEPHROLOGY OUTPATIENT PROGRESS NOTE   Kayden Pate 77 y o  female MRN: [id number]  DATE: 5/10/2021  Reason for visit:   Chief Complaint   Patient presents with    Chronic Kidney Disease    Follow-up        Patient Instructions   1  chronic kidney disease, stage 3a per CKD EPI equation (eGFR 45ml/min) likely d/t aging kidney +/- hypertension nephrosclerosis  - sCr 1 19 as of 4/6/21  sCr ranges 1 1-1 3, stable since 2016  -UA and urine microscopy shows leukocyte esterase, otherwise bland  -renal ultrasound shows normal kidneys with right renal cyst  -CKD may be due to HTN nephrosclerosis +/- physiologic CKD of aging  -urine protein:Cr 0 18, FLC 1 55 - acceptable for CKD, SPEP/UPEP negative for monoclonal gammopathy  -as no significant proteinuria or hematuria on UA, will defer glomerular work up  -f/u BMP in 3 months and again in 6 months    2  Right renal cyst, acquired - stable as of renal ultrasound as of Feb 2019  Size 1 7-1 8cm  As cyst stable, will defer further imaging for now       3  HTN - BP well controlled for age/CKD  -continue diltiazem XR 240mg daily, hydrochlorothiazide 25mg daily, lisinopril 5mg daily  -Should monitor BP at home  If BP > 140/90 consistently call the nephrology office      4  CKD BMD - Mag/phos acceptable as of Jan 2021  PTH was suppressed in Jan 2021  Repeat PTH level  Continue calcium vitamin D3 combination pill daily       5  elevated total CO2 - bicarb 30 as of 4/6/21  ? D/t HCTZ  Monitor BMP       RTC in 6 months  Obtain bloodwork prior to next office visit in July 2021 and also in Oct  2021  Inocencio Aguilera was seen today for chronic kidney disease and follow-up  Diagnoses and all orders for this visit:    Stage 3b chronic kidney disease ([facility] [address] )  -     Basic metabolic panel; Future  -     Magnesium; Future  -     Phosphorus; Future  -     PTH, intact; Future  -     Basic metabolic panel; Future  -     Urinalysis with microscopic;  Future  -     Protein / creatinine ratio, urine; Future    Essential hypertension    Renal cyst, acquired, right    Low serum parathyroid hormone (PTH)  -     PTH, intact; Future        Assessment/Plan:  1  chronic kidney disease, stage 3a per CKD EPI equation (eGFR 45ml/min) likely d/t aging kidney +/- hypertension nephrosclerosis  - sCr 1 19 as of 4/6/21  sCr ranges 1 1-1 3, stable since 2016  -UA and urine microscopy shows leukocyte esterase, otherwise bland  -renal ultrasound shows normal kidneys with right renal cyst  -CKD may be due to HTN nephrosclerosis +/- physiologic CKD of aging  -urine protein:Cr 0 18, FLC 1 55 - acceptable for CKD, SPEP/UPEP negative for monoclonal gammopathy  -as no significant proteinuria or hematuria on UA, will defer glomerular work up  -f/u BMP in 3 months and again in 6 months    2  Right renal cyst, acquired - stable as of renal ultrasound as of Feb 2019  Size 1 7-1 8cm  As cyst stable, will defer further imaging for now       3  HTN - BP well controlled for age/CKD  -continue diltiazem XR 240mg daily, hydrochlorothiazide 25mg daily, lisinopril 5mg daily  -Should monitor BP at home  If BP > 140/90 consistently call the nephrology office      4  CKD BMD - Mag/phos acceptable as of Jan 2021  PTH was suppressed in Jan 2021  Repeat PTH level  Continue calcium vitamin D3 combination pill daily       5  elevated total CO2 - bicarb 30 as of 4/6/21  ? D/t HCTZ  Monitor BMP       RTC in 6 months  Obtain bloodwork prior to next office visit in July 2021 and also in Oct  2021          SUBJECTIVE / INTERVAL HISTORY:  77 y o  female presents in follow up of CKD  Marshal Ibarra denies any recent illness/hospitalizations/medication changes since last office visit  Denies NSAID use  Bp has been well controlled  Review of Systems   Constitutional: Negative for chills and fever  HENT: Negative for sore throat and trouble swallowing  Eyes: Negative for visual disturbance     Respiratory: Negative for cough and shortness of breath  Cardiovascular: Negative for chest pain and leg swelling  Gastrointestinal: Positive for constipation (has to use a stool softener)  Negative for abdominal pain, diarrhea, nausea and vomiting  Endocrine: Negative for polyuria  Genitourinary: Negative for difficulty urinating, dysuria and hematuria  Musculoskeletal: Negative for back pain and neck pain  Skin: Negative for rash  Neurological: Positive for light-headedness (at times)  Negative for dizziness and numbness  Psychiatric/Behavioral: The patient is not nervous/anxious  OBJECTIVE:  /70 (BP Location: Left arm, Patient Position: Sitting, Cuff Size: Extra-Large)   Pulse 82   Ht 5' 3" (1 6 m)   Wt 84 6 kg (186 lb 6 4 oz)   BMI 33 02 kg/m²  Body mass index is 33 02 kg/m²  Physical exam:  Physical Exam  Vitals signs and nursing note reviewed  Constitutional:       General: She is not in acute distress  Appearance: Normal appearance  She is well-developed  She is not diaphoretic  HENT:      Head: Normocephalic and atraumatic  Nose: Nose normal       Mouth/Throat:      Mouth: Mucous membranes are dry  Pharynx: No oropharyngeal exudate  Eyes:      General: No scleral icterus  Right eye: No discharge  Left eye: No discharge  Comments: eyeglasses   Neck:      Musculoskeletal: Normal range of motion and neck supple  Thyroid: No thyromegaly  Cardiovascular:      Rate and Rhythm: Normal rate and regular rhythm  Heart sounds: No murmur  Pulmonary:      Effort: Pulmonary effort is normal  No respiratory distress  Breath sounds: Normal breath sounds  No wheezing  Abdominal:      General: Bowel sounds are normal  There is no distension  Palpations: Abdomen is soft  Musculoskeletal: Normal range of motion  General: No swelling  Skin:     General: Skin is warm and dry  Findings: No rash  Neurological:      General: No focal deficit present  Mental Status: She is alert  Motor: No abnormal muscle tone  Comments: awake   Psychiatric:         Mood and Affect: Mood normal          Behavior: Behavior normal          Medications:    Current Outpatient Medications:     albuterol (2 5 mg/3 mL) 0 083 % nebulizer solution, Take 1 vial (2 5 mg total) by nebulization every 6 (six) hours as needed for wheezing or shortness of breath, Disp: 30 vial, Rfl: 1    albuterol (Ventolin HFA) 90 mcg/act inhaler, Inhale 2 puffs every 6 (six) hours as needed for wheezing, Disp: 18 g, Rfl: 5    Calcium Carbonate-Vitamin D3 600-400 MG-UNIT TABS, Take 1 tablet by mouth daily with dinner , Disp: , Rfl:     Coenzyme Q10 (CO Q 10) 100 MG CAPS, Take 1 capsule by mouth daily with lunch , Disp: , Rfl:     diltiazem (DILT-XR) 240 MG 24 hr capsule, Take 1 capsule by mouth daily with dinner , Disp: , Rfl:     fexofenadine (ALLEGRA) 180 MG tablet, Take 1 tablet by mouth daily in the early morning , Disp: , Rfl:     fluticasone-umeclidinium-vilanterol (Trelegy Ellipta) 100-62 5-25 MCG/INH inhaler, Inhale 1 puff daily, Disp: 1 Inhaler, Rfl: 11    hydrochlorothiazide (HYDRODIURIL) 25 mg tablet, Take 1 tablet by mouth daily in the early morning , Disp: , Rfl:     levothyroxine 112 mcg tablet, Take 112 mcg by mouth daily in the early morning , Disp: , Rfl:     lisinopril (ZESTRIL) 5 mg tablet, Take 1 tablet by mouth daily in the early morning , Disp: , Rfl:     montelukast (SINGULAIR) 10 mg tablet, Take 1 tablet (10 mg total) by mouth daily, Disp: 90 tablet, Rfl: 3    Multiple Vitamins-Minerals (MULTI FOR HER PO), Take 1 tablet by mouth daily with breakfast, Disp: , Rfl:     rosuvastatin (CRESTOR) 10 MG tablet, Take 10 mg by mouth daily, Disp: , Rfl:     SUPER B COMPLEX/C CAPS, Take 1 capsule by mouth daily with dinner , Disp: , Rfl:     lovastatin (MEVACOR) 20 mg tablet, Take 1 tablet by mouth daily with dinner , Disp: , Rfl:     Allergies:   Allergies as of 05/10/2021 - Reviewed 05/10/2021   Allergen Reaction Noted    Pollen extract Allergic Rhinitis and Cough 03/13/2015       The following portions of the patient's history were reviewed and updated as appropriate: past family history, past surgical history and problem list     Laboratory Results:  Lab Results   Component Value Date    K 4 4 03/18/2019        No results found for: PTH, CALCIUM, CAION, PHOS    Portions of the record may have been created with voice recognition software   Occasional wrong word or "sound a like" substitutions may have occurred due to the inherent limitations of voice recognition software   Read the chart carefully and recognize, using context, where substitutions have occurred

## 2021-05-11 ENCOUNTER — OFFICE VISIT (OUTPATIENT)
Dept: SURGICAL ONCOLOGY | Facility: CLINIC | Age: 76
End: 2021-05-11
Payer: COMMERCIAL

## 2021-05-11 VITALS
DIASTOLIC BLOOD PRESSURE: 80 MMHG | RESPIRATION RATE: 12 BRPM | HEIGHT: 63 IN | BODY MASS INDEX: 32.96 KG/M2 | TEMPERATURE: 97.7 F | WEIGHT: 186 LBS | SYSTOLIC BLOOD PRESSURE: 130 MMHG | HEART RATE: 84 BPM

## 2021-05-11 DIAGNOSIS — Z86.000 HISTORY OF LOBULAR CARCINOMA IN SITU (LCIS) OF BREAST: Primary | ICD-10-CM

## 2021-05-11 DIAGNOSIS — C50.912 LOBULAR CARCINOMA OF LEFT BREAST (HCC): ICD-10-CM

## 2021-05-11 DIAGNOSIS — Z12.31 VISIT FOR SCREENING MAMMOGRAM: ICD-10-CM

## 2021-05-11 PROCEDURE — 1036F TOBACCO NON-USER: CPT | Performed by: NURSE PRACTITIONER

## 2021-05-11 PROCEDURE — 99213 OFFICE O/P EST LOW 20 MIN: CPT | Performed by: NURSE PRACTITIONER

## 2021-05-11 PROCEDURE — 1160F RVW MEDS BY RX/DR IN RCRD: CPT | Performed by: NURSE PRACTITIONER

## 2021-05-11 PROCEDURE — 3008F BODY MASS INDEX DOCD: CPT | Performed by: NURSE PRACTITIONER

## 2021-05-11 NOTE — PROGRESS NOTES
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  - 1 year f/u visit    2  Visit for screening mammogram  - Mammo screening bilateral w 3d & cad; Future    3  Lobular carcinoma of left breast [facility])      Discussion/Summary: Patient is a 45-year-old female with a history of LCIS diagnosed in 2012  She underwent a lumpectomy by Dr Riri Gundersoner has no family history of breast cancer  We are following her with annual CBE and annual 3d mammography  She had a bilateral 3D screening mammogram on November 9, 2020 which was BI-RADS 1, category 1 density  She offers no new complaints today and there are no concerns on today's exam   I will plan to see her back in 1 year and order her mammogram for November  She was instructed to call with any new concerns or symptoms prior to that time  All of her questions were answered today  History of Present Illness:     -Interval History:  Patient notices no new breast lumps, skin changes, nipple changes or discharge  Reports no family history of breast cancer  She had a bilateral mammogram in November which was BI-RADS 1  Review of Systems:  Review of Systems   Constitutional: Negative for chills, fatigue and fever  Respiratory: Negative for cough and shortness of breath  Cardiovascular: Negative for chest pain  Hematological: Negative for adenopathy  Psychiatric/Behavioral: Negative for confusion         Patient Active Problem List   Diagnosis    CKD (chronic kidney disease) stage 3, GFR 30-59 ml/min ([facility])    COPD, severe ([address] )    Essential hypertension    Hypercholesteremia    Renal cyst, acquired, right    Squamous cell cancer of skin of buttock    Atypical ductal hyperplasia of breast    Environmental allergies    Hypothyroidism    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    Mixed hyperlipidemia    Nocturia    Sciatica    Spinal stenosis of lumbar region without neurogenic claudication    Increased frequency of urination    Moderate persistent asthma without complication    Lesion of vocal cord    Visit for screening mammogram    [facility]) ([facility] )    Cigarette nicotine dependence in remission     Past Medical History:   Diagnosis Date    Cancer ([facility] )     vocal cord cancer, s/p radiation (2008)    Chronic kidney disease     COPD (chronic obstructive pulmonary disease) ([place] [address] )     Hyperlipidemia     Hypertension     Hypothyroidism     Renal cyst      Past Surgical History:   Procedure Laterality Date    BREAST BIOPSY Left 06/22/2012    in situ    BREAST LUMPECTOMY Left 07/24/2012    in situ    CHOLECYSTECTOMY      COLONOSCOPY      MD LARYNGOSCOPY,DIRECT,SCOPE,INJ CORDS Left 3/29/2019    Procedure: MICROLARYNGOSCOPY AND EXCISION OF LEFT VOCAL CORD LESION;  Surgeon: Kaveh Giles MD;  [facility]: [facility];  Service: ENT    SKIN CANCER EXCISION  07/07/2011    Squamous cell    TONSILLECTOMY       Family History   Problem Relation Age of Onset    Heart disease Mother     Emphysema Mother     Diabetes Mother     Heart disease Father     No Known Problems Daughter     No Known Problems Maternal Grandmother     COPD Maternal Grandfather     No Known Problems Paternal Grandmother     Stroke Paternal Grandfather     No Known Problems Son     No Known Problems Maternal Aunt     No Known Problems Paternal Aunt     No Known Problems Paternal Aunt     No Known Problems Paternal Aunt      Social History     Socioeconomic History    Marital status: /Civil Union     Spouse name: Not on file    Number of children: Not on file    Years of education: Not on file    Highest education level: Not on file   Occupational History    Not on file   Social Needs    Financial resource strain: Not on file    Food insecurity     Worry: Not on file     Inability: Not on file    Transportation needs     Medical: Not on file     Non-medical: Not on file   Tobacco Use    Smoking status: Former Smoker     Packs/day: 1 00     Years: 50 00     Pack years: 50 00     Types: Cigarettes     Quit date: 2008     Years since quittin 3    Smokeless tobacco: Never Used   Substance and Sexual Activity    Alcohol use: Yes     Frequency: 2-3 times a week     Drinks per session: 1 or 2     Binge frequency: Never     Comment: socially    Drug use: No    Sexual activity: Not Currently   Lifestyle    Physical activity     Days per week: Not on file     Minutes per session: Not on file    Stress: Not on file   Relationships    Social connections     Talks on phone: Not on file     Gets together: Not on file     Attends [employer] service: Not on file     Active member of club or organization: Not on file     Attends meetings of clubs or organizations: Not on file     Relationship status: Not on file    Intimate partner violence     Fear of current or ex partner: Not on file     Emotionally abused: Not on file     Physically abused: Not on file     Forced sexual activity: Not on file   Other Topics Concern    Not on file   Social History Narrative    Not on file       Current Outpatient Medications:     albuterol (2 5 mg/3 mL) 0 083 % nebulizer solution, Take 1 vial (2 5 mg total) by nebulization every 6 (six) hours as needed for wheezing or shortness of breath, Disp: 30 vial, Rfl: 1    albuterol (Ventolin HFA) 90 mcg/act inhaler, Inhale 2 puffs every 6 (six) hours as needed for wheezing, Disp: 18 g, Rfl: 5    Calcium Carbonate-Vitamin D3 600-400 MG-UNIT TABS, Take 1 tablet by mouth daily with dinner , Disp: , Rfl:     Coenzyme Q10 (CO Q 10) 100 MG CAPS, Take 1 capsule by mouth daily with lunch , Disp: , Rfl:     diltiazem (DILT-XR) 240 MG 24 hr capsule, Take 1 capsule by mouth daily with dinner , Disp: , Rfl:     fexofenadine (ALLEGRA) 180 MG tablet, Take 1 tablet by mouth daily in the early morning , Disp: , Rfl:     fluticasone-umeclidinium-vilanterol (Trelegy Ellipta) 100-62 5-25 MCG/INH inhaler, Inhale 1 puff daily, Disp: 1 Inhaler, Rfl: 11    hydrochlorothiazide (HYDRODIURIL) 25 mg tablet, Take 1 tablet by mouth daily in the early morning , Disp: , Rfl:     levothyroxine 112 mcg tablet, Take 112 mcg by mouth daily in the early morning , Disp: , Rfl:     lisinopril (ZESTRIL) 5 mg tablet, Take 1 tablet by mouth daily in the early morning , Disp: , Rfl:     montelukast (SINGULAIR) 10 mg tablet, Take 1 tablet (10 mg total) by mouth daily, Disp: 90 tablet, Rfl: 3    Multiple Vitamins-Minerals (MULTI FOR HER PO), Take 1 tablet by mouth daily with breakfast, Disp: , Rfl:     rosuvastatin (CRESTOR) 10 MG tablet, Take 10 mg by mouth daily, Disp: , Rfl:     SUPER B COMPLEX/C CAPS, Take 1 capsule by mouth daily with dinner , Disp: , Rfl:     lovastatin (MEVACOR) 20 mg tablet, Take 1 tablet by mouth daily with dinner , Disp: , Rfl:   Allergies   Allergen Reactions    Pollen Extract Allergic Rhinitis and Cough     Vitals:    05/11/21 0956   BP: 130/80   Pulse: 84   Resp: 12   Temp: 97 7 °F (36 5 °C)       Physical Exam  Constitutional:       Appearance: Normal appearance  HENT:      Head: Normocephalic and atraumatic  Pulmonary:      Effort: Pulmonary effort is normal    Chest:      Breasts:         Right: No swelling, bleeding, inverted nipple, mass, nipple discharge, skin change or tenderness  Left: Skin change (Surgical scar) present  No swelling, bleeding, inverted nipple, mass, nipple discharge or tenderness  Lymphadenopathy:      Upper Body:      Right upper body: No supraclavicular or axillary adenopathy  Left upper body: No supraclavicular or axillary adenopathy  Neurological:      General: No focal deficit present  Mental Status: She is alert and oriented to person, place, and time  Psychiatric:         Mood and Affect: Mood normal        Advance Care Planning/Advance Directives:  Discussed disease status and treatment goals with the patient

## 2021-06-16 ENCOUNTER — TELEPHONE (OUTPATIENT)
Dept: PULMONOLOGY | Facility: CLINIC | Age: 76
End: 2021-06-16

## 2021-06-16 DIAGNOSIS — B37.0 THRUSH, ORAL: ICD-10-CM

## 2021-06-16 DIAGNOSIS — J44.1 COPD EXACERBATION (HCC): Primary | ICD-10-CM

## 2021-06-16 RX ORDER — PREDNISONE 20 MG/1
40 TABLET ORAL DAILY
Qty: 10 TABLET | Refills: 0 | Status: SHIPPED | OUTPATIENT
Start: 2021-06-16 | End: 2021-06-21

## 2021-06-16 NOTE — TELEPHONE ENCOUNTER
Please let patient know that I have called in a course of prednisone and nystatin  If she does not feel better in 24-48 hours, she should call the office and we should get her in for a sick visit

## 2021-06-16 NOTE — TELEPHONE ENCOUNTER
Spoke with [employer]  She said she has been not feeling well since 6/3  She said the evenings are the worse but today is the first day she woke up and does not feel well during the day  She has a cough with white mucus  She said it feels "clogged in the bronchial tubes like there is a plug"  She has some chest tightness, wheezing and increased SOB  She also says she is starting with a headache  Denies fevers and chills  She also thinks she has thrush because her tongue is now white  She is taking Trelegy and rinses after  She has been using her nebulizer at   She does sleep fine through the night  Please advise

## 2021-06-21 ENCOUNTER — TELEPHONE (OUTPATIENT)
Dept: PULMONOLOGY | Facility: CLINIC | Age: 76
End: 2021-06-21

## 2021-06-21 NOTE — TELEPHONE ENCOUNTER
Pt called stating that she wanted Dr. Martin to know that last night about 10 pm  The last part of the congestion broke off and she wants to know if the doctor would like her to do anything else Pls. Give her a call back.

## 2021-06-21 NOTE — TELEPHONE ENCOUNTER
Attempted to call patient to discuss. No answer. If she returns call, she can be offered sick visit with Margot tomorrow - virtual or in-office.

## 2021-08-03 NOTE — TELEPHONE ENCOUNTER
Patient called again stating she was feeling congested again. I scheduled her a sick visit for tomorrow

## 2021-08-04 ENCOUNTER — OFFICE VISIT (OUTPATIENT)
Dept: PULMONOLOGY | Facility: CLINIC | Age: 76
End: 2021-08-04
Payer: COMMERCIAL

## 2021-08-04 VITALS
DIASTOLIC BLOOD PRESSURE: 80 MMHG | SYSTOLIC BLOOD PRESSURE: 140 MMHG | BODY MASS INDEX: 32.82 KG/M2 | HEART RATE: 80 BPM | HEIGHT: 63 IN | RESPIRATION RATE: 22 BRPM | OXYGEN SATURATION: 96 % | WEIGHT: 185.2 LBS | TEMPERATURE: 98.8 F

## 2021-08-04 DIAGNOSIS — J44.9 COPD, SEVERE (HCC): Primary | ICD-10-CM

## 2021-08-04 PROCEDURE — 1160F RVW MEDS BY RX/DR IN RCRD: CPT | Performed by: INTERNAL MEDICINE

## 2021-08-04 PROCEDURE — 99213 OFFICE O/P EST LOW 20 MIN: CPT | Performed by: INTERNAL MEDICINE

## 2021-08-04 PROCEDURE — 1036F TOBACCO NON-USER: CPT | Performed by: INTERNAL MEDICINE

## 2021-08-04 PROCEDURE — 3008F BODY MASS INDEX DOCD: CPT | Performed by: INTERNAL MEDICINE

## 2021-08-04 RX ORDER — FLUTICASONE FUROATE, UMECLIDINIUM BROMIDE AND VILANTEROL TRIFENATATE 200; 62.5; 25 UG/1; UG/1; UG/1
1 POWDER RESPIRATORY (INHALATION) DAILY
Qty: 180 BLISTER | Refills: 0 | Status: SHIPPED | OUTPATIENT
Start: 2021-08-04 | End: 2021-11-04 | Stop reason: SDUPTHER

## 2021-08-04 RX ORDER — PREDNISONE 20 MG/1
40 TABLET ORAL DAILY
Qty: 10 TABLET | Refills: 0 | Status: SHIPPED | OUTPATIENT
Start: 2021-08-04 | End: 2021-08-09

## 2021-08-04 RX ORDER — DILTIAZEM HYDROCHLORIDE 240 MG/1
240 CAPSULE, COATED, EXTENDED RELEASE ORAL DAILY
COMMUNITY
Start: 2021-06-07 | End: 2022-02-07 | Stop reason: SDUPTHER

## 2021-08-04 NOTE — PROGRESS NOTES
Pulmonary Follow Up Note   Magdy Mcmillan 77 y o  female MRN: [id number]  8/3/2021      Assessment:  1  COPD/asthma exacerbation  2  Severe COPD, GOLD group D  3  Severe persistent asthma  4  Obesity  5  Prior history of thrush    Plan:    - Will prescribe 5 days of prednisone 40mg  Hesitant to give prolonged course as she has had prednisone frequently in the past  - Increasing dose of home trelegy as she has asthma as well  - Educated on proper rinsing technique after inhaler use  - Follow up LD lung cancer screening CT in March of next year, will order next visit  - Patient prefers not to schedule a visit for follow up at this time        History of Present Illness   HPI:  Magdy Mcmillan is a 77 y o  female with history of obesity, ENT cancer 2008 s/p radiation, severe COPD  as well as moderate persistent asthma on Trelegy Ellipta, albuterol p r n , Singulair and Mucinex with a 50 pack-year smoking history having quit in 2000  She now presents for follow-up visit for worsening SOB and productive cough  Recently received a course of oral prednisone and nystatin starting on 06/16/2021 for similar symptoms  That course dramatically improved her respiratory symptoms  About a week ago she again began to feel increasing shortness of breath and started having increased sputum production with white sputum  She denies feeling febrile or ill  Historically,   CT in March of 2021 demonstrated a 2 mm left lower lobe pulmonary nodule stable from March of 2019 03/29/2019 vocal cord biopsy demonstrated:   A  Left vocal fold lesion (biopsy):  - Septate hyphal elements identified (see comment)  - Acutely and chronically inflamed squamous mucosa with spongiosis, reactive changes  - Focal surface erosion  - Negative for dysplasia         Review of Systems   Constitutional: Negative for appetite change, chills, fever and unexpected weight change  HENT: Negative for ear pain and sore throat      Eyes: Negative for pain and visual disturbance  Respiratory: Positive for cough, chest tightness, shortness of breath and wheezing  Cardiovascular: Negative for chest pain and palpitations  Gastrointestinal: Negative for abdominal pain and vomiting  Genitourinary: Negative for dysuria and hematuria  Musculoskeletal: Negative for arthralgias and back pain  Skin: Negative for color change and rash  Neurological: Negative for seizures and syncope  All other systems reviewed and are negative        Historical Information   Past Medical History:   Diagnosis Date    Cancer [facility])     vocal cord cancer, s/p radiation ()    Chronic kidney disease     COPD [facility]chronic obstructive pulmonary disease) ([facility] [address] )     Hyperlipidemia     Hypertension     Hypothyroidism     Renal cyst      Past Surgical History:   Procedure Laterality Date    BREAST BIOPSY Left 2012    in situ    BREAST LUMPECTOMY Left 2012    in situ    CHOLECYSTECTOMY      COLONOSCOPY      MO LARYNGOSCOPY,DIRECT,SCOPE,INJ CORDS Left 3/29/2019    Procedure: MICROLARYNGOSCOPY AND EXCISION OF LEFT VOCAL CORD LESION;  Surgeon: Verona Barrientos MD;  [facility];  Service: ENT    SKIN CANCER EXCISION  2011    Squamous cell    TONSILLECTOMY       Family History   Problem Relation Age of Onset    Heart disease Mother     Emphysema Mother     Diabetes Mother     Heart disease Father     No Known Problems Daughter     No Known Problems Maternal Grandmother     COPD Maternal Grandfather     No Known Problems Paternal Grandmother     Stroke Paternal Grandfather     No Known Problems Son     No Known Problems Maternal Aunt     No Known Problems Paternal Aunt     No Known Problems Paternal Aunt     No Known Problems Paternal Aunt      Social History     Tobacco Use   Smoking Status Former Smoker    Packs/day: 1 00    Years: 50 00    Pack years: 50 00    Types: Cigarettes    Quit date: 2008    Years since quittin 5   Smokeless Tobacco Never Used       Meds/Allergies     Current Outpatient Medications:     albuterol (2 5 mg/3 mL) 0 083 % nebulizer solution, Take 1 vial (2 5 mg total) by nebulization every 6 (six) hours as needed for wheezing or shortness of breath, Disp: 30 vial, Rfl: 1    albuterol (Ventolin HFA) 90 mcg/act inhaler, Inhale 2 puffs every 6 (six) hours as needed for wheezing, Disp: 18 g, Rfl: 5    Calcium Carbonate-Vitamin D3 600-400 MG-UNIT TABS, Take 1 tablet by mouth daily with dinner , Disp: , Rfl:     Coenzyme Q10 (CO Q 10) 100 MG CAPS, Take 1 capsule by mouth daily with lunch , Disp: , Rfl:     diltiazem (DILT-XR) 240 MG 24 hr capsule, Take 1 capsule by mouth daily with dinner , Disp: , Rfl:     fexofenadine (ALLEGRA) 180 MG tablet, Take 1 tablet by mouth daily in the early morning , Disp: , Rfl:     fluticasone-umeclidinium-vilanterol (Trelegy Ellipta) 100-62 5-25 MCG/INH inhaler, Inhale 1 puff daily, Disp: 1 Inhaler, Rfl: 11    hydrochlorothiazide (HYDRODIURIL) 25 mg tablet, Take 1 tablet by mouth daily in the early morning , Disp: , Rfl:     levothyroxine 112 mcg tablet, Take 112 mcg by mouth daily in the early morning , Disp: , Rfl:     lisinopril (ZESTRIL) 5 mg tablet, Take 1 tablet by mouth daily in the early morning , Disp: , Rfl:     lovastatin (MEVACOR) 20 mg tablet, Take 1 tablet by mouth daily with dinner , Disp: , Rfl:     montelukast (SINGULAIR) 10 mg tablet, Take 1 tablet (10 mg total) by mouth daily, Disp: 90 tablet, Rfl: 3    Multiple Vitamins-Minerals (MULTI FOR HER PO), Take 1 tablet by mouth daily with breakfast, Disp: , Rfl:     nystatin (MYCOSTATIN) 500,000 units/5 mL suspension, Apply 5 mL (500,000 Units total) to the mouth or throat 4 (four) times a day, Disp: 200 mL, Rfl: 0    rosuvastatin (CRESTOR) 10 MG tablet, Take 10 mg by mouth daily, Disp: , Rfl:     SUPER B COMPLEX/C CAPS, Take 1 capsule by mouth daily with dinner , Disp: , Rfl:   Allergies   Allergen Reactions    Pollen Extract Allergic Rhinitis and Cough       Vitals: There were no vitals taken for this visit  There is no height or weight on file to calculate BMI  Physical Exam  Physical Exam  Vitals and nursing note reviewed  Constitutional:       Appearance: Normal appearance  HENT:      Head: Normocephalic and atraumatic  Mouth/Throat:      Pharynx: No oropharyngeal exudate or posterior oropharyngeal erythema  Eyes:      General: No scleral icterus  Conjunctiva/sclera: Conjunctivae normal    Cardiovascular:      Rate and Rhythm: Normal rate and regular rhythm  Heart sounds: No murmur heard  No friction rub  No gallop  Pulmonary:      Effort: Pulmonary effort is normal       Breath sounds: Wheezing and rhonchi present  No rales  Abdominal:      General: Bowel sounds are normal  There is no distension  Palpations: Abdomen is soft  Tenderness: There is no abdominal tenderness  There is no guarding  Musculoskeletal:      Cervical back: No tenderness  Right lower leg: No edema  Left lower leg: No edema  Lymphadenopathy:      Cervical: No cervical adenopathy  Skin:     General: Skin is warm and dry  Neurological:      General: No focal deficit present  Mental Status: She is alert and oriented to person, place, and time  Labs: I have personally reviewed pertinent lab results  Lab Results   Component Value Date    WBC 7 14 03/18/2019    HGB 14 3 03/18/2019    HCT 42 8 03/18/2019    MCV 97 03/18/2019     03/18/2019     Lab Results   Component Value Date    K 4 4 03/18/2019     Preventive   Influenza vaccine: Obtains yearly  COVID-19 vaccination: Completed [address] vaccination in February  Pneumonia vaccine: Up to date  Lung Cancer screening:  CT chest LD 3/31/21:  LUNGS:  Stable 2 mm left lower lobe pulmonary nodule (series 3, image 94)    No tracheal or endobronchial lesion      PLEURA:  Unremarkable      HEART/GREAT VESSELS:  Coronary artery calcifications      MEDIASTINUM AND ROE:  Unremarkable      CHEST WALL AND LOWER NECK:   Unremarkable      VISUALIZED STRUCTURES IN THE UPPER ABDOMEN:  Stable hepatic cyst   Status post cholecystectomy      OSSEOUS STRUCTURES:  No acute fracture or destructive osseous lesion  Chronic compression deformity of the T12 vertebral body with stable moderate vertebral body height loss      IMPRESSION:     Stable 2 mm left lower lobe pulmonary nodule        Lung-RADS2, benign appearance or behavior  Continue annual screening with LDCT in 12 months       Pulmonary function testing:  3/22/18: Patient demonstrated good effort cooperation   FEV1/FVC ratio is 46% with an FEV1 of 47% of predicted and FVC of 77% of predicted   This study demonstrates severe obstruction  Disclaimer: Portions of the record may have been created with voice recognition software  Occasional wrong word or "sound a like" substitutions may have occurred due to the inherent limitations of voice recognition software  Careful consideration should be taken to recognize, using context, where substitutions have occurred      Morales Teresa DO   Pulmonary & Critical Care Medicine Fellow PGY-IV  [facility]

## 2021-09-14 DIAGNOSIS — J44.9 CHRONIC OBSTRUCTIVE PULMONARY DISEASE, UNSPECIFIED COPD TYPE (HCC): ICD-10-CM

## 2021-09-14 RX ORDER — ALBUTEROL SULFATE 90 UG/1
2 AEROSOL, METERED RESPIRATORY (INHALATION) EVERY 6 HOURS PRN
Qty: 18 G | Refills: 5 | Status: SHIPPED | OUTPATIENT
Start: 2021-09-14

## 2021-09-14 RX ORDER — ALBUTEROL SULFATE 2.5 MG/3ML
2.5 SOLUTION RESPIRATORY (INHALATION) EVERY 6 HOURS PRN
Qty: 360 ML | Refills: 1 | Status: SHIPPED | OUTPATIENT
Start: 2021-09-14

## 2021-09-27 ENCOUNTER — TELEPHONE (OUTPATIENT)
Dept: NEPHROLOGY | Facility: CLINIC | Age: 76
End: 2021-09-27

## 2021-10-06 ENCOUNTER — TELEPHONE (OUTPATIENT)
Dept: NEPHROLOGY | Facility: HOSPITAL | Age: 76
End: 2021-10-06

## 2021-10-07 ENCOUNTER — TELEPHONE (OUTPATIENT)
Dept: PULMONOLOGY | Facility: CLINIC | Age: 76
End: 2021-10-07

## 2021-10-08 ENCOUNTER — OFFICE VISIT (OUTPATIENT)
Dept: PULMONOLOGY | Facility: CLINIC | Age: 76
End: 2021-10-08
Payer: COMMERCIAL

## 2021-10-08 VITALS
OXYGEN SATURATION: 98 % | HEIGHT: 63 IN | DIASTOLIC BLOOD PRESSURE: 68 MMHG | HEART RATE: 75 BPM | WEIGHT: 182.6 LBS | SYSTOLIC BLOOD PRESSURE: 118 MMHG | TEMPERATURE: 98.7 F | BODY MASS INDEX: 32.36 KG/M2

## 2021-10-08 DIAGNOSIS — J44.9 COPD, SEVERE (HCC): Primary | ICD-10-CM

## 2021-10-08 DIAGNOSIS — R05.3 CHRONIC COUGH: ICD-10-CM

## 2021-10-08 DIAGNOSIS — J45.40 MODERATE PERSISTENT ASTHMA WITHOUT COMPLICATION: ICD-10-CM

## 2021-10-08 PROBLEM — F17.211 CIGARETTE NICOTINE DEPENDENCE IN REMISSION: Status: RESOLVED | Noted: 2020-03-03 | Resolved: 2021-10-08

## 2021-10-08 PROCEDURE — 99215 OFFICE O/P EST HI 40 MIN: CPT | Performed by: INTERNAL MEDICINE

## 2021-10-08 RX ORDER — GUAIFENESIN 600 MG
1200 TABLET, EXTENDED RELEASE 12 HR ORAL DAILY
COMMUNITY
End: 2021-10-08

## 2021-10-08 RX ORDER — FLUTICASONE PROPIONATE 50 MCG
1 SPRAY, SUSPENSION (ML) NASAL 2 TIMES DAILY
Qty: 18.2 ML | Refills: 6 | Status: SHIPPED | OUTPATIENT
Start: 2021-10-08 | End: 2022-03-31

## 2021-10-08 RX ORDER — BENZONATATE 100 MG/1
100 CAPSULE ORAL 3 TIMES DAILY PRN
Qty: 30 CAPSULE | Refills: 1 | Status: SHIPPED | OUTPATIENT
Start: 2021-10-08 | End: 2021-11-04 | Stop reason: ALTCHOICE

## 2021-10-09 ENCOUNTER — IMMUNIZATIONS (OUTPATIENT)
Dept: FAMILY MEDICINE CLINIC | Facility: HOSPITAL | Age: 76
End: 2021-10-09

## 2021-10-09 DIAGNOSIS — Z23 ENCOUNTER FOR IMMUNIZATION: Primary | ICD-10-CM

## 2021-10-09 PROCEDURE — 91300 SARS-COV-2 / COVID-19 MRNA VACCINE (PFIZER-BIONTECH) 30 MCG: CPT

## 2021-10-09 PROCEDURE — 0001A SARS-COV-2 / COVID-19 MRNA VACCINE (PFIZER-BIONTECH) 30 MCG: CPT

## 2021-10-12 ENCOUNTER — APPOINTMENT (OUTPATIENT)
Dept: LAB | Facility: CLINIC | Age: 76
End: 2021-10-12
Payer: COMMERCIAL

## 2021-10-12 DIAGNOSIS — J45.40 MODERATE PERSISTENT ASTHMA WITHOUT COMPLICATION: ICD-10-CM

## 2021-10-12 DIAGNOSIS — J44.9 COPD, SEVERE (HCC): ICD-10-CM

## 2021-10-12 PROCEDURE — 87205 SMEAR GRAM STAIN: CPT

## 2021-10-12 PROCEDURE — 87070 CULTURE OTHR SPECIMN AEROBIC: CPT

## 2021-10-14 LAB
BACTERIA SPT RESP CULT: ABNORMAL
GRAM STN SPEC: ABNORMAL

## 2021-10-18 ENCOUNTER — HOSPITAL ENCOUNTER (OUTPATIENT)
Dept: RADIOLOGY | Age: 76
Discharge: HOME | End: 2021-10-18
Attending: INTERNAL MEDICINE
Payer: COMMERCIAL

## 2021-10-18 DIAGNOSIS — J45.40 MODERATE PERSISTENT ASTHMA WITHOUT COMPLICATION: ICD-10-CM

## 2021-10-18 DIAGNOSIS — J44.9 COPD, SEVERE (HCC): ICD-10-CM

## 2021-10-18 PROCEDURE — 71250 CT THORAX DX C-: CPT

## 2021-10-18 PROCEDURE — G1004 CDSM NDSC: HCPCS

## 2021-10-20 ENCOUNTER — HOSPITAL ENCOUNTER (OUTPATIENT)
Dept: PULMONOLOGY | Facility: HOSPITAL | Age: 76
Discharge: HOME | End: 2021-10-20
Attending: INTERNAL MEDICINE
Payer: COMMERCIAL

## 2021-10-20 DIAGNOSIS — J44.9 COPD, SEVERE (HCC): ICD-10-CM

## 2021-10-20 DIAGNOSIS — J45.40 MODERATE PERSISTENT ASTHMA WITHOUT COMPLICATION: ICD-10-CM

## 2021-10-20 PROCEDURE — 94726 PLETHYSMOGRAPHY LUNG VOLUMES: CPT | Performed by: INTERNAL MEDICINE

## 2021-10-20 PROCEDURE — 94760 N-INVAS EAR/PLS OXIMETRY 1: CPT

## 2021-10-20 PROCEDURE — 94729 DIFFUSING CAPACITY: CPT | Performed by: INTERNAL MEDICINE

## 2021-10-20 PROCEDURE — 94060 EVALUATION OF WHEEZING: CPT | Performed by: INTERNAL MEDICINE

## 2021-10-20 PROCEDURE — 94729 DIFFUSING CAPACITY: CPT

## 2021-10-20 PROCEDURE — 94060 EVALUATION OF WHEEZING: CPT

## 2021-10-20 PROCEDURE — 94726 PLETHYSMOGRAPHY LUNG VOLUMES: CPT

## 2021-10-20 RX ORDER — ALBUTEROL SULFATE 2.5 MG/3ML
2.5 SOLUTION RESPIRATORY (INHALATION) ONCE
Status: COMPLETED | OUTPATIENT
Start: 2021-10-20 | End: 2021-10-20

## 2021-10-20 RX ADMIN — ALBUTEROL SULFATE 2.5 MG: 2.5 SOLUTION RESPIRATORY (INHALATION) at 15:16

## 2021-11-04 ENCOUNTER — OFFICE VISIT (OUTPATIENT)
Dept: PULMONOLOGY | Facility: CLINIC | Age: 76
End: 2021-11-04
Payer: COMMERCIAL

## 2021-11-04 VITALS
DIASTOLIC BLOOD PRESSURE: 74 MMHG | RESPIRATION RATE: 18 BRPM | BODY MASS INDEX: 32.43 KG/M2 | OXYGEN SATURATION: 98 % | HEART RATE: 78 BPM | HEIGHT: 63 IN | SYSTOLIC BLOOD PRESSURE: 118 MMHG | TEMPERATURE: 97 F | WEIGHT: 183 LBS

## 2021-11-04 DIAGNOSIS — J44.9 COPD, SEVERE (HCC): ICD-10-CM

## 2021-11-04 DIAGNOSIS — Z23 NEED FOR INFLUENZA VACCINATION: Primary | ICD-10-CM

## 2021-11-04 PROBLEM — J30.1 NON-SEASONAL ALLERGIC RHINITIS DUE TO POLLEN: Status: ACTIVE | Noted: 2021-11-04

## 2021-11-04 PROCEDURE — G0008 ADMIN INFLUENZA VIRUS VAC: HCPCS

## 2021-11-04 PROCEDURE — 90662 IIV NO PRSV INCREASED AG IM: CPT

## 2021-11-04 PROCEDURE — 99214 OFFICE O/P EST MOD 30 MIN: CPT | Performed by: INTERNAL MEDICINE

## 2021-11-04 RX ORDER — FLUTICASONE FUROATE, UMECLIDINIUM BROMIDE AND VILANTEROL TRIFENATATE 200; 62.5; 25 UG/1; UG/1; UG/1
1 POWDER RESPIRATORY (INHALATION) DAILY
Qty: 60 BLISTER | Refills: 5 | Status: SHIPPED | OUTPATIENT
Start: 2021-11-04 | End: 2022-02-07 | Stop reason: SDUPTHER

## 2021-11-24 ENCOUNTER — HOSPITAL ENCOUNTER (OUTPATIENT)
Dept: RADIOLOGY | Age: 76
Discharge: HOME | End: 2021-11-24
Payer: COMMERCIAL

## 2021-11-24 VITALS — HEIGHT: 63 IN | BODY MASS INDEX: 32.43 KG/M2 | WEIGHT: 183 LBS

## 2021-11-24 DIAGNOSIS — Z12.31 VISIT FOR SCREENING MAMMOGRAM: ICD-10-CM

## 2021-11-24 DIAGNOSIS — Z12.31 ENCOUNTER FOR SCREENING MAMMOGRAM FOR MALIGNANT NEOPLASM OF BREAST: ICD-10-CM

## 2021-11-24 PROCEDURE — 77067 SCR MAMMO BI INCL CAD: CPT

## 2021-11-24 PROCEDURE — 77063 BREAST TOMOSYNTHESIS BI: CPT

## 2021-12-14 ENCOUNTER — HOSPITAL ENCOUNTER (OUTPATIENT)
Dept: MAMMOGRAPHY | Facility: CLINIC | Age: 76
Discharge: HOME | End: 2021-12-14
Payer: COMMERCIAL

## 2021-12-14 VITALS — BODY MASS INDEX: 32.43 KG/M2 | WEIGHT: 183 LBS | HEIGHT: 63 IN

## 2021-12-14 DIAGNOSIS — R92.8 ABNORMAL SCREENING MAMMOGRAM: ICD-10-CM

## 2021-12-14 PROCEDURE — 77065 DX MAMMO INCL CAD UNI: CPT

## 2021-12-14 NOTE — PROGRESS NOTES
Met with patient and Dr iMchelle Yu  regarding recommendation for;    ___x__ RIGHT ______LEFT      _____Ultrasound guided  __x____Stereotactic breast biopsy  __X___Verbalized understanding        Blood thinners:  No: _x____ Yes: ______ What:                 Biopsy teaching sheet given:  Yes: ___X___ No: ________    Pt given contact information and adv to call with any questions/needs

## 2022-01-04 ENCOUNTER — TELEPHONE (OUTPATIENT)
Dept: NEPHROLOGY | Facility: CLINIC | Age: 77
End: 2022-01-04

## 2022-01-04 DIAGNOSIS — N18.32 STAGE 3B CHRONIC KIDNEY DISEASE (HCC): Primary | ICD-10-CM

## 2022-01-04 NOTE — TELEPHONE ENCOUNTER
Spoke with patient reminding her to go for lab work  Mailed scripts to her since she goes to [facility] for labs

## 2022-01-10 ENCOUNTER — OFFICE VISIT (OUTPATIENT)
Dept: NEPHROLOGY | Facility: CLINIC | Age: 77
End: 2022-01-10
Payer: COMMERCIAL

## 2022-01-10 VITALS
BODY MASS INDEX: 33.49 KG/M2 | HEART RATE: 79 BPM | SYSTOLIC BLOOD PRESSURE: 124 MMHG | WEIGHT: 189 LBS | HEIGHT: 63 IN | DIASTOLIC BLOOD PRESSURE: 75 MMHG

## 2022-01-10 DIAGNOSIS — N28.1 RENAL CYST, ACQUIRED, RIGHT: ICD-10-CM

## 2022-01-10 DIAGNOSIS — N18.32 STAGE 3B CHRONIC KIDNEY DISEASE (HCC): Primary | ICD-10-CM

## 2022-01-10 DIAGNOSIS — I10 ESSENTIAL HYPERTENSION: ICD-10-CM

## 2022-01-10 PROCEDURE — 99214 OFFICE O/P EST MOD 30 MIN: CPT | Performed by: INTERNAL MEDICINE

## 2022-01-10 RX ORDER — GUAIFENESIN 600 MG
1200 TABLET, EXTENDED RELEASE 12 HR ORAL EVERY 12 HOURS SCHEDULED
COMMUNITY

## 2022-01-10 NOTE — PATIENT INSTRUCTIONS
1  chronic kidney disease, stage 3a per CKD EPI equation (eGFR 45ml/min) likely d/t aging kidney +/- hypertension nephrosclerosis  - sCr 1 12 as of 10/28/21  sCr ranges 1 1-1 3, stable since 2016  -UA and urine microscopy shows leukocyte esterase, otherwise bland  -renal ultrasound shows normal kidneys with right renal cyst  -CKD may be due to HTN nephrosclerosis +/- physiologic CKD of aging  -urine protein:Cr 0 12, FLC 1 55 - acceptable for CKD, SPEP/UPEP negative for monoclonal gammopathy  -as no significant proteinuria or hematuria on UA, will defer glomerular work up  -f/u BMP now and again in 6 months    2  Right renal cyst, acquired - stable as of renal ultrasound as of Feb 2019  Size 1 7-1 8cm  As cyst stable, will defer further imaging for now       3  HTN - BP well controlled for age/CKD  -continue diltiazem XR 240mg daily, hydrochlorothiazide 25mg daily, lisinopril 5mg daily  -Should monitor BP at home  If BP > 140/90 consistently call the nephrology office  4  CKD BMD - Mag/phos acceptable as of June 2021  PTH was 67 in June 2021  Continue calcium vitamin D3 combination pill daily       5  elevated total CO2 - bicarb 33 as of 10/28/21  ? D/t HCTZ  Monitor BMP       RTC in 12 months  Obtain bloodwork and urine testing in 6 months

## 2022-01-10 NOTE — PROGRESS NOTES
NEPHROLOGY OUTPATIENT PROGRESS NOTE   Cynthia Patel 77 y o  female MRN: [id number]  DATE: 1/10/2022  Reason for visit:   Chief Complaint   Patient presents with    Chronic Kidney Disease    Follow-up        Patient Instructions   1  chronic kidney disease, stage 3a per CKD EPI equation (eGFR 45ml/min) likely d/t aging kidney +/- hypertension nephrosclerosis  - sCr 1 12 as of 10/28/21  sCr ranges 1 1-1 3, stable since 2016  -UA and urine microscopy shows leukocyte esterase, otherwise bland  -renal ultrasound shows normal kidneys with right renal cyst  -CKD may be due to HTN nephrosclerosis +/- physiologic CKD of aging  -urine protein:Cr 0 12, FLC 1 55 - acceptable for CKD, SPEP/UPEP negative for monoclonal gammopathy  -as no significant proteinuria or hematuria on UA, will defer glomerular work up  -f/u BMP now and again in 6 months    2  Right renal cyst, acquired - stable as of renal ultrasound as of Feb 2019  Size 1 7-1 8cm  As cyst stable, will defer further imaging for now       3  HTN - BP well controlled for age/CKD  -continue diltiazem XR 240mg daily, hydrochlorothiazide 25mg daily, lisinopril 5mg daily  -Should monitor BP at home  If BP > 140/90 consistently call the nephrology office  4  CKD BMD - Mag/phos acceptable as of June 2021  PTH was 67 in June 2021  Continue calcium vitamin D3 combination pill daily       5  elevated total CO2 - bicarb 33 as of 10/28/21  ? D/t HCTZ  Monitor BMP       RTC in 12 months  Obtain bloodwork and urine testing in 6 months  Lenny Newberry was seen today for chronic kidney disease and follow-up  Diagnoses and all orders for this visit:    Stage 3b chronic kidney disease ([address] )  -     Basic metabolic panel; Future  -     Urinalysis with microscopic; Future  -     Protein / creatinine ratio, urine; Future  -     Magnesium; Future  -     Phosphorus; Future  -     PTH, intact; Future    Essential hypertension    Renal cyst, acquired, right        Assessment/Plan:  1  chronic kidney disease, stage 3a per CKD EPI equation (eGFR 45ml/min) likely d/t aging kidney +/- hypertension nephrosclerosis  - sCr 1 12 as of 10/28/21  sCr ranges 1 1-1 3, stable since 2016  -UA and urine microscopy shows leukocyte esterase, otherwise bland  -renal ultrasound shows normal kidneys with right renal cyst  -CKD may be due to HTN nephrosclerosis +/- physiologic CKD of aging  -urine protein:Cr 0 12, FLC 1 55 - acceptable for CKD, SPEP/UPEP negative for monoclonal gammopathy  -as no significant proteinuria or hematuria on UA, will defer glomerular work up  -f/u BMP now and again in 6 months    2  Right renal cyst, acquired - stable as of renal ultrasound as of Feb 2019  Size 1 7-1 8cm  As cyst stable, will defer further imaging for now       3  HTN - BP well controlled for age/CKD  -continue diltiazem XR 240mg daily, hydrochlorothiazide 25mg daily, lisinopril 5mg daily  -Should monitor BP at home  If BP > 140/90 consistently call the nephrology office  4  CKD BMD - Mag/phos acceptable as of June 2021  PTH was 67 in June 2021  Continue calcium vitamin D3 combination pill daily       5  elevated total CO2 - bicarb 33 as of 10/28/21  ? D/t HCTZ  Monitor BMP       RTC in 12 months  Obtain bloodwork and urine testing in 6 months  SUBJECTIVE / INTERVAL HISTORY:  77 y o  female presents in follow up of CKD  Terrence Kimble denies any recent illness/hospitalizations/medication changes since last office visit  Denies NSAID use  HTN - BP at home has been well controlled  She did not get a chance to get bloodwork yet  Had COPD issues over the summer but it is better now  Tries to increase hydration  Review of Systems   Constitutional: Negative for chills and fever  HENT: Negative for sore throat and trouble swallowing  Eyes: Negative for visual disturbance  Respiratory: Positive for shortness of breath (chronic)  Negative for cough      Cardiovascular: Negative for chest pain and leg swelling  Gastrointestinal: Negative for abdominal pain, constipation, diarrhea, nausea and vomiting  Endocrine: Negative for polyuria  Genitourinary: Negative for difficulty urinating, dysuria and hematuria  Musculoskeletal: Negative for back pain and neck pain  Skin: Negative for rash  Neurological: Positive for dizziness (occasional)  Negative for light-headedness and numbness  Psychiatric/Behavioral: The patient is not nervous/anxious  OBJECTIVE:  /75 (BP Location: Right arm, Patient Position: Sitting, Cuff Size: Standard)   Pulse 79   Ht 5' 3" (1 6 m)   Wt 85 7 kg (189 lb)   BMI 33 48 kg/m²  Body mass index is 33 48 kg/m²  Physical exam:  Physical Exam  Vitals and nursing note reviewed  Constitutional:       General: She is not in acute distress  Appearance: Normal appearance  She is well-developed  She is not diaphoretic  HENT:      Head: Normocephalic and atraumatic  Nose: Nose normal       Mouth/Throat:      Mouth: Mucous membranes are dry  Pharynx: No oropharyngeal exudate  Eyes:      General: No scleral icterus  Right eye: No discharge  Left eye: No discharge  Comments: eyeglasses   Neck:      Thyroid: No thyromegaly  Cardiovascular:      Rate and Rhythm: Normal rate and regular rhythm  Heart sounds: No murmur heard  Pulmonary:      Effort: Pulmonary effort is normal  No respiratory distress  Breath sounds: Normal breath sounds  No wheezing  Abdominal:      General: Bowel sounds are normal  There is no distension  Palpations: Abdomen is soft  Musculoskeletal:         General: Swelling (trace b/l LE) present  Normal range of motion  Cervical back: Normal range of motion and neck supple  Skin:     General: Skin is warm and dry  Findings: No rash  Neurological:      General: No focal deficit present  Mental Status: She is alert  Motor: No abnormal muscle tone        Comments: awake Psychiatric:         Mood and Affect: Mood normal          Behavior: Behavior normal          Medications:    Current Outpatient Medications:     albuterol (2 5 mg/3 mL) 0 083 % nebulizer solution, Take 3 mL (2 5 mg total) by nebulization every 6 (six) hours as needed for wheezing or shortness of breath, Disp: 360 mL, Rfl: 1    albuterol (Ventolin HFA) 90 mcg/act inhaler, Inhale 2 puffs every 6 (six) hours as needed for wheezing, Disp: 18 g, Rfl: 5    Calcium Carbonate-Vitamin D3 600-400 MG-UNIT TABS, Take 1 tablet by mouth daily with dinner , Disp: , Rfl:     Coenzyme Q10 (CO Q 10) 100 MG CAPS, Take 1 capsule by mouth daily with lunch , Disp: , Rfl:     diltiazem (CARDIZEM CD) 240 mg 24 hr capsule, Take 240 mg by mouth daily , Disp: , Rfl:     fexofenadine (ALLEGRA) 180 MG tablet, Take 1 tablet by mouth daily in the early morning , Disp: , Rfl:     fluticasone (FLONASE) 50 mcg/act nasal spray, 1 spray into each nostril 2 (two) times a day, Disp: 18 2 mL, Rfl: 6    fluticasone-umeclidinium-vilanterol (Trelegy Ellipta) 200-62 5-25 MCG/INH AEPB inhaler, Inhale 1 puff daily Rinse mouth after use , Disp: 60 blister, Rfl: 5    guaiFENesin (MUCINEX) 600 mg 12 hr tablet, Take 1,200 mg by mouth every 12 (twelve) hours, Disp: , Rfl:     hydrochlorothiazide (HYDRODIURIL) 25 mg tablet, Take 1 tablet by mouth daily in the early morning , Disp: , Rfl:     levothyroxine 112 mcg tablet, Take 112 mcg by mouth daily in the early morning , Disp: , Rfl:     lisinopril (ZESTRIL) 5 mg tablet, Take 1 tablet by mouth daily in the early morning , Disp: , Rfl:     lovastatin (MEVACOR) 20 mg tablet, Take 1 tablet by mouth daily with dinner , Disp: , Rfl:     montelukast (SINGULAIR) 10 mg tablet, Take 1 tablet (10 mg total) by mouth daily, Disp: 90 tablet, Rfl: 3    Multiple Vitamins-Minerals (MULTI FOR HER PO), Take 1 tablet by mouth daily with breakfast, Disp: , Rfl:     rosuvastatin (CRESTOR) 10 MG tablet, Take 10 mg by mouth daily, Disp: , Rfl:     SUPER B COMPLEX/C CAPS, Take 1 capsule by mouth daily with dinner , Disp: , Rfl:     dextromethorphan-guaifenesin (MUCINEX DM)  MG per 12 hr tablet, Take 1 tablet by mouth every 12 (twelve) hours (Patient not taking: Reported on 1/10/2022 ), Disp: 20 tablet, Rfl: 1    diltiazem (DILT-XR) 240 MG 24 hr capsule, Take 1 capsule by mouth daily with dinner  (Patient not taking: Reported on 1/10/2022 ), Disp: , Rfl:     nystatin (MYCOSTATIN) 500,000 units/5 mL suspension, Apply 5 mL (500,000 Units total) to the mouth or throat 4 (four) times a day (Patient not taking: Reported on 1/10/2022 ), Disp: 200 mL, Rfl: 0    Allergies: Allergies as of 01/10/2022 - Reviewed 01/10/2022   Allergen Reaction Noted    Pollen extract Allergic Rhinitis and Cough 03/13/2015       The following portions of the patient's history were reviewed and updated as appropriate: past family history, past surgical history and problem list     Laboratory Results:  Lab Results   Component Value Date    K 4 4 03/18/2019        No results found for: PTH, CALCIUM, CAION, PHOS    Portions of the record may have been created with voice recognition software   Occasional wrong word or "sound a like" substitutions may have occurred due to the inherent limitations of voice recognition software   Read the chart carefully and recognize, using context, where substitutions have occurred

## 2022-01-11 ENCOUNTER — TELEPHONE (OUTPATIENT)
Dept: NEPHROLOGY | Facility: CLINIC | Age: 77
End: 2022-01-11

## 2022-01-11 DIAGNOSIS — N18.32 STAGE 3B CHRONIC KIDNEY DISEASE (HCC): Primary | ICD-10-CM

## 2022-01-11 NOTE — TELEPHONE ENCOUNTER
Spoke with patient about repeating lab work in a week  She expressed understanding to limit hydration and make sure she has good food intake  She said she has to stay hydrated because of the medications she takes for COPD so it makes it hard for her to not stay hydrated

## 2022-01-17 ENCOUNTER — HOSPITAL ENCOUNTER (OUTPATIENT)
Dept: MAMMOGRAPHY | Facility: CLINIC | Age: 77
Discharge: HOME | End: 2022-01-17

## 2022-02-07 ENCOUNTER — OFFICE VISIT (OUTPATIENT)
Dept: PULMONOLOGY | Facility: CLINIC | Age: 77
End: 2022-02-07
Payer: COMMERCIAL

## 2022-02-07 VITALS
DIASTOLIC BLOOD PRESSURE: 70 MMHG | BODY MASS INDEX: 31.82 KG/M2 | WEIGHT: 179.6 LBS | TEMPERATURE: 98.7 F | HEART RATE: 76 BPM | SYSTOLIC BLOOD PRESSURE: 124 MMHG | RESPIRATION RATE: 18 BRPM | OXYGEN SATURATION: 98 % | HEIGHT: 63 IN

## 2022-02-07 DIAGNOSIS — J44.9 COPD, SEVERE (HCC): Primary | ICD-10-CM

## 2022-02-07 DIAGNOSIS — J45.909 UNCOMPLICATED ASTHMA, UNSPECIFIED ASTHMA SEVERITY, UNSPECIFIED WHETHER PERSISTENT: ICD-10-CM

## 2022-02-07 DIAGNOSIS — J30.1 NON-SEASONAL ALLERGIC RHINITIS DUE TO POLLEN: ICD-10-CM

## 2022-02-07 DIAGNOSIS — F17.211 CIGARETTE NICOTINE DEPENDENCE IN REMISSION: ICD-10-CM

## 2022-02-07 DIAGNOSIS — J45.40 MODERATE PERSISTENT ASTHMA WITHOUT COMPLICATION: ICD-10-CM

## 2022-02-07 PROCEDURE — 99214 OFFICE O/P EST MOD 30 MIN: CPT | Performed by: INTERNAL MEDICINE

## 2022-02-07 RX ORDER — FLUTICASONE FUROATE, UMECLIDINIUM BROMIDE AND VILANTEROL TRIFENATATE 200; 62.5; 25 UG/1; UG/1; UG/1
1 POWDER RESPIRATORY (INHALATION) DAILY
Qty: 60 BLISTER | Refills: 5 | Status: SHIPPED | OUTPATIENT
Start: 2022-02-07 | End: 2022-08-06

## 2022-02-07 RX ORDER — MONTELUKAST SODIUM 10 MG/1
10 TABLET ORAL DAILY
Qty: 90 TABLET | Refills: 3 | Status: SHIPPED | OUTPATIENT
Start: 2022-02-07

## 2022-02-07 NOTE — PROGRESS NOTES
Pulmonary Follow Up Note   Bob Romero 77 y o  female MRN: [id number]  2/7/2022      Assessment/Plan:     COPD, severe ([address] )  COPD is stable  She will continue the Trelegy Ellipta once daily  She rinses her mouth out after each use  Prescription given for refills  Moderate persistent asthma without complication  I have also refilled her Singulair to use once daily  Non-seasonal allergic rhinitis due to pollen  She will continue Flonase nasal spray on a daily basis    Cigarette nicotine dependence in remission  Patient has a 49 pack year smoking history and quit in 2008  Chest CT from October 2021 showed no evidence of pulmonary nodules  Plan to repeat this October    Visit orders:    Diagnoses and all orders for this visit:    COPD, severe ([address] )  -     fluticasone-umeclidinium-vilanterol (Trelegy Ellipta) 200-62 5-25 MCG/INH AEPB inhaler; Inhale 1 puff daily Rinse mouth after use  Moderate persistent asthma without complication    Uncomplicated asthma, unspecified asthma severity, unspecified whether persistent  -     montelukast (SINGULAIR) 10 mg tablet; Take 1 tablet (10 mg total) by mouth daily    Non-seasonal allergic rhinitis due to pollen    Cigarette nicotine dependence in remission      No follow-ups on file  History of Present Illness   HPI:  Bob Romero is a 77 y o  female who today for follow-up regarding severe COPD/asthma  She is doing well from pulmonary perspective  She is using Trelegy Ellipta once daily  She is not needing rescue therapy on any regular basis  She reports having issues with dry mouth and decided to change her hydrochlorothiazide every other day  She feels this has improved both her dry mouth as well as her cough  She is not coughing as vigorously  She has no wheezing  She denies shortness of Breath  She has no lower extremity edema  She is using Flonase which significantly improved postnasal drip      Review of Systems   Constitutional: Negative for chills, fever and unexpected weight change  HENT: Positive for voice change  Negative for postnasal drip and sore throat  Eyes: Negative for visual disturbance  Respiratory:        As noted in HPI   Cardiovascular: Negative for chest pain  Gastrointestinal: Negative for abdominal pain, diarrhea and vomiting  Musculoskeletal: Negative for arthralgias  Skin: Negative for rash  Neurological: Negative for headaches  Hematological: Negative for adenopathy  Psychiatric/Behavioral: Negative  All other systems reviewed and are negative        Medical, Family and Social history reviewed and updated as appropriate    Historical Information   Past Medical History:   Diagnosis Date    Breast cancer ([place] [address] ) 2012    left     Cancer [facility])     vocal cord cancer, s/p radiation (2008)    Chronic kidney disease     COPD (chronic obstructive pulmonary disease) ([place] [address] )     History of radiation therapy 2008    Hyperlipidemia     Hypertension     Hypothyroidism     Renal cyst      Past Surgical History:   Procedure Laterality Date    BREAST BIOPSY Left 06/22/2012    in situ    BREAST LUMPECTOMY Left 07/24/2012    in situ    CHOLECYSTECTOMY      COLONOSCOPY      [facility] LARYNGOSCOPY,DIRECT,SCOPE,INJ CORDS Left 3/29/2019    Procedure: MICROLARYNGOSCOPY AND EXCISION OF LEFT VOCAL CORD LESION;  Surgeon: Miriam Quiroz MD;  [facility];  Service: ENT    SKIN CANCER EXCISION  07/07/2011    Squamous cell    TONSILLECTOMY       Family History   Problem Relation Age of Onset    Heart disease Mother     Emphysema Mother     Diabetes Mother     Heart disease Father     No Known Problems Daughter     No Known Problems Maternal Grandmother     COPD Maternal Grandfather     No Known Problems Paternal Grandmother     Stroke Paternal Grandfather     No Known Problems Son     No Known Problems Maternal Aunt     No Known Problems Paternal Aunt     No Known Problems Paternal Aunt     No Known Problems Paternal Aunt        Social History     Tobacco Use   Smoking Status Former Smoker    Packs/day: 1 00    Years: 49 00    Pack years: 49 00    Types: Cigarettes    Start date:     Quit date: 2008    Years since quittin 1   Smokeless Tobacco Never Used     Meds/Allergies     Current Outpatient Medications:     albuterol (2 5 mg/3 mL) 0 083 % nebulizer solution, Take 3 mL (2 5 mg total) by nebulization every 6 (six) hours as needed for wheezing or shortness of breath, Disp: 360 mL, Rfl: 1    albuterol (Ventolin HFA) 90 mcg/act inhaler, Inhale 2 puffs every 6 (six) hours as needed for wheezing, Disp: 18 g, Rfl: 5    Calcium Carbonate-Vitamin D3 600-400 MG-UNIT TABS, Take 1 tablet by mouth daily with dinner , Disp: , Rfl:     Coenzyme Q10 (CO Q 10) 100 MG CAPS, Take 1 capsule by mouth daily with lunch , Disp: , Rfl:     diltiazem (Dilt-XR) 240 MG 24 hr capsule, Take 1 capsule by mouth daily with dinner  , Disp: , Rfl:     fexofenadine (ALLEGRA) 180 MG tablet, Take 1 tablet by mouth daily in the early morning , Disp: , Rfl:     fluticasone (FLONASE) 50 mcg/act nasal spray, 1 spray into each nostril 2 (two) times a day, Disp: 18 2 mL, Rfl: 6    fluticasone-umeclidinium-vilanterol (Trelegy Ellipta) 200-62 5-25 MCG/INH AEPB inhaler, Inhale 1 puff daily Rinse mouth after use , Disp: 60 blister, Rfl: 5    guaiFENesin (MUCINEX) 600 mg 12 hr tablet, Take 1,200 mg by mouth every 12 (twelve) hours, Disp: , Rfl:     hydrochlorothiazide (HYDRODIURIL) 25 mg tablet, Take 1 tablet by mouth daily in the early morning , Disp: , Rfl:     lisinopril (ZESTRIL) 5 mg tablet, Take 1 tablet by mouth daily in the early morning , Disp: , Rfl:     montelukast (SINGULAIR) 10 mg tablet, Take 1 tablet (10 mg total) by mouth daily, Disp: 90 tablet, Rfl: 3    Multiple Vitamins-Minerals (MULTI FOR HER PO), Take 1 tablet by mouth daily with breakfast, Disp: , Rfl:     rosuvastatin (CRESTOR) 10 MG tablet, Take 10 mg by mouth daily, Disp: , Rfl:     SUPER B COMPLEX/C CAPS, Take 1 capsule by mouth daily with dinner , Disp: , Rfl:     levothyroxine 112 mcg tablet, Take 112 mcg by mouth daily in the early morning , Disp: , Rfl:   Allergies   Allergen Reactions    Pollen Extract Allergic Rhinitis and Cough       Vitals: Blood pressure 124/70, pulse 76, temperature 98 7 °F (37 1 °C), temperature source Tympanic, resp  rate 18, height 5' 3" (1 6 m), weight 81 5 kg (179 lb 9 6 oz), SpO2 98 %  Body mass index is 31 81 kg/m²  Oxygen Therapy  SpO2: 98 %    Physical Exam  Constitutional:       General: She is not in acute distress  HENT:      Head: Normocephalic  Mouth/Throat:      Pharynx: No oropharyngeal exudate  Eyes:      General: No scleral icterus  Pupils: Pupils are equal, round, and reactive to light  Neck:      Vascular: No JVD  Cardiovascular:      Rate and Rhythm: Normal rate and regular rhythm  Pulmonary:      Breath sounds: No wheezing, rhonchi or rales  Abdominal:      Palpations: Abdomen is soft  Tenderness: There is no abdominal tenderness  Musculoskeletal:         General: No swelling  Cervical back: Neck supple  Lymphadenopathy:      Cervical: No cervical adenopathy  Skin:     General: Skin is warm and dry  Neurological:      Mental Status: She is alert and oriented to person, place, and time  Psychiatric:         Mood and Affect: Mood normal        Labs: I have personally reviewed pertinent lab results  Lab Results   Component Value Date    WBC 7 14 03/18/2019    HGB 14 3 03/18/2019    HCT 42 8 03/18/2019    MCV 97 03/18/2019     03/18/2019     Lab Results   Component Value Date    K 4 4 03/18/2019     Imaging and other studies: I have personally reviewed pertinent reports     and I have personally reviewed pertinent films in PACS Chest CT 10/18/21 - lungs clear    Pulmonary function testing:  Performed 10/20/21 and personally interpreted  FEV1/FVC ratio 49%   FEV1 48% predicted  FVC 77% predicted  No response to bronchodilators   % predicted   % predicted  DLCO corrected for hemoglobin 49 % predicted    Severe obstruction and diffusion impairment

## 2022-02-07 NOTE — ASSESSMENT & PLAN NOTE
Patient has a 49 pack year smoking history and quit in 2008  Chest CT from October 2021 showed no evidence of pulmonary nodules    Plan to repeat this October

## 2022-02-07 NOTE — ASSESSMENT & PLAN NOTE
COPD is stable  She will continue the Trelegy Ellipta once daily  She rinses her mouth out after each use  Prescription given for refills

## 2022-02-11 ENCOUNTER — HOSPITAL ENCOUNTER (OUTPATIENT)
Dept: MAMMOGRAPHY | Facility: CLINIC | Age: 77
Discharge: HOME | End: 2022-02-11
Payer: COMMERCIAL

## 2022-02-11 VITALS
WEIGHT: 179 LBS | HEART RATE: 72 BPM | DIASTOLIC BLOOD PRESSURE: 64 MMHG | HEIGHT: 63 IN | BODY MASS INDEX: 31.71 KG/M2 | SYSTOLIC BLOOD PRESSURE: 112 MMHG

## 2022-02-11 DIAGNOSIS — R92.8 ABNORMAL MAMMOGRAM: ICD-10-CM

## 2022-02-11 PROCEDURE — 88342 IMHCHEM/IMCYTCHM 1ST ANTB: CPT | Performed by: PATHOLOGY

## 2022-02-11 PROCEDURE — 88361 TUMOR IMMUNOHISTOCHEM/COMPUT: CPT | Performed by: PATHOLOGY

## 2022-02-11 PROCEDURE — 88341 IMHCHEM/IMCYTCHM EA ADD ANTB: CPT | Performed by: PATHOLOGY

## 2022-02-11 PROCEDURE — A4648 IMPLANTABLE TISSUE MARKER: HCPCS

## 2022-02-11 PROCEDURE — 88305 TISSUE EXAM BY PATHOLOGIST: CPT | Performed by: PATHOLOGY

## 2022-02-11 PROCEDURE — 19081 BX BREAST 1ST LESION STRTCTC: CPT

## 2022-02-11 RX ORDER — LIDOCAINE HYDROCHLORIDE 10 MG/ML
5 INJECTION, SOLUTION EPIDURAL; INFILTRATION; INTRACAUDAL; PERINEURAL ONCE
Status: DISCONTINUED | OUTPATIENT
Start: 2022-02-11 | End: 2022-02-11

## 2022-02-11 RX ORDER — LIDOCAINE HYDROCHLORIDE 10 MG/ML
5 INJECTION, SOLUTION EPIDURAL; INFILTRATION; INTRACAUDAL; PERINEURAL ONCE
Status: COMPLETED | OUTPATIENT
Start: 2022-02-11 | End: 2022-02-11

## 2022-02-11 RX ORDER — LIDOCAINE HYDROCHLORIDE AND EPINEPHRINE BITARTRATE 20; .01 MG/ML; MG/ML
10 INJECTION, SOLUTION SUBCUTANEOUS ONCE
Status: COMPLETED | OUTPATIENT
Start: 2022-02-11 | End: 2022-02-11

## 2022-02-11 RX ORDER — LIDOCAINE HYDROCHLORIDE AND EPINEPHRINE BITARTRATE 20; .01 MG/ML; MG/ML
10 INJECTION, SOLUTION SUBCUTANEOUS ONCE
Status: DISCONTINUED | OUTPATIENT
Start: 2022-02-11 | End: 2022-02-11

## 2022-02-11 RX ADMIN — LIDOCAINE HYDROCHLORIDE AND EPINEPHRINE 10 ML: 20; 10 INJECTION, SOLUTION INFILTRATION; PERINEURAL at 12:50

## 2022-02-11 RX ADMIN — LIDOCAINE HYDROCHLORIDE 5 ML: 10 INJECTION, SOLUTION EPIDURAL; INFILTRATION; INTRACAUDAL; PERINEURAL at 12:50

## 2022-02-11 NOTE — PROGRESS NOTES
Patient arrived via:    ___X__ambulatory    _____wheelchair    _____stretcher      Domestic violence screen    __X____negative______positive    Breast Implants:    _______yes ____X____no

## 2022-02-11 NOTE — PROGRESS NOTES
Patient arrived via:    __x___ambulatory    _____wheelchair    _____stretcher      Domestic violence screen    ___x___negative______positive    Breast Implants:    _______yes ______x__no

## 2022-02-11 NOTE — PROGRESS NOTES
Procedure type:    _____ultrasound guided ___X__stereotactic    Breast:    _____Left __X___Right    Location:  Right breast 10 o'clock    Needle: 8ga Revolve    # of passes: 2 cores with calcs and 1 core without calcs    Clip: Hudson(Mammotome)    Performed by: Dr Juan Diego Seo held for 5 minutes by:  Diana Kaufman(PCA)    Steri Strips:    __X___yes _____no    Band aid:    ___X__yes_____no    Tape and guaze:    _____yes ___X__no    Tolerated procedure:    ___X__yes _____no

## 2022-02-11 NOTE — DISCHARGE INSTR - OTHER ORDERS
POST LARGE CORE BREAST BIOPSY PATIENT INFORMATION      Place an ice pack inside your bra over the top of the dressing every hour for 20 minutes (20 minutes on, 60 minutes off)  Do this until bedtime  Do not shower or bathe until the following morning  You may bathe your breast carefully with the steri-strips in place  Be careful    Not to loosen them  The steri-strips will fall off in 3-5 days  You may have mild discomfort, and you may have some bruising where the   Needle entered the skin  This should clear within 5-7 days  If you need medicine for discomfort, take acetaminophen products such as   Tylenol  You may also take Advil or Motrin products  Do not participate in strenuous activities such as-tennis, aerobics, skiing,  Weight lifting, etc  for 24 hours  Refrain from swimming/soaking for 72 hours  Wearing a bra for sleeping may be more comfortable for the first 24-48 hours  Watch for continued bleeding, pain or fever over 101; please call with any questions or concerns  For procedures done at the [facility]  [address] call:  Antony Angelucci RN at [phone]  Anil Montenegro RN at [phone]                    *After 4 PM call the Interventional Radiology Department                    [phone] and ask to speak with the nurse on call  For procedures done at the [address] [place] [address] call:         Antonio Schofield RN at   *After 4 PM call the Interventional Radiology Department   [phone] and ask to speak with the nurse on call  For procedures done at [address] [place] [address] call: The Radiology Nurse at [phone]  *After 4 PM call your physician, or go to the Emergency Department  9          The final results of your biopsy are usually available within one week

## 2022-02-14 ENCOUNTER — TELEPHONE (OUTPATIENT)
Dept: SURGICAL ONCOLOGY | Facility: CLINIC | Age: 77
End: 2022-02-14

## 2022-02-14 NOTE — TELEPHONE ENCOUNTER
Spoke with patient and reviewed results from recent breast biopsy which revealed DCIS  Reviewed recommendation for surgical consultation  Appt given with Dr Jaiden Sanchez on 2/24/22 at 2 pm  Pt states she is healing well from her biopsy  All of her questions were answered at this time

## 2022-02-14 NOTE — PROGRESS NOTES
Post procedure call completed 2/14/22 at 9:30  Bleeding: _____yes __X___no    Pain: _____yes ___X___no    Redness/Swelling: ______yes ___X___no    Band aid removed: __X___yes _____no    Steri-Strips intact: ___X___yes _____no (discussed with patient to remove steri strips on  2/16/22  if they have not come off on their own)    Pt with no questions at this time, adv will call when results available, adv to call with any questions or concerns, has name/# for contact

## 2022-02-22 PROBLEM — C50.912 LOBULAR CARCINOMA OF LEFT BREAST (HCC): Status: RESOLVED | Noted: 2021-05-11 | Resolved: 2022-02-22

## 2022-02-22 PROBLEM — D05.11 DUCTAL CARCINOMA IN SITU (DCIS) OF RIGHT BREAST: Status: ACTIVE | Noted: 2022-02-22

## 2022-02-22 PROBLEM — Z23 NEED FOR INFLUENZA VACCINATION: Status: RESOLVED | Noted: 2021-11-04 | Resolved: 2022-02-22

## 2022-02-22 PROBLEM — Z12.31 VISIT FOR SCREENING MAMMOGRAM: Status: RESOLVED | Noted: 2019-05-07 | Resolved: 2022-02-22

## 2022-02-24 ENCOUNTER — DOCUMENTATION (OUTPATIENT)
Dept: GENETICS | Facility: CLINIC | Age: 77
End: 2022-02-24

## 2022-02-24 ENCOUNTER — OFFICE VISIT (OUTPATIENT)
Dept: SURGICAL ONCOLOGY | Facility: CLINIC | Age: 77
End: 2022-02-24
Payer: COMMERCIAL

## 2022-02-24 VITALS
BODY MASS INDEX: 32.16 KG/M2 | SYSTOLIC BLOOD PRESSURE: 120 MMHG | TEMPERATURE: 97.9 F | HEART RATE: 97 BPM | OXYGEN SATURATION: 94 % | RESPIRATION RATE: 18 BRPM | WEIGHT: 181.5 LBS | DIASTOLIC BLOOD PRESSURE: 84 MMHG | HEIGHT: 63 IN

## 2022-02-24 DIAGNOSIS — Z01.818 PREOPERATIVE TESTING: ICD-10-CM

## 2022-02-24 DIAGNOSIS — D05.11 DUCTAL CARCINOMA IN SITU (DCIS) OF RIGHT BREAST: Primary | ICD-10-CM

## 2022-02-24 DIAGNOSIS — N60.92 ATYPICAL DUCTAL HYPERPLASIA OF LEFT BREAST: ICD-10-CM

## 2022-02-24 PROCEDURE — 99215 OFFICE O/P EST HI 40 MIN: CPT | Performed by: SURGERY

## 2022-02-24 RX ORDER — OXYCODONE HYDROCHLORIDE AND ACETAMINOPHEN 5; 325 MG/1; MG/1
1 TABLET ORAL EVERY 6 HOURS PRN
Qty: 6 TABLET | Refills: 0 | Status: SHIPPED | OUTPATIENT
Start: 2022-02-24

## 2022-02-24 NOTE — H&P (VIEW-ONLY)
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   She had a history of ADH of the left side  The DCIS covers an area of approximately 2 cm  I have recommended a LIBBY  directed lumpectomy and possibly a DCISionRT test   The patient is reluctant to have radiation therapy though I think this test will help us delineate adjuvant therapies  We went through the process of informed consent for a LIBBY  lumpectomy on the right side  All questions were answered the patient's satisfaction  Cancer History:     Oncology History   Atypical ductal hyperplasia of left breast   7/27/2012 Surgery    Left lumpectomy  Atypical ductal hyperplasia  Atypical lobular hyperplasia  Negative for malignancy     Ductal carcinoma in situ (DCIS) of right breast   2/11/2022 Biopsy    Right breast stereotactic biopsy  10 o'clock, middle  Ductal carcinoma in situ  Grade 2  , WA 90    Concordant  Malignancy may be locally multifocal, spanning 2 cm  US right axilla not performed  Left breast clear on 11/2021 mammo  Interval History: This is a 76-year-old woman who is being followed for elevated risk of breast cancer based on a diagnosis of atypical ductal hyperplasia  She had no abnormalities in the left breast however on the right side at the 10 o'clock position 8 cm from the nipple at mid depth was an area of microcalcifications covering an area of approximately 2 cm    This was biopsied and shown to be grade 2 ductal carcinoma in Situ % OH 90%  She did not have an axillary ultrasound  She had a bow tie clip placed at the site  She presents now for an opinion regarding further management  Review of Systems:   Review of Systems   Constitutional: Negative for activity change, appetite change and fatigue  HENT: Negative  Eyes: Negative  Respiratory: Negative for cough, shortness of breath and wheezing  Cardiovascular: Negative for chest pain and leg swelling  Gastrointestinal: Negative  Endocrine: Negative  Genitourinary: Negative  Musculoskeletal:        No new changes or complaints of bone pain   Skin: Negative  Allergic/Immunologic: Negative  Neurological: Negative  Hematological: Negative  Psychiatric/Behavioral: Negative          Past Medical History     Patient Active Problem List   Diagnosis    CKD (chronic kidney disease) stage 3, GFR 30-59 ml/min ([facility])    COPD, severe (H[facility])    Essential hypertension    Hypercholesteremia    Renal cyst, acquired, right    Squamous cell cancer of skin of buttock    Environmental allergies    Hypothyroidism    Atypical ductal hyperplasia of left breast    Mixed hyperlipidemia    Nocturia    Sciatica    Spinal stenosis of lumbar region without neurogenic claudication    Increased frequency of urination    Moderate persistent asthma without complication    Lesion of vocal cord    [facility]ARETT [facility]) [facility] )    Cigarette nicotine dependence in remission    Chronic cough    Non-seasonal allergic rhinitis due to pollen    Ductal carcinoma in situ (DCIS) of right breast     Past Medical History:   Diagnosis Date    Cancer [facility])     vocal cord cancer, s/p radiation (2008)    Chronic kidney disease     COPD (chronic obstructive pulmonary disease) ([facility] )     History of radiation therapy 2008    Hyperlipidemia     Hypertension     Hypothyroidism     Renal cyst      Past Surgical History:   Procedure Laterality Date    BREAST BIOPSY Left 06/22/2012 in situ    BREAST BIOPSY Right 2022    stereo    BREAST LUMPECTOMY Left 2012    Left breast ADH/ALH    CHOLECYSTECTOMY      COLONOSCOPY      MAMMO STEREOTACTIC BREAST BIOPSY RIGHT ([facility]) Right 2022    [facility],DIRECT,SCOPE,INJ CORDS Left 2019    Procedure: MICROLARYNGOSCOPY AND EXCISION OF LEFT VOCAL CORD LESION;  Surgeon: Robert Harrington MD;  [facility];  Service: ENT    SKIN CANCER EXCISION  2011    Squamous cell    TONSILLECTOMY       Family History   Problem Relation Age of Onset    Heart disease Mother     Emphysema Mother     Diabetes Mother     Heart disease Father     No Known Problems Daughter     No Known Problems Maternal Grandmother     COPD Maternal Grandfather     No Known Problems Paternal Grandmother     Stroke Paternal Grandfather     No Known Problems Son     No Known Problems Maternal Aunt     No Known Problems Paternal Aunt     No Known Problems Paternal Aunt     No Known Problems Paternal Aunt      Social History     Socioeconomic History    Marital status: /Civil Union     Spouse name: Not on file    Number of children: Not on file    Years of education: Not on file    Highest education level: Not on file   Occupational History    Not on file   Tobacco Use    Smoking status: Former Smoker     Packs/day: 1 00     Years: 49 00     Pack years: 49 00     Types: Cigarettes     Start date:      Quit date: 2008     Years since quittin 1    Smokeless tobacco: Never Used   Vaping Use    Vaping Use: Never used   Substance and Sexual Activity    Alcohol use: Yes     Comment: socially    Drug use: No    Sexual activity: Not Currently   Other Topics Concern    Not on file   Social History Narrative    Drinks 2 cups of coffee a day      Social Determinants of Health     Financial Resource Strain: Not on file   Food Insecurity: Not on file   Transportation Needs: Not on file   Physical Activity: Not on file Stress: Not on file   Social Connections: Not on file   Intimate Partner Violence: Not on file   Housing Stability: Not on file       Current Outpatient Medications:     albuterol (2 5 mg/3 mL) 0 083 % nebulizer solution, Take 3 mL (2 5 mg total) by nebulization every 6 (six) hours as needed for wheezing or shortness of breath, Disp: 360 mL, Rfl: 1    albuterol (Ventolin HFA) 90 mcg/act inhaler, Inhale 2 puffs every 6 (six) hours as needed for wheezing, Disp: 18 g, Rfl: 5    Calcium Carbonate-Vitamin D3 600-400 MG-UNIT TABS, Take 1 tablet by mouth daily with dinner , Disp: , Rfl:     Coenzyme Q10 (CO Q 10) 100 MG CAPS, Take 1 capsule by mouth daily with lunch , Disp: , Rfl:     diltiazem (Dilt-XR) 240 MG 24 hr capsule, Take 1 capsule by mouth daily with dinner  , Disp: , Rfl:     fexofenadine (ALLEGRA) 180 MG tablet, Take 1 tablet by mouth daily in the early morning , Disp: , Rfl:     fluticasone (FLONASE) 50 mcg/act nasal spray, 1 spray into each nostril 2 (two) times a day, Disp: 18 2 mL, Rfl: 6    fluticasone-umeclidinium-vilanterol (Trelegy Ellipta) 200-62 5-25 MCG/INH AEPB inhaler, Inhale 1 puff daily Rinse mouth after use , Disp: 60 blister, Rfl: 5    guaiFENesin (MUCINEX) 600 mg 12 hr tablet, Take 1,200 mg by mouth every 12 (twelve) hours, Disp: , Rfl:     hydrochlorothiazide (HYDRODIURIL) 25 mg tablet, Take 1 tablet by mouth daily in the early morning , Disp: , Rfl:     lisinopril (ZESTRIL) 5 mg tablet, Take 1 tablet by mouth daily in the early morning , Disp: , Rfl:     montelukast (SINGULAIR) 10 mg tablet, Take 1 tablet (10 mg total) by mouth daily, Disp: 90 tablet, Rfl: 3    Multiple Vitamins-Minerals (MULTI FOR HER PO), Take 1 tablet by mouth daily with breakfast, Disp: , Rfl:     rosuvastatin (CRESTOR) 10 MG tablet, Take 10 mg by mouth daily, Disp: , Rfl:     SUPER B COMPLEX/C CAPS, Take 1 capsule by mouth daily with dinner , Disp: , Rfl:     levothyroxine 112 mcg tablet, Take 112 mcg by mouth daily in the early morning , Disp: , Rfl:   Allergies   Allergen Reactions    Pollen Extract Allergic Rhinitis and Cough       Physical Exam:     Vitals:    02/24/22 1401   BP: 120/84   Pulse: 97   Resp: 18   Temp: 97 9 °F (36 6 °C)   SpO2: 94%     Physical Exam  Vitals reviewed  Constitutional:       Appearance: She is well-developed  HENT:      Head: Normocephalic and atraumatic  Eyes:      Pupils: Pupils are equal, round, and reactive to light  Neck:      Thyroid: No thyromegaly  Vascular: No JVD  Trachea: No tracheal deviation  Cardiovascular:      Rate and Rhythm: Normal rate and regular rhythm  Heart sounds: Normal heart sounds  No murmur heard  No friction rub  No gallop  Pulmonary:      Effort: Pulmonary effort is normal  No respiratory distress  Breath sounds: Normal breath sounds  No wheezing or rales  Chest:   Breasts: Breasts are symmetrical       Right: No inverted nipple, mass, nipple discharge, skin change or tenderness  Left: No inverted nipple, mass, nipple discharge, skin change or tenderness  Comments: Both breasts were examined in the sitting and supine position  There are no worrisome skin lesions, no nipple retraction and no nipple discharge  There are no dominant masses, axillary adenopathy or supraclavicular adenopathy on either side  Abdominal:      General: There is no distension  Palpations: Abdomen is soft  There is no hepatomegaly or mass  Tenderness: There is no abdominal tenderness  There is no guarding or rebound  Musculoskeletal:         General: No tenderness  Normal range of motion  Cervical back: Normal range of motion and neck supple  Lymphadenopathy:      Cervical: No cervical adenopathy  Skin:     General: Skin is warm and dry  Findings: No erythema or rash  Neurological:      Mental Status: She is alert and oriented to person, place, and time        Cranial Nerves: No cranial nerve deficit  Psychiatric:         Behavior: Behavior normal            Results & Discussion:   Patient has new diagnosis of right breast ductal carcinoma in Situ  I have recommended breast conservation therapy  We talked about a LIBBY directed lumpectomy  The patient understands that radiation might be recommended  She is not in favor that nor and anti hormonal pill based on their side effects  We talked about possibly performing a prelude decision test postoperatively to help delineate the relative merits of radiation therapy  Further discussions will be had in her postoperative visit regarding adjuvant therapies  All questions were answered the patient's satisfaction  The patient and I underwent the process of consent for right breast LIBBY  directed lumpectomy  The complications outlined on the consent including relatively minor problems (wound infection, wound healing problems, hematomas, scarring, chronic discomfort/pain), moderate problems (injury to nerves or blood vessels, allergic reactions) and major complications (extensive blood loss requiring transfusions or possible addtional surgeries, cardiac arrest, stroke and possible death)  We also reviewed specific complications as outlined on the consent form including possible need for adjuvant therapies or additional surgeries  All questions were answered to the patient's satisfaction  We will coordinate the surgery at our next mutual convience  Advance Care Planning/Advance Directives:  I discussed the disease status, treatment plans and follow-up with the patient

## 2022-02-24 NOTE — PROGRESS NOTES
Surgical Oncology Follow Up       [address]  CANCER CARE [facility] SURGICAL ONCOLOGY [place]  [address] [facility] [address]    [facility]  8/27/4791  [id number]  [address] [place] [address] SURGICAL ONCOLOGY [place]  [address] 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   She had a history of ADH of the left side  The DCIS covers an area of approximately 2 cm  I have recommended a LIBBY  directed lumpectomy and possibly a DCISionRT test   The patient is reluctant to have radiation therapy though I think this test will help us delineate adjuvant therapies  We went through the process of informed consent for a LIBBY  lumpectomy on the right side  All questions were answered the patient's satisfaction  Cancer History:     Oncology History   Atypical ductal hyperplasia of left breast   7/27/2012 Surgery    Left lumpectomy  Atypical ductal hyperplasia  Atypical lobular hyperplasia  Negative for malignancy     Ductal carcinoma in situ (DCIS) of right breast   2/11/2022 Biopsy    Right breast stereotactic biopsy  10 o'clock, middle  Ductal carcinoma in situ  Grade 2  , [place] 90    Concordant  Malignancy may be locally multifocal, spanning 2 cm  US right axilla not performed  Left breast clear on 11/2021 mammo  Interval History: This is a 67-year-old woman who is being followed for elevated risk of breast cancer based on a diagnosis of atypical ductal hyperplasia  She had no abnormalities in the left breast however on the right side at the 10 o'clock position 8 cm from the nipple at mid depth was an area of microcalcifications covering an area of approximately 2 cm    This was biopsied and shown to be grade 2 ductal carcinoma in Situ % [place] 90%  She did not have an axillary ultrasound  She had a bow tie clip placed at the site  She presents now for an opinion regarding further management  Review of Systems:   Review of Systems   Constitutional: Negative for activity change, appetite change and fatigue  HENT: Negative  Eyes: Negative  Respiratory: Negative for cough, shortness of breath and wheezing  Cardiovascular: Negative for chest pain and leg swelling  Gastrointestinal: Negative  Endocrine: Negative  Genitourinary: Negative  Musculoskeletal:        No new changes or complaints of bone pain   Skin: Negative  Allergic/Immunologic: Negative  Neurological: Negative  Hematological: Negative  Psychiatric/Behavioral: Negative          Past Medical History     Patient Active Problem List   Diagnosis    CKD (chronic kidney disease) stage 3, GFR 30-59 ml/min ([facility])    COPD, severe (HCC)    Essential hypertension    Hypercholesteremia    Renal cyst, acquired, right    Squamous cell cancer of skin of buttock    Environmental allergies    Hypothyroidism    Atypical ductal hyperplasia of left breast    Mixed hyperlipidemia    Nocturia    Sciatica    Spinal stenosis of lumbar region without neurogenic claudication    Increased frequency of urination    Moderate persistent asthma without complication    Lesion of vocal cord    JARETT (acute kidney injury) ([facility] [address] )    Cigarette nicotine dependence in remission    Chronic cough    Non-seasonal allergic rhinitis due to pollen    Ductal carcinoma in situ (DCIS) of right breast     Past Medical History:   Diagnosis Date    Cancer [facility])     vocal cord cancer, s/p radiation (2008)    Chronic kidney disease     COPD (chronic obstructive pulmonary disease) ([facility] [address] )     History of radiation therapy 2008    Hyperlipidemia     Hypertension     Hypothyroidism     Renal cyst      Past Surgical History:   Procedure Laterality Date    BREAST BIOPSY Left 06/22/2012 in situ    BREAST BIOPSY Right 2022    stereo    BREAST LUMPECTOMY Left 2012    Left breast ADH/ALH    CHOLECYSTECTOMY      COLONOSCOPY      MAMMO STEREOTACTIC BREAST BIOPSY RIGHT (ALL INC) Right 2022    IN LARYNGOSCOPY,DIRECT,SCOPE,INJ CORDS Left 2019    Procedure: MICROLARYNGOSCOPY AND EXCISION OF LEFT VOCAL CORD LESION;  Surgeon: Roscoe Thomas MD;  [facility];  Service: ENT    SKIN CANCER EXCISION  2011    Squamous cell    TONSILLECTOMY       Family History   Problem Relation Age of Onset    Heart disease Mother     Emphysema Mother     Diabetes Mother     Heart disease Father     No Known Problems Daughter     No Known Problems Maternal Grandmother     COPD Maternal Grandfather     No Known Problems Paternal Grandmother     Stroke Paternal Grandfather     No Known Problems Son     No Known Problems Maternal Aunt     No Known Problems Paternal Aunt     No Known Problems Paternal Aunt     No Known Problems Paternal Aunt      Social History     Socioeconomic History    Marital status: /Civil Union     Spouse name: Not on file    Number of children: Not on file    Years of education: Not on file    Highest education level: Not on file   Occupational History    Not on file   Tobacco Use    Smoking status: Former Smoker     Packs/day: 1 00     Years: 49 00     Pack years: 49 00     Types: Cigarettes     Start date:      Quit date: 2008     Years since quittin 1    Smokeless tobacco: Never Used   Vaping Use    Vaping Use: Never used   Substance and Sexual Activity    Alcohol use: Yes     Comment: socially    Drug use: No    Sexual activity: Not Currently   Other Topics Concern    Not on file   Social History Narrative    Drinks 2 cups of coffee a day      Social Determinants of Health     Financial Resource Strain: Not on file   Food Insecurity: Not on file   Transportation Needs: Not on file   Physical Activity: Not on file Stress: Not on file   Social Connections: Not on file   Intimate Partner Violence: Not on file   Housing Stability: Not on file       Current Outpatient Medications:     albuterol (2 5 mg/3 mL) 0 083 % nebulizer solution, Take 3 mL (2 5 mg total) by nebulization every 6 (six) hours as needed for wheezing or shortness of breath, Disp: 360 mL, Rfl: 1    albuterol (Ventolin HFA) 90 mcg/act inhaler, Inhale 2 puffs every 6 (six) hours as needed for wheezing, Disp: 18 g, Rfl: 5    Calcium Carbonate-Vitamin D3 600-400 MG-UNIT TABS, Take 1 tablet by mouth daily with dinner , Disp: , Rfl:     Coenzyme Q10 (CO Q 10) 100 MG CAPS, Take 1 capsule by mouth daily with lunch , Disp: , Rfl:     diltiazem (Dilt-XR) 240 MG 24 hr capsule, Take 1 capsule by mouth daily with dinner  , Disp: , Rfl:     fexofenadine (ALLEGRA) 180 MG tablet, Take 1 tablet by mouth daily in the early morning , Disp: , Rfl:     fluticasone (FLONASE) 50 mcg/act nasal spray, 1 spray into each nostril 2 (two) times a day, Disp: 18 2 mL, Rfl: 6    fluticasone-umeclidinium-vilanterol (Trelegy Ellipta) 200-62 5-25 MCG/INH AEPB inhaler, Inhale 1 puff daily Rinse mouth after use , Disp: 60 blister, Rfl: 5    guaiFENesin (MUCINEX) 600 mg 12 hr tablet, Take 1,200 mg by mouth every 12 (twelve) hours, Disp: , Rfl:     hydrochlorothiazide (HYDRODIURIL) 25 mg tablet, Take 1 tablet by mouth daily in the early morning , Disp: , Rfl:     lisinopril (ZESTRIL) 5 mg tablet, Take 1 tablet by mouth daily in the early morning , Disp: , Rfl:     montelukast (SINGULAIR) 10 mg tablet, Take 1 tablet (10 mg total) by mouth daily, Disp: 90 tablet, Rfl: 3    Multiple Vitamins-Minerals (MULTI FOR HER PO), Take 1 tablet by mouth daily with breakfast, Disp: , Rfl:     rosuvastatin (CRESTOR) 10 MG tablet, Take 10 mg by mouth daily, Disp: , Rfl:     SUPER B COMPLEX/C CAPS, Take 1 capsule by mouth daily with dinner , Disp: , Rfl:     levothyroxine 112 mcg tablet, Take 112 mcg by mouth daily in the early morning , Disp: , Rfl:   Allergies   Allergen Reactions    Pollen Extract Allergic Rhinitis and Cough       Physical Exam:     Vitals:    02/24/22 1401   BP: 120/84   Pulse: 97   Resp: 18   Temp: 97 9 °F (36 6 °C)   SpO2: 94%     Physical Exam  Vitals reviewed  Constitutional:       Appearance: She is well-developed  HENT:      Head: Normocephalic and atraumatic  Eyes:      Pupils: Pupils are equal, round, and reactive to light  Neck:      Thyroid: No thyromegaly  Vascular: No JVD  Trachea: No tracheal deviation  Cardiovascular:      Rate and Rhythm: Normal rate and regular rhythm  Heart sounds: Normal heart sounds  No murmur heard  No friction rub  No gallop  Pulmonary:      Effort: Pulmonary effort is normal  No respiratory distress  Breath sounds: Normal breath sounds  No wheezing or rales  Chest:   Breasts: Breasts are symmetrical       Right: No inverted nipple, mass, nipple discharge, skin change or tenderness  Left: No inverted nipple, mass, nipple discharge, skin change or tenderness  Comments: Both breasts were examined in the sitting and supine position  There are no worrisome skin lesions, no nipple retraction and no nipple discharge  There are no dominant masses, axillary adenopathy or supraclavicular adenopathy on either side  Abdominal:      General: There is no distension  Palpations: Abdomen is soft  There is no hepatomegaly or mass  Tenderness: There is no abdominal tenderness  There is no guarding or rebound  Musculoskeletal:         General: No tenderness  Normal range of motion  Cervical back: Normal range of motion and neck supple  Lymphadenopathy:      Cervical: No cervical adenopathy  Skin:     General: Skin is warm and dry  Findings: No erythema or rash  Neurological:      Mental Status: She is alert and oriented to person, place, and time        Cranial Nerves: No cranial nerve deficit  Psychiatric:         Behavior: Behavior normal            Results & Discussion:   Patient has new diagnosis of right breast ductal carcinoma in Situ  I have recommended breast conservation therapy  We talked about a LIBBY directed lumpectomy  The patient understands that radiation might be recommended  She is not in favor that nor and anti hormonal pill based on their side effects  We talked about possibly performing a prelude decision test postoperatively to help delineate the relative merits of radiation therapy  Further discussions will be had in her postoperative visit regarding adjuvant therapies  All questions were answered the patient's satisfaction  The patient and I underwent the process of consent for right breast LIBBY  directed lumpectomy  The complications outlined on the consent including relatively minor problems (wound infection, wound healing problems, hematomas, scarring, chronic discomfort/pain), moderate problems (injury to nerves or blood vessels, allergic reactions) and major complications (extensive blood loss requiring transfusions or possible addtional surgeries, cardiac arrest, stroke and possible death)  We also reviewed specific complications as outlined on the consent form including possible need for adjuvant therapies or additional surgeries  All questions were answered to the patient's satisfaction  We will coordinate the surgery at our next mutual convience  Advance Care Planning/Advance Directives:  I discussed the disease status, treatment plans and follow-up with the patient

## 2022-02-28 NOTE — PROGRESS NOTES
Call placed to patient regarding recommendation for;    __X___ RIGHT ______LEFT      __X___SAVI  placement  Procedure explained to patient, additional questions answered at this time    __X___Verbalized understanding        Blood thinners:  _____yes __X___no    Date stopped: ___N/A____    All teaching points discussed during call, pt with no questions at this time, pt adv to arrive at 1430  for 1500 insertion    Pt given name/# for any further questions/needs

## 2022-03-03 ENCOUNTER — OFFICE VISIT (OUTPATIENT)
Dept: LAB | Facility: CLINIC | Age: 77
End: 2022-03-03
Payer: COMMERCIAL

## 2022-03-03 ENCOUNTER — HOSPITAL ENCOUNTER (OUTPATIENT)
Dept: RADIOLOGY | Facility: HOSPITAL | Age: 77
Discharge: HOME | End: 2022-03-03
Attending: SURGERY
Payer: COMMERCIAL

## 2022-03-03 ENCOUNTER — APPOINTMENT (OUTPATIENT)
Dept: LAB | Facility: CLINIC | Age: 77
End: 2022-03-03
Payer: COMMERCIAL

## 2022-03-03 ENCOUNTER — TELEPHONE (OUTPATIENT)
Dept: NEPHROLOGY | Facility: CLINIC | Age: 77
End: 2022-03-03

## 2022-03-03 DIAGNOSIS — Z01.818 PREOPERATIVE TESTING: ICD-10-CM

## 2022-03-03 DIAGNOSIS — D05.11 DUCTAL CARCINOMA IN SITU (DCIS) OF RIGHT BREAST: ICD-10-CM

## 2022-03-03 DIAGNOSIS — N18.32 STAGE 3B CHRONIC KIDNEY DISEASE (HCC): ICD-10-CM

## 2022-03-03 LAB
ALBUMIN SERPL BCP-MCNC: 3.5 G/DL (ref 3.5–5)
ALP SERPL-CCNC: 92 U/L (ref 46–116)
ALT SERPL W P-5'-P-CCNC: 27 U/L (ref 12–78)
ANION GAP SERPL CALCULATED.3IONS-SCNC: 10 MMOL/L (ref 4–13)
AST SERPL W P-5'-P-CCNC: 19 U/L (ref 5–45)
BASOPHILS # BLD AUTO: 0.08 THOUSANDS/ΜL (ref 0–0.1)
BASOPHILS NFR BLD AUTO: 1 % (ref 0–1)
BILIRUB SERPL-MCNC: 0.56 MG/DL (ref 0.2–1)
BUN SERPL-MCNC: 15 MG/DL (ref 5–25)
CALCIUM SERPL-MCNC: 9.3 MG/DL (ref 8.3–10.1)
CHLORIDE SERPL-SCNC: 97 MMOL/L (ref 100–108)
CO2 SERPL-SCNC: 30 MMOL/L (ref 21–32)
CREAT SERPL-MCNC: 1.09 MG/DL (ref 0.6–1.3)
EOSINOPHIL # BLD AUTO: 0.22 THOUSAND/ΜL (ref 0–0.61)
EOSINOPHIL NFR BLD AUTO: 3 % (ref 0–6)
ERYTHROCYTE [DISTWIDTH] IN BLOOD BY AUTOMATED COUNT: 12.6 % (ref 11.6–15.1)
GFR SERPL CREATININE-BSD FRML MDRD: 49 ML/MIN/1.73SQ M
GLUCOSE SERPL-MCNC: 72 MG/DL (ref 65–140)
HCT VFR BLD AUTO: 44.3 % (ref 34.8–46.1)
HGB BLD-MCNC: 14.6 G/DL (ref 11.5–15.4)
IMM GRANULOCYTES # BLD AUTO: 0.05 THOUSAND/UL (ref 0–0.2)
IMM GRANULOCYTES NFR BLD AUTO: 1 % (ref 0–2)
LYMPHOCYTES # BLD AUTO: 1.26 THOUSANDS/ΜL (ref 0.6–4.47)
LYMPHOCYTES NFR BLD AUTO: 15 % (ref 14–44)
MAGNESIUM SERPL-MCNC: 2.3 MG/DL (ref 1.6–2.6)
MCH RBC QN AUTO: 31.6 PG (ref 26.8–34.3)
MCHC RBC AUTO-ENTMCNC: 33 G/DL (ref 31.4–37.4)
MCV RBC AUTO: 96 FL (ref 82–98)
MONOCYTES # BLD AUTO: 0.72 THOUSAND/ΜL (ref 0.17–1.22)
MONOCYTES NFR BLD AUTO: 9 % (ref 4–12)
NEUTROPHILS # BLD AUTO: 6.13 THOUSANDS/ΜL (ref 1.85–7.62)
NEUTS SEG NFR BLD AUTO: 71 % (ref 43–75)
NRBC BLD AUTO-RTO: 0 /100 WBCS
PLATELET # BLD AUTO: 279 THOUSANDS/UL (ref 149–390)
PMV BLD AUTO: 9.2 FL (ref 8.9–12.7)
POTASSIUM SERPL-SCNC: 3.7 MMOL/L (ref 3.5–5.3)
PROT SERPL-MCNC: 7.1 G/DL (ref 6.4–8.2)
RBC # BLD AUTO: 4.62 MILLION/UL (ref 3.81–5.12)
SODIUM SERPL-SCNC: 137 MMOL/L (ref 136–145)
WBC # BLD AUTO: 8.46 THOUSAND/UL (ref 4.31–10.16)

## 2022-03-03 PROCEDURE — 36415 COLL VENOUS BLD VENIPUNCTURE: CPT

## 2022-03-03 PROCEDURE — 80053 COMPREHEN METABOLIC PANEL: CPT

## 2022-03-03 PROCEDURE — 71046 X-RAY EXAM CHEST 2 VIEWS: CPT

## 2022-03-03 PROCEDURE — 85025 COMPLETE CBC W/AUTO DIFF WBC: CPT

## 2022-03-03 PROCEDURE — 83735 ASSAY OF MAGNESIUM: CPT

## 2022-03-03 PROCEDURE — 93005 ELECTROCARDIOGRAM TRACING: CPT

## 2022-03-03 NOTE — TELEPHONE ENCOUNTER
----- Message from Fabrice sent at 3/3/2022  4:15 PM EST -----  Labs reviewed  Patient is followed by Dr Tony Renae  Labs are stable  Kidney function is very stable at this time and creatinine is at the lower end of baseline which is good  No changes at this time    Follow-up as previously discussed between her and Dr Tony Renae

## 2022-03-04 LAB
ATRIAL RATE: 76 BPM
P AXIS: 72 DEGREES
PR INTERVAL: 184 MS
QRS AXIS: 78 DEGREES
QRSD INTERVAL: 84 MS
QT INTERVAL: 386 MS
QTC INTERVAL: 434 MS
T WAVE AXIS: 71 DEGREES
VENTRICULAR RATE: 76 BPM

## 2022-03-04 PROCEDURE — 93010 ELECTROCARDIOGRAM REPORT: CPT | Performed by: INTERNAL MEDICINE

## 2022-03-09 ENCOUNTER — TELEPHONE (OUTPATIENT)
Dept: SURGICAL ONCOLOGY | Facility: CLINIC | Age: 77
End: 2022-03-09

## 2022-03-09 NOTE — TELEPHONE ENCOUNTER
PATIENT ASKED ME TO CANCEL APPT WITH [facility] BECAUSE SHE IS SEEING DR Rocío Castillo AND IS HAVING A PROCEDURE DONE IN A FEW WEEKS

## 2022-03-16 ENCOUNTER — HOSPITAL ENCOUNTER (OUTPATIENT)
Dept: MAMMOGRAPHY | Facility: CLINIC | Age: 77
Discharge: HOME | End: 2022-03-16
Admitting: RADIOLOGY
Payer: COMMERCIAL

## 2022-03-16 VITALS
HEIGHT: 63 IN | DIASTOLIC BLOOD PRESSURE: 70 MMHG | HEART RATE: 68 BPM | BODY MASS INDEX: 32.15 KG/M2 | SYSTOLIC BLOOD PRESSURE: 119 MMHG

## 2022-03-16 DIAGNOSIS — D05.11 DUCTAL CARCINOMA IN SITU (DCIS) OF RIGHT BREAST: ICD-10-CM

## 2022-03-16 PROCEDURE — A4648 IMPLANTABLE TISSUE MARKER: HCPCS

## 2022-03-16 PROCEDURE — 19281 PERQ DEVICE BREAST 1ST IMAG: CPT

## 2022-03-16 RX ORDER — LIDOCAINE HYDROCHLORIDE 10 MG/ML
5 INJECTION, SOLUTION EPIDURAL; INFILTRATION; INTRACAUDAL; PERINEURAL ONCE
Status: COMPLETED | OUTPATIENT
Start: 2022-03-16 | End: 2022-03-16

## 2022-03-16 RX ADMIN — LIDOCAINE HYDROCHLORIDE 5 ML: 10 INJECTION, SOLUTION EPIDURAL; INFILTRATION; INTRACAUDAL; PERINEURAL at 15:20

## 2022-03-16 NOTE — DISCHARGE INSTR - OTHER ORDERS
POST LARGE CORE BREAST BIOPSY PATIENT INFORMATION      Place an ice pack inside your bra over the top of the dressing every hour for 20 minutes (20 minutes on, 60 minutes off)  Do this until bedtime  Do not shower or bathe until the following morning  After bathing, you may remove the bandaid  You may bathe your breast carefully with the steri-strips in place  Be careful not to loosen them  The steri-strips will fall off in 3-5 days  You may have mild discomfort, and you may have some bruising where the needle entered the skin  This should clear within 5-7 days  If you need medicine for discomfort, take acetaminophen products such as Tylenol  You may also take Advil or Motrin products  DO NOT use Aspirin for the first 24 hours  Do not participate in strenuous activities such as-tennis, aerobics, weight lifting, etc  for 24 hours  Refrain from swimming/soaking for 72 hours  Wearing a bra for sleeping may be more comfortable for the first 24-48 hours after your biopsy  Watch for continued bleeding, pain or fever over 101  If any of these symptoms occur, please contact our breast nurse navigator at the location where your biopsy was performed  During normal business hours (7:30am - 4:00pm) please call the nurse navigator at the site     where your procedure was performed:       [address]: [phone] or [phone] [address]: [phone] or [phone]     [address]: [address] [facility]: [address] [facility] [address]: [phone]        After 4pm - please call your physician or go to the nearest Emergency Department location  The final results of your biopsy are usually available within one week

## 2022-03-16 NOTE — PROGRESS NOTES
Procedure type:    _____ultrasound guided __x___mammo guided    Breast:    _____Left __x___Right    Location: 10 oclock    Needle: 16g Maricel     # of passes: 1    Clip: 5 cm Purvi    Performed by: Dr Liz Helton held for 5 minutes by: Key Berg    Steralanna Strips:    _____yes _x____no    Band aid:    __x___yes_____no    Tape and guaze:    _____yes __x___no    Tolerated procedure:    __x___yes _____no

## 2022-03-17 NOTE — PROGRESS NOTES
Post procedure call completed on 3/17/22 at 1048    Bleeding: _____yes __X___no (pt denies)    Pain: _____yes ___X___no (pt denies)    Redness/Swelling: ______yes ___X___no (pt denies)    Band aid removed: _____yes __X___no (she will remove when she showers)    Pt with no questions at this time, adv to call with any questions or concerns, has name/# for contact

## 2022-03-18 ENCOUNTER — ANESTHESIA EVENT (OUTPATIENT)
Dept: PERIOP | Facility: HOSPITAL | Age: 77
End: 2022-03-18
Payer: COMMERCIAL

## 2022-03-18 NOTE — PRE-PROCEDURE INSTRUCTIONS
Pre-Surgery Instructions:   Medication Instructions    albuterol (2 5 mg/3 mL) 0 083 % nebulizer solution Instructed patient per Anesthesia Guidelines  Use morning of surgery if needed    albuterol (Ventolin HFA) 90 mcg/act inhaler Instructed patient per Anesthesia Guidelines  Use morning of surgery if needed    Calcium Carbonate-Vitamin D3 600-400 MG-UNIT TABS Instructed patient per Anesthesia Guidelines  Stopped 3/15  Do not take morning of surgery    Coenzyme Q10 (CO Q 10) 100 MG CAPS Instructed patient per Anesthesia Guidelines  Stopped 3/15  Do not take morning of surgery    diltiazem (Dilt-XR) 240 MG 24 hr capsule Instructed patient per Anesthesia Guidelines  Takes in the evening  Do not take morning of surgery    fexofenadine (ALLEGRA) 180 MG tablet Instructed patient per Anesthesia Guidelines  Take morning of surgery with small sip of water    fluticasone (FLONASE) 50 mcg/act nasal spray Patient was instructed by Physician and understands   fluticasone-umeclidinium-vilanterol (Trelegy Ellipta) 200-62 5-25 MCG/INH AEPB inhaler Instructed patient per Anesthesia Guidelines  Use morning of surgery    guaiFENesin (MUCINEX) 600 mg 12 hr tablet Patient was instructed by Physician and understands   hydrochlorothiazide (HYDRODIURIL) 25 mg tablet Instructed patient per Anesthesia Guidelines  Do not take morning of surgery    levothyroxine 112 mcg tablet Instructed patient per Anesthesia Guidelines  Take morning of surgery with small sip of water    lisinopril (ZESTRIL) 5 mg tablet Instructed patient per Anesthesia Guidelines  Do not take morning of surgery    montelukast (SINGULAIR) 10 mg tablet Instructed patient per Anesthesia Guidelines  Takes in the evening  Do not take morning of surgery    Multiple Vitamins-Minerals (MULTI FOR HER PO) Instructed patient per Anesthesia Guidelines  Stopped 3/15  Do not take morning of surgery    oxyCODONE-acetaminophen (Percocet) 5-325 mg per tablet For post-op pain  Do not take morning of surgery    rosuvastatin (CRESTOR) 10 MG tablet Instructed patient per Anesthesia Guidelines  Takes in the evening  Do not take morning of surgery    SUPER B COMPLEX/C CAPS Instructed patient per Anesthesia Guidelines  Stopped 3/15  Do not take morning of surgery   Covid screening negative as per patient  Fully vaccinated  Reviewed showering and medication instructions  Patient verbalized understanding  Advised NPO after MN and ASC will call with scheduled surgical time

## 2022-03-22 ENCOUNTER — HOSPITAL ENCOUNTER (OUTPATIENT)
Facility: HOSPITAL | Age: 77
Setting detail: OUTPATIENT SURGERY
Discharge: HOME | End: 2022-03-22
Attending: SURGERY | Admitting: SURGERY
Payer: COMMERCIAL

## 2022-03-22 ENCOUNTER — ANESTHESIA (OUTPATIENT)
Dept: PERIOP | Facility: HOSPITAL | Age: 77
End: 2022-03-22
Payer: COMMERCIAL

## 2022-03-22 ENCOUNTER — APPOINTMENT (OUTPATIENT)
Dept: MAMMOGRAPHY | Facility: HOSPITAL | Age: 77
End: 2022-03-22
Payer: COMMERCIAL

## 2022-03-22 VITALS
DIASTOLIC BLOOD PRESSURE: 73 MMHG | TEMPERATURE: 97 F | SYSTOLIC BLOOD PRESSURE: 159 MMHG | HEART RATE: 80 BPM | OXYGEN SATURATION: 96 % | RESPIRATION RATE: 20 BRPM

## 2022-03-22 DIAGNOSIS — D05.11 DUCTAL CARCINOMA IN SITU (DCIS) OF RIGHT BREAST: ICD-10-CM

## 2022-03-22 PROBLEM — R73.03 PREDIABETES: Status: ACTIVE | Noted: 2019-04-19

## 2022-03-22 PROBLEM — Z85.3 HISTORY OF BREAST CANCER: Status: ACTIVE | Noted: 2018-12-06

## 2022-03-22 PROBLEM — I87.8 VENOUS STASIS OF LOWER EXTREMITY: Status: ACTIVE | Noted: 2018-04-18

## 2022-03-22 PROCEDURE — 76098 X-RAY EXAM SURGICAL SPECIMEN: CPT | Performed by: SURGERY

## 2022-03-22 PROCEDURE — 88307 TISSUE EXAM BY PATHOLOGIST: CPT | Performed by: PATHOLOGY

## 2022-03-22 PROCEDURE — 88342 IMHCHEM/IMCYTCHM 1ST ANTB: CPT | Performed by: PATHOLOGY

## 2022-03-22 PROCEDURE — 88341 IMHCHEM/IMCYTCHM EA ADD ANTB: CPT | Performed by: PATHOLOGY

## 2022-03-22 PROCEDURE — 19301 PARTIAL MASTECTOMY: CPT | Performed by: SURGERY

## 2022-03-22 RX ORDER — HYDROMORPHONE HCL/PF 1 MG/ML
0.25 SYRINGE (ML) INJECTION
Status: DISCONTINUED | OUTPATIENT
Start: 2022-03-22 | End: 2022-03-22 | Stop reason: HOSPADM

## 2022-03-22 RX ORDER — CEFAZOLIN SODIUM 2 G/50ML
2000 SOLUTION INTRAVENOUS ONCE
Status: COMPLETED | OUTPATIENT
Start: 2022-03-22 | End: 2022-03-22

## 2022-03-22 RX ORDER — SODIUM CHLORIDE, SODIUM LACTATE, POTASSIUM CHLORIDE, CALCIUM CHLORIDE 600; 310; 30; 20 MG/100ML; MG/100ML; MG/100ML; MG/100ML
INJECTION, SOLUTION INTRAVENOUS CONTINUOUS PRN
Status: DISCONTINUED | OUTPATIENT
Start: 2022-03-22 | End: 2022-03-22

## 2022-03-22 RX ORDER — ONDANSETRON 2 MG/ML
4 INJECTION INTRAMUSCULAR; INTRAVENOUS EVERY 4 HOURS PRN
Status: DISCONTINUED | OUTPATIENT
Start: 2022-03-22 | End: 2022-03-22 | Stop reason: HOSPADM

## 2022-03-22 RX ORDER — FENTANYL CITRATE 50 UG/ML
INJECTION, SOLUTION INTRAMUSCULAR; INTRAVENOUS AS NEEDED
Status: DISCONTINUED | OUTPATIENT
Start: 2022-03-22 | End: 2022-03-22

## 2022-03-22 RX ORDER — DEXAMETHASONE SODIUM PHOSPHATE 10 MG/ML
INJECTION, SOLUTION INTRAMUSCULAR; INTRAVENOUS AS NEEDED
Status: DISCONTINUED | OUTPATIENT
Start: 2022-03-22 | End: 2022-03-22

## 2022-03-22 RX ORDER — ONDANSETRON 2 MG/ML
INJECTION INTRAMUSCULAR; INTRAVENOUS AS NEEDED
Status: DISCONTINUED | OUTPATIENT
Start: 2022-03-22 | End: 2022-03-22

## 2022-03-22 RX ORDER — BUPIVACAINE HYDROCHLORIDE AND EPINEPHRINE 2.5; 5 MG/ML; UG/ML
INJECTION, SOLUTION INFILTRATION; PERINEURAL AS NEEDED
Status: DISCONTINUED | OUTPATIENT
Start: 2022-03-22 | End: 2022-03-22 | Stop reason: HOSPADM

## 2022-03-22 RX ORDER — FENTANYL CITRATE/PF 50 MCG/ML
25 SYRINGE (ML) INJECTION
Status: DISCONTINUED | OUTPATIENT
Start: 2022-03-22 | End: 2022-03-22 | Stop reason: HOSPADM

## 2022-03-22 RX ORDER — OXYCODONE HYDROCHLORIDE AND ACETAMINOPHEN 5; 325 MG/1; MG/1
1 TABLET ORAL EVERY 4 HOURS PRN
Status: DISCONTINUED | OUTPATIENT
Start: 2022-03-22 | End: 2022-03-22 | Stop reason: HOSPADM

## 2022-03-22 RX ORDER — LIDOCAINE HYDROCHLORIDE 10 MG/ML
INJECTION, SOLUTION EPIDURAL; INFILTRATION; INTRACAUDAL; PERINEURAL AS NEEDED
Status: DISCONTINUED | OUTPATIENT
Start: 2022-03-22 | End: 2022-03-22

## 2022-03-22 RX ORDER — DIPHENHYDRAMINE HYDROCHLORIDE 50 MG/ML
12.5 INJECTION INTRAMUSCULAR; INTRAVENOUS ONCE AS NEEDED
Status: DISCONTINUED | OUTPATIENT
Start: 2022-03-22 | End: 2022-03-22 | Stop reason: HOSPADM

## 2022-03-22 RX ORDER — PROPOFOL 10 MG/ML
INJECTION, EMULSION INTRAVENOUS AS NEEDED
Status: DISCONTINUED | OUTPATIENT
Start: 2022-03-22 | End: 2022-03-22

## 2022-03-22 RX ADMIN — DEXAMETHASONE SODIUM PHOSPHATE 10 MG: 10 INJECTION, SOLUTION INTRAMUSCULAR; INTRAVENOUS at 09:15

## 2022-03-22 RX ADMIN — FENTANYL CITRATE 25 MCG: 50 INJECTION, SOLUTION INTRAMUSCULAR; INTRAVENOUS at 09:24

## 2022-03-22 RX ADMIN — SODIUM CHLORIDE, SODIUM LACTATE, POTASSIUM CHLORIDE, AND CALCIUM CHLORIDE: .6; .31; .03; .02 INJECTION, SOLUTION INTRAVENOUS at 08:45

## 2022-03-22 RX ADMIN — CEFAZOLIN SODIUM 2000 MG: 2 SOLUTION INTRAVENOUS at 09:07

## 2022-03-22 RX ADMIN — FENTANYL CITRATE 25 MCG: 50 INJECTION, SOLUTION INTRAMUSCULAR; INTRAVENOUS at 09:53

## 2022-03-22 RX ADMIN — LIDOCAINE HYDROCHLORIDE 50 MG: 10 INJECTION, SOLUTION EPIDURAL; INFILTRATION; INTRACAUDAL at 09:10

## 2022-03-22 RX ADMIN — PROPOFOL 170 MG: 10 INJECTION, EMULSION INTRAVENOUS at 09:10

## 2022-03-22 RX ADMIN — ONDANSETRON 4 MG: 2 INJECTION INTRAMUSCULAR; INTRAVENOUS at 09:15

## 2022-03-22 NOTE — ANESTHESIA POSTPROCEDURE EVALUATION
Post-Op Assessment Note    CV Status:  Stable  Pain Score: 0    Pain management: adequate     Mental Status:  Alert and awake   Hydration Status:  Euvolemic   PONV Controlled:  Controlled   Airway Patency:  Patent      Post Op Vitals Reviewed: Yes      Staff: CRNA         No complications documented      BP   124/62   Temp      Pulse  75   Resp   16   SpO2   100

## 2022-03-22 NOTE — INTERVAL H&P NOTE
H&P reviewed  After examining the patient I find no changes in the patients condition since the H&P had been written  Save detected in holding area      Vitals:    03/22/22 0803   BP: 120/58   Pulse: 77   Resp: 18   SpO2: 96%

## 2022-03-22 NOTE — DISCHARGE INSTRUCTIONS
POST-OPERATIVE BREAST CARE INSTRUCTIONS    Wound Closure/Dressing: Your wound is closed with:   Steri strips-white pieces of tape that hold your incision together along with surgical glue           Dissolvable sutures-underneath the skin    Wound care:     If you have gauze over your wound you may remove it the day after surgery  Leave the Steri-strips on, they will fall off on their own in 1-2 weeks   You may shower using soap and water to clean your wound  Gently pat dry  Drain Care: If you do not have a drain, disregard this section   Visiting Nursing should have been arranged upon discharge from hospital   A nurse will call your home to schedule an initial visit  Please call the number below if you have not received a call within 48 hours of hospital discharge   You may shower with the ANTHONY drains  Wash area around the drains with soap and water and pat dry   Record drain outputs on chart given to you at pre op visit and bring that chart to office at post op appt   ANTHONY drains can be removed in the office by a nurse either at post op visit OR when drain output is 30 ccs or less in a 24 hour period for three days in a row   If you had plastic surgery or reconstructive surgery, the plastic surgeon will manage the drains  Other:       You can begin wearing your own bra when it feels comfortable   You may resume using deodorant the day after surgery                 Post-op visit:  your post-op visit is scheduled for:  2022 @ 11:30 AM    Call our office at [phone] if you have any  of the followin  Redness, swelling, heat, unusual drainage or heavy bleeding from wound  2  Fever greater than 101 °  3  Pain not relieved by medication

## 2022-03-22 NOTE — OP NOTE
OPERATIVE REPORT  PATIENT NAME: Gary Cerrato    :  1945  MRN: [id number]  Pt Location: [facility] OR ROOM 03    SURGERY DATE: 3/22/2022    Surgeon(s) and Role:     * Lianne Sauceda MD - Primary     * Jarrett Friedman MD - Assisting    Preop Diagnosis:  Ductal carcinoma in situ (DCIS) of right breast [D05 11]    Post-Op Diagnosis Codes:     * Ductal carcinoma in situ (DCIS) of right breast [D05 11]    Procedure(s) (LRB):  BREAST LIBBY  DIRECTED LUMPECTOMY (Right)    Specimen(s):  ID Type Source Tests Collected by Time Destination   1 : Right Breast Lumpectomy marked per protocol Tissue Breast, Right TISSUE EXAM Lianne Sauceda MD 3/22/2022 7850    2 : Right Breast Lumpectomy Anterior Margin Green Tissue Breast, Right TISSUE Favioal Wade MD 3/22/2022 3687    3 : Right Breast Lumpectomy Inferiro Margin Blue Tissue Breast, Right TISSUE Faviola Wade MD 3/22/2022 9317    4 : Right Breast Lumpectomy Medial Margin Yellow Tissue Breast, Right TISSUE Faviola Wade MD 3/22/2022 9503    5 : Right Breast Lumpectomy Posterior Margin Black Tissue Breast, Right TISSUE Faviola Wade MD 3/22/2022 9895    6 : Right Breast Lumpectomy Superior Margin Orange Tissue Breast, Right TISSUE Faviola Wade MD 3/22/2022 5058    7 : Right Breast Lumpectomy Lateral Margin Red Tissue Breast, Right TISSUE Faviola Wade MD 3/22/2022 1694        Estimated Blood Loss:   Minimal    Drains:  * No LDAs found *    Anesthesia Type:   General    Operative Indications:  Ductal carcinoma in situ (DCIS) of right breast [D05 11]      Operative Findings:  Good specimen radiograph    Complications:   None    Procedure and Technique:  I previously reviewed the post biopsy images  Lumpectomy  The patient was brought to the operation room and placed under general anesthesia  Attention was paid to ensure appropriate padding and correct positioning    The LIBBY  detector was then used to locate the position of the LIBBY deflector and the skin was marked in this area  The right breast was prepped and draped in a sterile fashion  I initiated a time-out, identifying the patient, the correct side and the above procedure  All parties agreed with the time out  The planned incision was then injected with 0 25% Marcaine for local anesthesia  The incision was sharply incised  The LIBBY dectector was used to guide the dissection  Superior, inferior, medial and lateral planes were developed around the LIBBY deflector and the specimen truncated with electrocautery beyond the deflector  The specimen was then inked for orientation purposes  A specimen radiograph was taken in two dimensions  Additional margins were removed to optimize complete removal of the tumor  Lateral was skin and an additional new lateral region as a new margin  The additional margins were inked on the most distal area  All specimens were sent to pathology for permanent analysis  Superficial bleeding controlled with electrocautery and the wound was extensively irrigated  The wound was closed with two layers, an inner layer of 3-0 vicryl and a subcuticular layer of 4-0 monocryl  Steri-strips were applied  The counts were correct x 2     I was present for the entire procedure    Patient Disposition:  PACU       SIGNATURE: Zina Hernández MD  DATE: March 22, 2022  TIME: 9:56 AM

## 2022-03-22 NOTE — ANESTHESIA PREPROCEDURE EVALUATION
Procedure:  BREAST LIBBY  DIRECTED LUMPECTOMY (Right Breast)    Relevant Problems   CARDIO   (+) Essential hypertension   (+) Hypercholesteremia   (+) Mixed hyperlipidemia      ENDO   (+) Hypothyroidism      /RENAL   (+) JARETT (acute kidney injury) (HCC)   (+) CKD (chronic kidney disease) stage 3, GFR 30-59 ml/min (HCC)   (+) Renal cyst, acquired, right      MUSCULOSKELETAL   (+) Sciatica      NEURO/PSYCH   (+) History of breast cancer      PULMONARY   (+) COPD, severe (HCC)   (+) Moderate persistent asthma without complication      Other   (+) Increased frequency of urination   (+) Lesion of vocal cord   (+) Nocturia   (+) Spinal stenosis of lumbar region without neurogenic claudication        Physical Exam    Airway    Mallampati score: II  TM Distance: <3 FB  Neck ROM: limited     Dental   upper dentures and lower dentures,     Cardiovascular      Pulmonary      Other Findings        Anesthesia Plan  ASA Score- 3     Anesthesia Type- general with ASA Monitors  Additional Monitors:   Airway Plan: LMA  Plan Factors-Exercise tolerance (METS): >4 METS  Chart reviewed  EKG reviewed  Imaging results reviewed  Existing labs reviewed  Patient summary reviewed  Patient is not a current smoker  Patient did not smoke on day of surgery  Induction- intravenous  Postoperative Plan- Plan for postoperative opioid use  Informed Consent- Anesthetic plan and risks discussed with patient  I personally reviewed this patient with the CRNA  Discussed and agreed on the Anesthesia Plan with the CRNA  Wendy Welch

## 2022-03-31 DIAGNOSIS — J44.9 COPD, SEVERE (HCC): ICD-10-CM

## 2022-03-31 DIAGNOSIS — J45.40 MODERATE PERSISTENT ASTHMA WITHOUT COMPLICATION: ICD-10-CM

## 2022-03-31 RX ORDER — FLUTICASONE PROPIONATE 50 MCG
SPRAY, SUSPENSION (ML) NASAL
Qty: 48 ML | Refills: 2 | Status: SHIPPED | OUTPATIENT
Start: 2022-03-31

## 2022-04-05 DIAGNOSIS — D05.11 DUCTAL CARCINOMA IN SITU (DCIS) OF RIGHT BREAST: Primary | ICD-10-CM

## 2022-04-05 PROBLEM — Z85.3 HISTORY OF BREAST CANCER: Status: RESOLVED | Noted: 2018-12-06 | Resolved: 2022-04-05

## 2022-04-06 ENCOUNTER — OFFICE VISIT (OUTPATIENT)
Dept: SURGICAL ONCOLOGY | Facility: CLINIC | Age: 77
End: 2022-04-06

## 2022-04-06 VITALS
OXYGEN SATURATION: 96 % | BODY MASS INDEX: 32.07 KG/M2 | SYSTOLIC BLOOD PRESSURE: 132 MMHG | TEMPERATURE: 97.2 F | DIASTOLIC BLOOD PRESSURE: 72 MMHG | HEART RATE: 84 BPM | RESPIRATION RATE: 16 BRPM | WEIGHT: 181 LBS | HEIGHT: 63 IN

## 2022-04-06 DIAGNOSIS — Z98.890 STATUS POST RIGHT BREAST LUMPECTOMY: ICD-10-CM

## 2022-04-06 DIAGNOSIS — D05.11 DUCTAL CARCINOMA IN SITU (DCIS) OF RIGHT BREAST: ICD-10-CM

## 2022-04-06 DIAGNOSIS — Z71.89 OTHER SPECIFIED COUNSELING: Primary | ICD-10-CM

## 2022-04-06 PROCEDURE — 99024 POSTOP FOLLOW-UP VISIT: CPT | Performed by: SURGERY

## 2022-04-06 NOTE — PROGRESS NOTES
Surgical Oncology Breast Post-Op       [address] [facility]  [facility]  [address] [place] [address]    Trent Mcgovern  [birth year]  [id number]  [address] [facility],[address] [facility]  [facility]  [address] [address]    Chief Complaint:   Melissa Souza is seen for a post-operative visit of her recent breast surgery  Oncology History:     Oncology History   Atypical ductal hyperplasia of left breast   7/27/2012 Surgery    Left lumpectomy  Atypical ductal hyperplasia  Atypical lobular hyperplasia  Negative for malignancy     Ductal carcinoma in situ (DCIS) of right breast   2/11/2022 Biopsy    Right breast stereotactic biopsy  10 o'clock, middle  Ductal carcinoma in situ  Grade 2  , [place] 90    Concordant  Malignancy may be locally multifocal, spanning 2 cm  US right axilla not performed  Left breast clear on 11/2021 mammo  3/22/2022 Surgery    Right breast LIBBY  directed lumpectomy  Ductal carcinoma in situ  Grade 2  1 4 cm  Margins negative  0/1 Regional lymph node  Stage 0     4/5/2022 Genomic Testing    DCISion RT ordered         Assessment & Plan:   Assessment/Plan    Patient presents for postoperative visit  She does not have any invasive cancer  We aborted prelude test to help delineate the benefit of radiation therapy  The patient prefers strongly not to have radiation therapy but I think the prelude test will help us inform her of her risk if we do not provide radiation therapy  I will contact her telephonically with the results  Well see her back in 3 months  Her wounds are healing well  History of Present Illness:   See Oncology History    Interval History:   See Assessment & Plan  Patient has no complaints referable to her surgery or her breast at the current time  Review of Systems:   Review of Systems   All other systems reviewed and are negative        Past Medical History:     Patient Active Problem List   Diagnosis    CKD (chronic kidney disease) stage 3, GFR 30-59 ml/min ([facility])    COPD, severe ([facility] )    Essential hypertension    Hypercholesteremia    Renal cyst, acquired, right    Squamous cell cancer of skin of buttock    Environmental allergies    Hypothyroidism    Atypical ductal hyperplasia of left breast    Mixed hyperlipidemia    Nocturia    Sciatica    Spinal stenosis of lumbar region without neurogenic claudication    Increased frequency of urination    Moderate persistent asthma without complication    Lesion of vocal cord    JARETT (acute kidney injury) ([place] [address] )    Cigarette nicotine dependence in remission    Chronic cough    Non-seasonal allergic rhinitis due to pollen    Ductal carcinoma in situ (DCIS) of right breast    Prediabetes    Venous stasis of lower extremity     Past Medical History:   Diagnosis Date    Asthma     Cancer ([place] [address] )     vocal cord cancer, s/p radiation (2008)    Chronic kidney disease     Colon polyp     COPD (chronic obstructive pulmonary disease) ([place] [address] )     Disease of thyroid gland     Environmental allergies     History of radiation therapy 2008    Hyperlipidemia     Hypertension     Hypothyroidism     Renal cyst      Past Surgical History:   Procedure Laterality Date    BREAST BIOPSY Left 06/22/2012    in situ    BREAST BIOPSY Right 02/11/2022    stereo    BREAST LUMPECTOMY Left 07/24/2012    Left breast ADH/ALH    BREAST LUMPECTOMY Right 3/22/2022    Procedure: BREAST LIBBY  DIRECTED LUMPECTOMY;  Surgeon: Isa Vegas MD;  [facility];  Service: Surgical Oncology    CHOLECYSTECTOMY      COLONOSCOPY      MAMMO NEEDLE LOCALIZATION LEFT (ALL INC) Right 3/16/2022    MAMMO STEREOTACTIC BREAST BIOPSY RIGHT (ALL INC) Right 02/11/2022    AR LARYNGOSCOPY,DIRECT,SCOPE,INJ CORDS Left 03/29/2019    Procedure: MICROLARYNGOSCOPY AND EXCISION OF LEFT VOCAL CORD LESION;  Surgeon: César Fournier MD;  [facility]; Service: ENT    SKIN CANCER EXCISION  2011    Squamous cell    TONSILLECTOMY       Family History   Problem Relation Age of Onset    Heart disease Mother     Emphysema Mother     Diabetes Mother     Heart disease Father     No Known Problems Daughter     No Known Problems Maternal Grandmother     COPD Maternal Grandfather     No Known Problems Paternal Grandmother     Stroke Paternal Grandfather     No Known Problems Son     No Known Problems Maternal Aunt     No Known Problems Paternal Aunt     No Known Problems Paternal Aunt     No Known Problems Paternal Aunt      Social History     Socioeconomic History    Marital status: /Civil Union     Spouse name: Not on file    Number of children: Not on file    Years of education: Not on file    Highest education level: Not on file   Occupational History    Not on file   Tobacco Use    Smoking status: Former Smoker     Packs/day: 1 00     Years: 49 00     Pack years: 49 00     Types: Cigarettes     Start date:      Quit date: 2008     Years since quittin 2    Smokeless tobacco: Never Used   Vaping Use    Vaping Use: Never used   Substance and Sexual Activity    Alcohol use: Yes     Comment: socially    Drug use: No    Sexual activity: Not Currently   Other Topics Concern    Not on file   Social History Narrative    Drinks 2 cups of coffee a day      Social Determinants of Health     Financial Resource Strain: Not on file   Food Insecurity: Not on file   Transportation Needs: Not on file   Physical Activity: Not on file   Stress: Not on file   Social Connections: Not on file   Intimate Partner Violence: Not on file   Housing Stability: Not on file       Current Outpatient Medications:     albuterol (2 5 mg/3 mL) 0 083 % nebulizer solution, Take 3 mL (2 5 mg total) by nebulization every 6 (six) hours as needed for wheezing or shortness of breath, Disp: 360 mL, Rfl: 1    albuterol (Ventolin HFA) 90 mcg/act inhaler, Inhale 2 puffs every 6 (six) hours as needed for wheezing, Disp: 18 g, Rfl: 5    Calcium Carbonate-Vitamin D3 600-400 MG-UNIT TABS, Take 1 tablet by mouth daily with dinner , Disp: , Rfl:     Coenzyme Q10 (CO Q 10) 100 MG CAPS, Take 1 capsule by mouth daily with lunch , Disp: , Rfl:     diltiazem (Dilt-XR) 240 MG 24 hr capsule, Take 1 capsule by mouth daily with dinner  , Disp: , Rfl:     fexofenadine (ALLEGRA) 180 MG tablet, Take 1 tablet by mouth daily in the early morning , Disp: , Rfl:     fluticasone (FLONASE) 50 mcg/act nasal spray, SPRAY 1 SPRAY INTO EACH NOSTRIL TWICE A DAY, Disp: 48 mL, Rfl: 2    fluticasone-umeclidinium-vilanterol (Trelegy Ellipta) 200-62 5-25 MCG/INH AEPB inhaler, Inhale 1 puff daily Rinse mouth after use , Disp: 60 blister, Rfl: 5    guaiFENesin (MUCINEX) 600 mg 12 hr tablet, Take 1,200 mg by mouth every 12 (twelve) hours, Disp: , Rfl:     hydrochlorothiazide (HYDRODIURIL) 25 mg tablet, Take 1 tablet by mouth daily in the early morning Taking every other day , Disp: , Rfl:     levothyroxine 112 mcg tablet, Take 112 mcg by mouth daily in the early morning , Disp: , Rfl:     lisinopril (ZESTRIL) 5 mg tablet, Take 1 tablet by mouth daily in the early morning , Disp: , Rfl:     montelukast (SINGULAIR) 10 mg tablet, Take 1 tablet (10 mg total) by mouth daily, Disp: 90 tablet, Rfl: 3    Multiple Vitamins-Minerals (MULTI FOR HER PO), Take 1 tablet by mouth daily with breakfast, Disp: , Rfl:     oxyCODONE-acetaminophen (Percocet) 5-325 mg per tablet, Take 1 tablet by mouth every 6 (six) hours as needed for moderate pain Max Daily Amount: 4 tablets, Disp: 6 tablet, Rfl: 0    rosuvastatin (CRESTOR) 10 MG tablet, Take 10 mg by mouth daily, Disp: , Rfl:     SUPER B COMPLEX/C CAPS, Take 1 capsule by mouth daily with dinner , Disp: , Rfl:   No Known Allergies    Physical Exams:     Vitals:    04/06/22 1131   BP: 132/72   Pulse: 84   Resp: 16   Temp: (!) 97 2 °F (36 2 °C)   SpO2: 96% Physical Exam  Chest:          Comments: Examination the right breast demonstrates a well-healed incision  There is no evidence of infection hematoma or seroma  Results & Discussion:   I have recommended the prelude test as outlined above  I provided her information regarding this  Her pathology was reviewed with her her margins are negative she had only has ductal carcinoma in Situ  We will see her back in 3 months  I would not recommend anti hormonal therapy for DCIS at her age

## 2022-04-13 ENCOUNTER — TELEPHONE (OUTPATIENT)
Dept: SURGICAL ONCOLOGY | Facility: CLINIC | Age: 77
End: 2022-04-13

## 2022-04-13 NOTE — TELEPHONE ENCOUNTER
Kvng Escobar I made contact telephonically later today from my previous note  I reviewed her percentages as being 10% recurrence with radiation and 30% recurrence without radiation therapy she was adamant that she did not want radiation therapy she was happy with having a 70% chance of not having recurrence and more than likely being able to go in and repeat the procedure if she did  All questions were answered  She preferred not to have her appoint with Radiation Oncology doctors  We will see her back in July  She understands we would follow her over the next 5 years and coordinate her imaging

## 2022-04-13 NOTE — TELEPHONE ENCOUNTER
I called Patti to review her prelude test   I left a message for her to return my call  That time I will explain that she had an elevated risk of her cancer coming back with a 31% chance of developing a breast cancer in the future which will be reduced to a 9% with radiation therapy  I would recommend a consult with Radiation Oncology though I know she did not want radiation

## 2022-06-28 LAB
CREAT ?TM UR-SCNC: 33.2 UMOL/L
EXT PROTEIN URINE: 5.8
PROT/CREAT UR: 0.17 MG/G{CREAT}

## 2022-07-07 PROBLEM — Z86.000 HISTORY OF DUCTAL CARCINOMA IN SITU (DCIS) OF BREAST: Status: ACTIVE | Noted: 2022-02-22

## 2022-07-11 ENCOUNTER — OFFICE VISIT (OUTPATIENT)
Dept: SURGICAL ONCOLOGY | Facility: CLINIC | Age: 77
End: 2022-07-11
Payer: COMMERCIAL

## 2022-07-11 VITALS
HEIGHT: 63 IN | WEIGHT: 181.5 LBS | BODY MASS INDEX: 32.16 KG/M2 | OXYGEN SATURATION: 94 % | SYSTOLIC BLOOD PRESSURE: 126 MMHG | DIASTOLIC BLOOD PRESSURE: 82 MMHG | TEMPERATURE: 98.7 F | HEART RATE: 72 BPM | RESPIRATION RATE: 16 BRPM

## 2022-07-11 DIAGNOSIS — Z08 ENCOUNTER FOR FOLLOW-UP SURVEILLANCE OF DUCTAL CARCINOMA IN SITU OF BREAST: ICD-10-CM

## 2022-07-11 DIAGNOSIS — Z86.000 HISTORY OF DUCTAL CARCINOMA IN SITU (DCIS) OF BREAST: Primary | ICD-10-CM

## 2022-07-11 DIAGNOSIS — Z86.000 ENCOUNTER FOR FOLLOW-UP SURVEILLANCE OF DUCTAL CARCINOMA IN SITU OF BREAST: ICD-10-CM

## 2022-07-11 DIAGNOSIS — N60.92 ATYPICAL DUCTAL HYPERPLASIA OF LEFT BREAST: ICD-10-CM

## 2022-07-11 PROCEDURE — 99213 OFFICE O/P EST LOW 20 MIN: CPT | Performed by: SURGERY

## 2022-07-11 NOTE — PROGRESS NOTES
Surgical Oncology Follow Up       [address] [place]  [facility] [place]  [address]   [facility] [address]  [birth year]  [id number]  [address] [facility] [place]  [address] [facility] [id number]    Chief Complaint   Patient presents with    Follow-up     Ductal carcinoma in situ (DCIS) of right breast          Assessment & Plan:   Patient presents for a three-month follow-up visit  She has no complaints referable to her breast other than some intermittent discomfort which resolved relatively quickly by changing position  She is due for a mammogram in November  We will coordinate that for her  We will see her back in 6 months  She is agreeable to see our nurse practitioner at that time  Cancer History:     Oncology History   Atypical ductal hyperplasia of left breast   7/27/2012 Surgery    Left lumpectomy  Atypical ductal hyperplasia  Atypical lobular hyperplasia  Negative for malignancy     History of ductal carcinoma in situ (DCIS) of breast   2/11/2022 Biopsy    Right breast stereotactic biopsy  10 o'clock, middle  Ductal carcinoma in situ  Grade 2  , [place] 90    Concordant  Malignancy may be locally multifocal, spanning 2 cm  US right axilla not performed  Left breast clear on 11/2021 mammo  3/22/2022 Surgery    Right breast LIBBY  directed lumpectomy  Ductal carcinoma in situ  Grade 2  1 4 cm  Margins negative  0/1 Regional lymph node  Stage 0     4/7/2022 Genomic Testing    DCISion RT high risk (7 3)  10 year total risk with breast conserving surgery alone 31%  10 year total risk with breast conserving surgery and radiation 9%             Interval History:   See above, the patient has minimal discomfort  She has elected not to have radiation therapy    She is due for mammogram in November    Review of Systems:   Review of Systems   All other systems reviewed and are negative        Past Medical History     Patient Active Problem List   Diagnosis    CKD (chronic kidney disease) stage 3, GFR 30-59 ml/min (HCC)    COPD, severe (HCC)    Essential hypertension    Hypercholesteremia    Renal cyst, acquired, right    Squamous cell cancer of skin of buttock    Environmental allergies    Hypothyroidism    Atypical ductal hyperplasia of left breast    Mixed hyperlipidemia    Nocturia    Sciatica    Spinal stenosis of lumbar region without neurogenic claudication    Increased frequency of urination    Moderate persistent asthma without complication    Lesion of vocal cord    JARETT (acute kidney injury) ([facility] )    Cigarette nicotine dependence in remission    Chronic cough    Non-seasonal allergic rhinitis due to pollen    History of ductal carcinoma in situ (DCIS) of breast    Prediabetes    Venous stasis of lower extremity     Past Medical History:   Diagnosis Date    Asthma     Cancer ([facility] )     vocal cord cancer, s/p radiation (2008)    Chronic kidney disease     Colon polyp     COPD (chronic obstructive pulmonary disease) ([place] [address] )     Disease of thyroid gland     Environmental allergies     History of radiation therapy 2008    Hyperlipidemia     Hypertension     Hypothyroidism     Renal cyst      Past Surgical History:   Procedure Laterality Date    BREAST BIOPSY Left 06/22/2012    in situ    BREAST BIOPSY Right 02/11/2022    stereo    BREAST LUMPECTOMY Left 07/24/2012    Left breast ADH/ALH    BREAST LUMPECTOMY Right 3/22/2022    Procedure: BREAST LIBBY  DIRECTED LUMPECTOMY;  Surgeon: Hector Alvarado MD;  [facility];  Service: Surgical Oncology    CHOLECYSTECTOMY      COLONOSCOPY      MAMMO NEEDLE LOCALIZATION LEFT (ALL INC) Right 3/16/2022    MAMMO STEREOTACTIC BREAST BIOPSY RIGHT (ALL INC) Right 02/11/2022    NH LARYNGOSCOPY,DIRECT,SCOPE,INJ CORDS Left 03/29/2019    Procedure: MICROLARYNGOSCOPY AND EXCISION OF LEFT VOCAL CORD LESION; Surgeon: Mone Felder MD;  [facility];  Service: ENT    SKIN CANCER EXCISION  2011    Squamous cell    TONSILLECTOMY       Family History   Problem Relation Age of Onset    Heart disease Mother     Emphysema Mother     Diabetes Mother     Heart disease Father     No Known Problems Daughter     No Known Problems Maternal Grandmother     COPD Maternal Grandfather     No Known Problems Paternal Grandmother     Stroke Paternal Grandfather     No Known Problems Son     No Known Problems Maternal Aunt     No Known Problems Paternal Aunt     No Known Problems Paternal Aunt     No Known Problems Paternal Aunt      Social History     Socioeconomic History    Marital status: /Civil Union     Spouse name: Not on file    Number of children: Not on file    Years of education: Not on file    Highest education level: Not on file   Occupational History    Not on file   Tobacco Use    Smoking status: Former Smoker     Packs/day: 1 00     Years: 49 00     Pack years: 49 00     Types: Cigarettes     Start date:      Quit date: 2008     Years since quittin 5    Smokeless tobacco: Never Used   Vaping Use    Vaping Use: Never used   Substance and Sexual Activity    Alcohol use: Yes     Comment: socially    Drug use: No    Sexual activity: Not Currently   Other Topics Concern    Not on file   Social History Narrative    Drinks 2 cups of coffee a day      Social Determinants of Health     Financial Resource Strain: Not on file   Food Insecurity: Not on file   Transportation Needs: Not on file   Physical Activity: Not on file   Stress: Not on file   Social Connections: Not on file   Intimate Partner Violence: Not on file   Housing Stability: Not on file       Current Outpatient Medications:     albuterol (2 5 mg/3 mL) 0 083 % nebulizer solution, Take 3 mL (2 5 mg total) by nebulization every 6 (six) hours as needed for wheezing or shortness of breath, Disp: 360 mL, Rfl: 1   albuterol (Ventolin HFA) 90 mcg/act inhaler, Inhale 2 puffs every 6 (six) hours as needed for wheezing, Disp: 18 g, Rfl: 5    Calcium Carbonate-Vitamin D3 600-400 MG-UNIT TABS, Take 1 tablet by mouth daily with dinner , Disp: , Rfl:     Coenzyme Q10 (CO Q 10) 100 MG CAPS, Take 1 capsule by mouth daily with lunch , Disp: , Rfl:     diltiazem (Dilt-XR) 240 MG 24 hr capsule, Take 1 capsule by mouth daily with dinner  , Disp: , Rfl:     fexofenadine (ALLEGRA) 180 MG tablet, Take 1 tablet by mouth daily in the early morning , Disp: , Rfl:     fluticasone (FLONASE) 50 mcg/act nasal spray, SPRAY 1 SPRAY INTO EACH NOSTRIL TWICE A DAY, Disp: 48 mL, Rfl: 2    fluticasone-umeclidinium-vilanterol (Trelegy Ellipta) 200-62 5-25 MCG/INH AEPB inhaler, Inhale 1 puff daily Rinse mouth after use , Disp: 60 blister, Rfl: 5    guaiFENesin (MUCINEX) 600 mg 12 hr tablet, Take 1,200 mg by mouth every 12 (twelve) hours, Disp: , Rfl:     hydrochlorothiazide (HYDRODIURIL) 25 mg tablet, Take 1 tablet by mouth daily in the early morning Taking every other day , Disp: , Rfl:     lisinopril (ZESTRIL) 5 mg tablet, Take 1 tablet by mouth daily in the early morning , Disp: , Rfl:     montelukast (SINGULAIR) 10 mg tablet, Take 1 tablet (10 mg total) by mouth daily, Disp: 90 tablet, Rfl: 3    Multiple Vitamins-Minerals (MULTI FOR HER PO), Take 1 tablet by mouth daily with breakfast, Disp: , Rfl:     oxyCODONE-acetaminophen (Percocet) 5-325 mg per tablet, Take 1 tablet by mouth every 6 (six) hours as needed for moderate pain Max Daily Amount: 4 tablets, Disp: 6 tablet, Rfl: 0    rosuvastatin (CRESTOR) 10 MG tablet, Take 10 mg by mouth daily, Disp: , Rfl:     SUPER B COMPLEX/C CAPS, Take 1 capsule by mouth daily with dinner , Disp: , Rfl:     levothyroxine 112 mcg tablet, Take 112 mcg by mouth daily in the early morning , Disp: , Rfl:   No Known Allergies    Physical Exam:     Vitals:    07/11/22 1332   BP: 126/82   Pulse: 72   Resp: 16 Temp: 98 7 °F (37 1 °C)   SpO2: 94%     Physical Exam  Vitals reviewed  Constitutional:       Appearance: She is well-developed  HENT:      Head: Normocephalic and atraumatic  Eyes:      Pupils: Pupils are equal, round, and reactive to light  Neck:      Thyroid: No thyromegaly  Vascular: No JVD  Trachea: No tracheal deviation  Cardiovascular:      Rate and Rhythm: Normal rate and regular rhythm  Heart sounds: Normal heart sounds  No murmur heard  No friction rub  No gallop  Pulmonary:      Effort: Pulmonary effort is normal  No respiratory distress  Breath sounds: Normal breath sounds  No wheezing or rales  Chest:      Comments: Both breasts were examined in the sitting and supine position  There are no worrisome skin lesions, no nipple retraction and no nipple discharge  There are no dominant masses, axillary adenopathy or supraclavicular adenopathy on either side  Abdominal:      General: There is no distension  Palpations: Abdomen is soft  There is no hepatomegaly or mass  Tenderness: There is no abdominal tenderness  There is no guarding or rebound  Musculoskeletal:         General: No tenderness  Normal range of motion  Cervical back: Normal range of motion and neck supple  Lymphadenopathy:      Cervical: No cervical adenopathy  Skin:     General: Skin is warm and dry  Findings: No erythema or rash  Neurological:      Mental Status: She is alert and oriented to person, place, and time  Cranial Nerves: No cranial nerve deficit  Psychiatric:         Behavior: Behavior normal            Results & Discussion:   The patient is free of any evidence of local regional or distant recurrence disease  We will coordinate a mammogram for her  She will contact us should she appreciate any changes in her breast   She is agreeable see our nurse practitioner at her next visit            Advance Care Planning/Advance Directives:  I discussed the disease status, treatment plans and follow-up with the patient

## 2022-08-10 ENCOUNTER — TELEPHONE (OUTPATIENT)
Dept: NEPHROLOGY | Facility: CLINIC | Age: 77
End: 2022-08-10

## 2022-08-10 NOTE — TELEPHONE ENCOUNTER
Called patient to schedule follow up  Patient stated she would be calling back to schedule  First attempt

## 2022-10-07 ENCOUNTER — TELEPHONE (OUTPATIENT)
Dept: NEPHROLOGY | Facility: CLINIC | Age: 77
End: 2022-10-07

## 2022-10-07 NOTE — TELEPHONE ENCOUNTER
Reschedule Appointment   Person speaking to  lm   Date of original appointment 1/5    New appointment date 1/4   Patient on dialysis no   [facility]    Provider Dr Aguayo Arrant    Additional Information Lm to call back so we know she rec'd this message       Patient returned call- can't do 1/4 due to cataract surgery   Rescheduled to 1/24

## 2022-11-07 ENCOUNTER — HOSPITAL ENCOUNTER (OUTPATIENT)
Dept: MAMMOGRAPHY | Facility: CLINIC | Age: 77
Discharge: HOME | End: 2022-11-07

## 2022-11-07 DIAGNOSIS — Z86.000 HISTORY OF DUCTAL CARCINOMA IN SITU (DCIS) OF BREAST: ICD-10-CM

## 2022-11-07 PROBLEM — D05.90 BREAST CANCER IN SITU: Status: ACTIVE | Noted: 2022-11-07

## 2022-11-08 DIAGNOSIS — J44.9 COPD, SEVERE (HCC): ICD-10-CM

## 2022-11-09 RX ORDER — FLUTICASONE FUROATE, UMECLIDINIUM BROMIDE AND VILANTEROL TRIFENATATE 200; 62.5; 25 UG/1; UG/1; UG/1
POWDER RESPIRATORY (INHALATION)
Qty: 60 EACH | Refills: 5 | Status: SHIPPED | OUTPATIENT
Start: 2022-11-09

## 2022-11-10 RX ORDER — DILTIAZEM HYDROCHLORIDE 240 MG/1
240 CAPSULE, COATED, EXTENDED RELEASE ORAL DAILY
COMMUNITY
Start: 2022-09-03

## 2022-11-10 RX ORDER — LEVOTHYROXINE SODIUM 0.1 MG/1
TABLET ORAL
COMMUNITY
Start: 2022-08-24

## 2022-11-11 ENCOUNTER — OFFICE VISIT (OUTPATIENT)
Dept: PULMONOLOGY | Facility: CLINIC | Age: 77
End: 2022-11-11

## 2022-11-11 VITALS
DIASTOLIC BLOOD PRESSURE: 82 MMHG | OXYGEN SATURATION: 95 % | WEIGHT: 183.8 LBS | TEMPERATURE: 98.7 F | RESPIRATION RATE: 12 BRPM | HEART RATE: 77 BPM | BODY MASS INDEX: 32.57 KG/M2 | SYSTOLIC BLOOD PRESSURE: 122 MMHG | HEIGHT: 63 IN

## 2022-11-11 DIAGNOSIS — F17.211 CIGARETTE NICOTINE DEPENDENCE IN REMISSION: ICD-10-CM

## 2022-11-11 DIAGNOSIS — J44.9 COPD, SEVERE (HCC): ICD-10-CM

## 2022-11-11 DIAGNOSIS — J45.40 MODERATE PERSISTENT ASTHMA WITHOUT COMPLICATION: Primary | ICD-10-CM

## 2022-11-11 DIAGNOSIS — J45.909 UNCOMPLICATED ASTHMA, UNSPECIFIED ASTHMA SEVERITY, UNSPECIFIED WHETHER PERSISTENT: ICD-10-CM

## 2022-11-11 DIAGNOSIS — J30.1 NON-SEASONAL ALLERGIC RHINITIS DUE TO POLLEN: ICD-10-CM

## 2022-11-11 RX ORDER — MONTELUKAST SODIUM 10 MG/1
10 TABLET ORAL DAILY
Qty: 90 TABLET | Refills: 3 | Status: SHIPPED | OUTPATIENT
Start: 2022-11-11

## 2022-11-11 RX ORDER — FLUTICASONE PROPIONATE 50 MCG
2 SPRAY, SUSPENSION (ML) NASAL DAILY
Qty: 48 ML | Refills: 3 | Status: SHIPPED | OUTPATIENT
Start: 2022-11-11

## 2022-11-11 NOTE — ASSESSMENT & PLAN NOTE
Patient has a 30+ pack-year smoking history and quit 14 years ago  We will perform lung cancer screening CT and if unremarkable, can likely stop screening  Patient understands the value of the scan but also understands limitations and agrees with proceeding

## 2022-11-11 NOTE — PROGRESS NOTES
Pulmonary Follow Up Note   Justin Ahn 69 y o  female MRN: [id number]  11/11/2022      Assessment/Plan:     COPD, severe [facility]  Patient has COPD/asthma overlap and is well-controlled with Trelegy Ellipta once daily  Moderate persistent asthma without complication  We will also continue with Singulair    Non-seasonal allergic rhinitis due to pollen  She will continue Allegra and Flonase for control of allergic rhinitis  Cigarette nicotine dependence in remission  Patient has a 30+ pack-year smoking history and quit 14 years ago  We will perform lung cancer screening CT and if unremarkable, can likely stop screening  Patient understands the value of the scan but also understands limitations and agrees with proceeding  Visit orders:    Diagnoses and all orders for this visit:    Moderate persistent asthma without complication  -     fluticasone (FLONASE) 50 mcg/act nasal spray; 2 sprays into each nostril daily    COPD, severe (HCC)  -     fluticasone (FLONASE) 50 mcg/act nasal spray; 2 sprays into each nostril daily    Non-seasonal allergic rhinitis due to pollen    Uncomplicated asthma, unspecified asthma severity, unspecified whether persistent  -     montelukast (SINGULAIR) 10 mg tablet; Take 1 tablet (10 mg total) by mouth daily    Cigarette nicotine dependence in remission  -     CT lung screening program; Future    Other orders  -     diltiazem (CARDIZEM CD) 240 mg 24 hr capsule; Take 240 mg by mouth daily  -     levothyroxine 100 mcg tablet; TAKE 1 TABLET (100 MCG TOTAL) BY MOUTH DAILY  DX CODE E03 9        Return in about 6 months (around 5/11/2023)  History of Present Illness   HPI:  Justin Ahn is a 69 y o  female who is here today for follow-up regarding asthma/COPD  Since using the Flonase on a regular basis, her cough and congestion have significantly improved  She has no wheezing  She rarely uses her albuterol inhaler  She also takes Singulair and Allegra on a daily basis    No recent ER visits or hospitalizations  She uses Trelegy Ellipta once daily  Review of Systems   Constitutional: Negative for chills, fever and unexpected weight change  HENT: Negative for postnasal drip and sore throat  Eyes: Negative for visual disturbance  Respiratory:        As noted in HPI   Cardiovascular: Negative for chest pain  Gastrointestinal: Negative for abdominal pain, diarrhea and vomiting  Musculoskeletal: Negative for arthralgias  Skin: Negative for rash  Neurological: Negative for headaches  Hematological: Negative for adenopathy  Psychiatric/Behavioral: Negative  All other systems reviewed and are negative        Medical, Family and Social history reviewed and updated as appropriate    Historical Information   Past Medical History:   Diagnosis Date   • Asthma    • Breast cancer in situ    • Cancer ([facility] )     vocal cord cancer, s/p radiation (2008)   • Chronic kidney disease    • Colon polyp    • COPD (chronic obstructive pulmonary disease) ([facility] )    • Disease of thyroid gland    • Environmental allergies    • History of radiation therapy 2008   • Hyperlipidemia    • Hypertension    • Hypothyroidism    • Renal cyst      Past Surgical History:   Procedure Laterality Date   • BREAST BIOPSY Left 06/22/2012    in situ   • BREAST BIOPSY Right 02/11/2022    stereo   • BREAST LUMPECTOMY Left 07/24/2012    Left breast ADH/ALH   • BREAST LUMPECTOMY Right     DCIS cancer Procedure: BREAST LIBBY  DIRECTED LUMPECTOMY;  Surgeon: Israel Escobar MD;  [facility]: [facility];  Service: Surgical Oncology   • CHOLECYSTECTOMY     • COLONOSCOPY     • MAMMO NEEDLE LOCALIZATION LEFT (ALL INC) Right 03/16/2022   • MAMMO STEREOTACTIC BREAST BIOPSY RIGHT (ALL INC) Right 02/11/2022   • UT LARYNGOSCOPY,DIRECT,SCOPE,INJ CORDS Left 03/29/2019    Procedure: MICROLARYNGOSCOPY AND EXCISION OF LEFT VOCAL CORD LESION;  Surgeon: Dunia Salgado MD;  Location: [facility];  Service: ENT   • SKIN CANCER EXCISION 2011    Squamous cell   • TONSILLECTOMY       Family History   Problem Relation Age of Onset   • Heart disease Mother    • Emphysema Mother    • Diabetes Mother    • Heart disease Father    • No Known Problems Daughter    • No Known Problems Maternal Grandmother    • COPD Maternal Grandfather    • No Known Problems Paternal Grandmother    • Stroke Paternal Grandfather    • No Known Problems Son    • No Known Problems Maternal Aunt    • No Known Problems Paternal Aunt    • No Known Problems Paternal Aunt    • No Known Problems Paternal Aunt        Social History     Tobacco Use   Smoking Status Former Smoker   • Packs/day: 1 00   • Years: 49 00   • Pack years: 49 00   • Types: Cigarettes   • Start date: 66   • Quit date: 2008   • Years since quittin 8   Smokeless Tobacco Never Used     Meds/Allergies     Current Outpatient Medications:   •  albuterol (2 5 mg/3 mL) 0 083 % nebulizer solution, Take 3 mL (2 5 mg total) by nebulization every 6 (six) hours as needed for wheezing or shortness of breath, Disp: 360 mL, Rfl: 1  •  albuterol (Ventolin HFA) 90 mcg/act inhaler, Inhale 2 puffs every 6 (six) hours as needed for wheezing, Disp: 18 g, Rfl: 5  •  Calcium Carbonate-Vitamin D3 600-400 MG-UNIT TABS, Take 1 tablet by mouth daily with dinner , Disp: , Rfl:   •  Coenzyme Q10 (CO Q 10) 100 MG CAPS, Take 1 capsule by mouth daily with lunch , Disp: , Rfl:   •  diltiazem (CARDIZEM CD) 240 mg 24 hr capsule, Take 240 mg by mouth daily, Disp: , Rfl:   •  diltiazem (Dilt-XR) 240 MG 24 hr capsule, Take 1 capsule by mouth daily with dinner  , Disp: , Rfl:   •  fexofenadine (ALLEGRA) 180 MG tablet, Take 1 tablet by mouth daily in the early morning , Disp: , Rfl:   •  fluticasone (FLONASE) 50 mcg/act nasal spray, 2 sprays into each nostril daily, Disp: 48 mL, Rfl: 3  •  guaiFENesin (MUCINEX) 600 mg 12 hr tablet, Take 1,200 mg by mouth every 12 (twelve) hours, Disp: , Rfl:   •  hydrochlorothiazide (HYDRODIURIL) 25 mg tablet, Take 1 tablet by mouth daily in the early morning Taking every other day , Disp: , Rfl:   •  levothyroxine 100 mcg tablet, TAKE 1 TABLET (100 MCG TOTAL) BY MOUTH DAILY  DX CODE E03 9, Disp: , Rfl:   •  montelukast (SINGULAIR) 10 mg tablet, Take 1 tablet (10 mg total) by mouth daily, Disp: 90 tablet, Rfl: 3  •  Multiple Vitamins-Minerals (MULTI FOR HER PO), Take 1 tablet by mouth daily with breakfast, Disp: , Rfl:   •  oxyCODONE-acetaminophen (Percocet) 5-325 mg per tablet, Take 1 tablet by mouth every 6 (six) hours as needed for moderate pain Max Daily Amount: 4 tablets, Disp: 6 tablet, Rfl: 0  •  rosuvastatin (CRESTOR) 10 MG tablet, Take 10 mg by mouth daily, Disp: , Rfl:   •  SUPER B COMPLEX/C CAPS, Take 1 capsule by mouth daily with dinner , Disp: , Rfl:   •  Trelegy Ellipta 200-62 5-25 MCG/ACT AEPB inhaler, INHALE 1 PUFF EVERY DAY RINSE MOUTH AFTER USE, Disp: 60 each, Rfl: 5  •  levothyroxine 112 mcg tablet, Take 112 mcg by mouth daily in the early morning , Disp: , Rfl:   •  lisinopril (ZESTRIL) 5 mg tablet, Take 1 tablet by mouth daily in the early morning  (Patient not taking: Reported on 11/11/2022), Disp: , Rfl:   No Known Allergies    Vitals: Blood pressure 122/82, pulse 77, temperature 98 7 °F (37 1 °C), temperature source Tympanic, resp  rate 12, height 5' 3" (1 6 m), weight 83 4 kg (183 lb 12 8 oz), SpO2 95 %  Body mass index is 32 56 kg/m²  Oxygen Therapy  SpO2: 95 %    Physical Exam   Physical Exam  Constitutional:       General: She is not in acute distress  HENT:      Head: Normocephalic  Eyes:      General: No scleral icterus  Pupils: Pupils are equal, round, and reactive to light  Neck:      Vascular: No JVD  Cardiovascular:      Rate and Rhythm: Normal rate and regular rhythm  Pulmonary:      Breath sounds: No wheezing, rhonchi or rales  Abdominal:      Palpations: Abdomen is soft  Tenderness: There is no abdominal tenderness     Musculoskeletal:      Cervical back: Neck supple  Skin:     General: Skin is warm and dry  Neurological:      Mental Status: She is alert and oriented to person, place, and time  Psychiatric:         Mood and Affect: Mood normal          Labs: I have personally reviewed pertinent lab results    Lab Results   Component Value Date    WBC 8 46 03/03/2022    HGB 14 6 03/03/2022    HCT 44 3 03/03/2022    MCV 96 03/03/2022     03/03/2022     Lab Results   Component Value Date    CALCIUM 9 3 03/03/2022    K 3 7 03/03/2022    CO2 30 03/03/2022    CL 97 (L) 03/03/2022    BUN 15 03/03/2022    CREATININE 1 09 03/03/2022     No results found for: IGE  Lab Results   Component Value Date    ALT 27 03/03/2022    AST 19 03/03/2022    ALKPHOS 92 03/03/2022

## 2022-11-17 ENCOUNTER — HOSPITAL ENCOUNTER (OUTPATIENT)
Dept: RADIOLOGY | Age: 77
Discharge: HOME | End: 2022-11-17

## 2022-11-17 DIAGNOSIS — F17.211 CIGARETTE NICOTINE DEPENDENCE IN REMISSION: ICD-10-CM

## 2022-11-28 ENCOUNTER — TELEPHONE (OUTPATIENT)
Dept: PULMONOLOGY | Facility: CLINIC | Age: 77
End: 2022-11-28

## 2022-11-28 DIAGNOSIS — J44.1 COPD EXACERBATION (HCC): Primary | ICD-10-CM

## 2022-11-28 RX ORDER — PREDNISONE 20 MG/1
40 TABLET ORAL DAILY
Qty: 10 TABLET | Refills: 0 | Status: SHIPPED | OUTPATIENT
Start: 2022-11-28

## 2022-11-28 RX ORDER — AZITHROMYCIN 250 MG/1
TABLET, FILM COATED ORAL
Qty: 6 TABLET | Refills: 0 | Status: SHIPPED | OUTPATIENT
Start: 2022-11-28 | End: 2022-12-02

## 2022-11-28 NOTE — TELEPHONE ENCOUNTER
I spoke with Rock Kiran and she reports that she has a cough and some mucus production  She has a funny taste in her mouth and that usually is assigned she has an infection  I have prescribed her prednisone 40 milligrams daily for five days and azithromycin for five days  She is agreeable  She will continue Trelegy and use albuterol as needed  She is aware to call the office with further questions or concerns or if she does not improve      Aaliyah Whitehead

## 2022-11-28 NOTE — TELEPHONE ENCOUNTER
Amor Josue would like a return call regarding     What is the reason for the call/chief complaint? A lot of congestion with a lot of phlegm     What additional symptoms are present or absent? SOB, no   Are they on O2? No Pulse O2 restingna  When walkingna    chest pain/tightness, no  wheezing, no  Cough, yes  mucous production,yes  What color is it greenish yellow   fevers/chills, no  weight gain, no  Swelling no  Pain no, does it hurt when breathing or all the time? na    When did they start/how long have they been going on? Since 11/23/22 and getting worse     Constant or intermittent; if intermittent describe yes? What makes it better or worse?nothing seems to be helping as it has gotten worse     Have you been exposed to anyone that is sick? No    Have you been tested for COVID recently? no    Check current pulmonary medications mucinex, trelegy and rescue   frequency of use daily     Have they had any other treatment or testing for this problem elsewhere? no    Recent steroids? No    Recent Antibiotics? No     Last office visit? 11/11/22    Patient pharmacy?  [facility]    30 or 90 day supply 30

## 2022-12-16 DIAGNOSIS — J44.9 COPD, SEVERE (HCC): ICD-10-CM

## 2022-12-16 DIAGNOSIS — J45.40 MODERATE PERSISTENT ASTHMA WITHOUT COMPLICATION: ICD-10-CM

## 2023-01-18 ENCOUNTER — TELEPHONE (OUTPATIENT)
Dept: NEPHROLOGY | Facility: CLINIC | Age: 78
End: 2023-01-18

## 2023-01-18 ENCOUNTER — TELEPHONE (OUTPATIENT)
Dept: NEPHROLOGY | Facility: HOSPITAL | Age: 78
End: 2023-01-18

## 2023-01-18 DIAGNOSIS — I10 ESSENTIAL HYPERTENSION: ICD-10-CM

## 2023-01-18 DIAGNOSIS — N17.9 AKI (ACUTE KIDNEY INJURY) (HCC): ICD-10-CM

## 2023-01-18 DIAGNOSIS — N18.32 STAGE 3B CHRONIC KIDNEY DISEASE (HCC): Primary | ICD-10-CM

## 2023-01-18 NOTE — TELEPHONE ENCOUNTER
Called and spoke with Patient to complete their bloodwork prior to their appointment on 1/24 with Dr Jp Collins at the [facility] location

## 2023-01-18 NOTE — TELEPHONE ENCOUNTER
Received a call from patient need labs to be fax at [facility] at [address] [place] [address] faxed at # [phone]

## 2023-01-23 ENCOUNTER — TELEPHONE (OUTPATIENT)
Dept: NEPHROLOGY | Facility: CLINIC | Age: 78
End: 2023-01-23

## 2023-01-23 NOTE — TELEPHONE ENCOUNTER
Appointment Confirmation   Person confirmed appointment with  If not patient, name of the person Patient    Date and time of appointment 01/24/23    Patient acknowledged and will be at appointment?  yes   Did you advise the patient that they will need a urine sample if they are a new patient? no   Did you advise the patient to bring their current medications for verification? (including any OTC) yes   Additional Information

## 2023-01-24 ENCOUNTER — OFFICE VISIT (OUTPATIENT)
Dept: NEPHROLOGY | Facility: CLINIC | Age: 78
End: 2023-01-24

## 2023-01-24 VITALS
SYSTOLIC BLOOD PRESSURE: 122 MMHG | HEIGHT: 63 IN | DIASTOLIC BLOOD PRESSURE: 74 MMHG | BODY MASS INDEX: 32.07 KG/M2 | HEART RATE: 76 BPM | WEIGHT: 181 LBS

## 2023-01-24 DIAGNOSIS — I10 ESSENTIAL HYPERTENSION: ICD-10-CM

## 2023-01-24 DIAGNOSIS — N28.1 RENAL CYST, ACQUIRED, RIGHT: ICD-10-CM

## 2023-01-24 DIAGNOSIS — N18.32 STAGE 3B CHRONIC KIDNEY DISEASE (HCC): Primary | ICD-10-CM

## 2023-01-24 DIAGNOSIS — R73.03 PREDIABETES: ICD-10-CM

## 2023-01-24 NOTE — PROGRESS NOTES
NEPHROLOGY OUTPATIENT PROGRESS NOTE   Christine Dow 69 y o  female MRN: [id number]  DATE: 1/24/2023  Reason for visit:   Chief Complaint   Patient presents with   • Chronic Kidney Disease   • Follow-up        Patient Instructions   1  chronic kidney disease, stage 3a per CKD EPI equation (eGFR 45ml/min) likely d/t aging kidney +/- hypertension nephrosclerosis  - sCr 1 29 as of 1/20/23  sCr ranges 1 1-1 3, stable since 2016  -UA and urine microscopy shows leukocyte esterase and few bacteria, otherwise bland  -renal ultrasound shows normal kidneys with right renal cyst  -CKD may be due to HTN nephrosclerosis +/- physiologic CKD of aging  -urine protein:Cr 0 15, FLC 1 55 - acceptable for CKD, SPEP/UPEP negative for monoclonal gammopathy  -as no significant proteinuria or hematuria on UA, will defer glomerular work up  -f/u BMP in 6 months and again in 12 months prior to next office visit    2  Right renal cyst, acquired - stable as of renal ultrasound as of Feb 2019  Size 1 7-1 8cm  As cyst stable, will defer further imaging for now  3  HTN - BP well controlled for age/CKD  -on low salt diet  -continue diltiazem XR 240mg daily, hydrochlorothiazide 25mg daily  -no longer on lisinopril 5mg daily, stopped 6 months ago  -Should monitor BP at home  If BP > 140/90 consistently call the nephrology office  4  CKD BMD - Mag/phos acceptable as of June 2022  PTH was 42 in June 2022  Continue calcium vitamin D3 combination pill daily  5  elevated total CO2 - bicarb 34 as of 1/20/23  ? D/t HCTZ  Monitor BMP  Recommend increased hydration     RTC in 12 months  Obtain bloodwork and urine testing in 6 months  Ursulasakshi Davis was seen today for chronic kidney disease and follow-up  Diagnoses and all orders for this visit:    Stage 3b chronic kidney disease ([facility] )  -     Basic metabolic panel; Future  -     Urinalysis with microscopic; Future  -     Protein / creatinine ratio, urine;  Future  -     Basic metabolic panel; Future  -     Magnesium; Future  -     Phosphorus; Future  -     PTH, intact; Future    Essential hypertension    Renal cyst, acquired, right    Prediabetes        Assessment/Plan:  1  chronic kidney disease, stage 3a per CKD EPI equation (eGFR 45ml/min) likely d/t aging kidney +/- hypertension nephrosclerosis  - sCr 1 29 as of 1/20/23  sCr ranges 1 1-1 3, stable since 2016  -UA and urine microscopy shows leukocyte esterase and few bacteria, otherwise bland  -renal ultrasound shows normal kidneys with right renal cyst  -CKD may be due to HTN nephrosclerosis +/- physiologic CKD of aging  -urine protein:Cr 0 15, FLC 1 55 - acceptable for CKD, SPEP/UPEP negative for monoclonal gammopathy  -as no significant proteinuria or hematuria on UA, will defer glomerular work up  -f/u BMP in 6 months and again in 12 months prior to next office visit    2  Right renal cyst, acquired - stable as of renal ultrasound as of Feb 2019  Size 1 7-1 8cm  As cyst stable, will defer further imaging for now       3  HTN - BP well controlled for age/CKD  -on low salt diet  -continue diltiazem XR 240mg daily, hydrochlorothiazide 25mg daily  -no longer on lisinopril 5mg daily, stopped 6 months ago  -Should monitor BP at home  If BP > 140/90 consistently call the nephrology office      4  CKD BMD - Mag/phos acceptable as of June 2022  PTH was 42 in June 2022  Continue calcium vitamin D3 combination pill daily       5  elevated total CO2 - bicarb 34 as of 1/20/23  ? D/t HCTZ  Monitor BMP  Recommend increased hydration     RTC in 12 months  Obtain bloodwork and urine testing in 6 months  SUBJECTIVE / INTERVAL HISTORY:  69 y o  female presents in follow up of CKD  Kian Narayan had breast surgery in March 2022  Denies NSAID use  HTN - does not check BP at home    Review of Systems   Constitutional: Negative for chills and fever  HENT: Negative for sore throat and trouble swallowing  Eyes: Negative for visual disturbance  Respiratory: Negative for cough and shortness of breath  Cardiovascular: Negative for chest pain and leg swelling  Gastrointestinal: Negative for abdominal pain, constipation, diarrhea, nausea and vomiting  Endocrine: Negative for polyuria  Genitourinary: Negative for difficulty urinating, dysuria and hematuria  Musculoskeletal: Negative for back pain and neck pain  Skin: Negative for rash  Neurological: Positive for dizziness (occasional, has not fallen)  Negative for light-headedness and numbness  Psychiatric/Behavioral: The patient is not nervous/anxious  OBJECTIVE:  /74 (BP Location: Left arm, Patient Position: Sitting, Cuff Size: Standard)   Pulse 76   Ht 5' 3" (1 6 m)   Wt 82 1 kg (181 lb)   BMI 32 06 kg/m²  Body mass index is 32 06 kg/m²  Physical exam:  Physical Exam  Vitals and nursing note reviewed  Constitutional:       General: She is not in acute distress  Appearance: Normal appearance  She is well-developed  She is not diaphoretic  HENT:      Head: Normocephalic and atraumatic  Nose: Nose normal       Mouth/Throat:      Mouth: Mucous membranes are moist       Pharynx: No oropharyngeal exudate  Eyes:      General: No scleral icterus  Right eye: No discharge  Left eye: No discharge  Neck:      Thyroid: No thyromegaly  Cardiovascular:      Rate and Rhythm: Normal rate and regular rhythm  Heart sounds: No murmur heard  Pulmonary:      Effort: Pulmonary effort is normal  No respiratory distress  Breath sounds: Normal breath sounds  No wheezing  Abdominal:      General: Bowel sounds are normal  There is no distension  Palpations: Abdomen is soft  Musculoskeletal:         General: No swelling  Normal range of motion  Cervical back: Normal range of motion and neck supple  Skin:     General: Skin is warm and dry  Findings: No rash  Neurological:      General: No focal deficit present        Mental Status: She is alert  Motor: No abnormal muscle tone  Comments: awake   Psychiatric:         Mood and Affect: Mood normal          Behavior: Behavior normal          Medications:    Current Outpatient Medications:   •  albuterol (2 5 mg/3 mL) 0 083 % nebulizer solution, Take 3 mL (2 5 mg total) by nebulization every 6 (six) hours as needed for wheezing or shortness of breath, Disp: 360 mL, Rfl: 1  •  albuterol (Ventolin HFA) 90 mcg/act inhaler, Inhale 2 puffs every 6 (six) hours as needed for wheezing, Disp: 18 g, Rfl: 5  •  Calcium Carbonate-Vitamin D3 600-400 MG-UNIT TABS, Take 1 tablet by mouth daily with dinner , Disp: , Rfl:   •  Coenzyme Q10 (CO Q 10) 100 MG CAPS, Take 1 capsule by mouth daily with lunch , Disp: , Rfl:   •  diltiazem (CARDIZEM CD) 240 mg 24 hr capsule, Take 240 mg by mouth daily, Disp: , Rfl:   •  fexofenadine (ALLEGRA) 180 MG tablet, Take 1 tablet by mouth daily in the early morning , Disp: , Rfl:   •  fluticasone (FLONASE) 50 mcg/act nasal spray, 2 sprays into each nostril daily, Disp: 48 mL, Rfl: 3  •  guaiFENesin (MUCINEX) 600 mg 12 hr tablet, Take 1,200 mg by mouth in the morning, Disp: , Rfl:   •  hydrochlorothiazide (HYDRODIURIL) 25 mg tablet, Take 1 tablet by mouth daily, Disp: , Rfl:   •  levothyroxine 112 mcg tablet, Take 112 mcg by mouth daily in the early morning , Disp: , Rfl:   •  montelukast (SINGULAIR) 10 mg tablet, Take 1 tablet (10 mg total) by mouth daily, Disp: 90 tablet, Rfl: 3  •  Multiple Vitamins-Minerals (MULTI FOR HER PO), Take 1 tablet by mouth daily with breakfast, Disp: , Rfl:   •  rosuvastatin (CRESTOR) 10 MG tablet, Take 10 mg by mouth daily, Disp: , Rfl:   •  SUPER B COMPLEX/C CAPS, Take 1 capsule by mouth daily with dinner , Disp: , Rfl:   •  Trelegy Ellipta 200-62 5-25 MCG/ACT AEPB inhaler, INHALE 1 PUFF EVERY DAY RINSE MOUTH AFTER USE, Disp: 60 each, Rfl: 5    Allergies:   Allergies as of 01/24/2023   • (No Known Allergies)       The following portions of the patient's history were reviewed and updated as appropriate: past family history, past surgical history and problem list     Laboratory Results:  Lab Results   Component Value Date    SODIUM 137 03/03/2022    K 3 7 03/03/2022    CL 97 (L) 03/03/2022    CO2 30 03/03/2022    BUN 15 03/03/2022    CREATININE 1 09 03/03/2022    GLUC 72 03/03/2022    CALCIUM 9 3 03/03/2022        Lab Results   Component Value Date    CALCIUM 9 3 03/03/2022       Portions of the record may have been created with voice recognition software   Occasional wrong word or "sound a like" substitutions may have occurred due to the inherent limitations of voice recognition software   Read the chart carefully and recognize, using context, where substitutions have occurred

## 2023-01-24 NOTE — PATIENT INSTRUCTIONS
1  chronic kidney disease, stage 3a per CKD EPI equation (eGFR 45ml/min) likely d/t aging kidney +/- hypertension nephrosclerosis  - sCr 1 29 as of 1/20/23  sCr ranges 1 1-1 3, stable since 2016  -UA and urine microscopy shows leukocyte esterase and few bacteria, otherwise bland  -renal ultrasound shows normal kidneys with right renal cyst  -CKD may be due to HTN nephrosclerosis +/- physiologic CKD of aging  -urine protein:Cr 0 15, FLC 1 55 - acceptable for CKD, SPEP/UPEP negative for monoclonal gammopathy  -as no significant proteinuria or hematuria on UA, will defer glomerular work up  -f/u BMP in 6 months and again in 12 months prior to next office visit    2  Right renal cyst, acquired - stable as of renal ultrasound as of Feb 2019  Size 1 7-1 8cm  As cyst stable, will defer further imaging for now  3  HTN - BP well controlled for age/CKD  -on low salt diet  -continue diltiazem XR 240mg daily, hydrochlorothiazide 25mg daily  -no longer on lisinopril 5mg daily, stopped 6 months ago  -Should monitor BP at home  If BP > 140/90 consistently call the nephrology office  4  CKD BMD - Mag/phos acceptable as of June 2022  PTH was 42 in June 2022  Continue calcium vitamin D3 combination pill daily  5  elevated total CO2 - bicarb 34 as of 1/20/23  ? D/t HCTZ  Monitor BMP  Recommend increased hydration     RTC in 12 months  Obtain bloodwork and urine testing in 6 months

## 2023-01-25 ENCOUNTER — OFFICE VISIT (OUTPATIENT)
Dept: SURGICAL ONCOLOGY | Facility: CLINIC | Age: 78
End: 2023-01-25

## 2023-01-25 VITALS
RESPIRATION RATE: 18 BRPM | WEIGHT: 181 LBS | HEIGHT: 63 IN | BODY MASS INDEX: 32.07 KG/M2 | SYSTOLIC BLOOD PRESSURE: 120 MMHG | DIASTOLIC BLOOD PRESSURE: 68 MMHG | OXYGEN SATURATION: 97 % | TEMPERATURE: 97.5 F | HEART RATE: 80 BPM

## 2023-01-25 DIAGNOSIS — N60.92 ATYPICAL DUCTAL HYPERPLASIA OF LEFT BREAST: ICD-10-CM

## 2023-01-25 DIAGNOSIS — Z86.000 HISTORY OF DUCTAL CARCINOMA IN SITU (DCIS) OF BREAST: Primary | ICD-10-CM

## 2023-01-25 NOTE — PROGRESS NOTES
Surgical Oncology Follow Up       [address]  CANCER CARE [facility] SURGICAL ONCOLOGY [place]  [address] [address]    Jaden Less  [id number]  [id number]  [address] [place] [address]  CANCER [facility] SURGICAL ONCOLOGY [place] [address] Nae [place] [id number]    Chief Complaint   Patient presents with   • Follow-up       Assessment/Plan:  1  History of ductal carcinoma in situ (DCIS) of breast  - 6 month follow up    2  Atypical ductal hyperplasia of left breast       Discussion/Summary: Patient is a 81-year-old female presenting for a 6-month follow-up for right breast cancer diagnosed in February 2022  Pathology revealed DCIS ER/NY positive  She underwent a right breast lumpectomy with Dr Antolin Berkowitz  She elected to not pursue radiation therapy  She also has a history of atypical hyperplasia in the left breast in 2022  She had a bilateral diagnostic mammogram on 11/7/2022 which was BI-RADS 2 category 2 density  There were no concerns on her cbe  I will see the patient back in 6 months or sooner should the need arise  She was instructed to call with any questions or concerns prior to this time  All questions were answered today  History of Present Illness:     Oncology History   Atypical ductal hyperplasia of left breast   7/27/2012 Surgery    Left lumpectomy  Atypical ductal hyperplasia  Atypical lobular hyperplasia  Negative for malignancy     History of ductal carcinoma in situ (DCIS) of breast   2/11/2022 Biopsy    Right breast stereotactic biopsy  10 o'clock, middle  Ductal carcinoma in situ  Grade 2  , [place] 90    Concordant  Malignancy may be locally multifocal, spanning 2 cm  US right axilla not performed  Left breast clear on 11/2021 mammo       3/22/2022 Surgery    Right breast LIBBY  directed lumpectomy  Ductal carcinoma in situ  Grade 2  1 4 cm  Margins negative  0/1 Regional lymph node  Stage 0     4/7/2022 Genomic Testing    DCISion RT high risk (7 3)  10 year total risk with breast conserving surgery alone 31%  10 year total risk with breast conserving surgery and radiation 9%            -Interval History: Patient is a 81-year-old female presenting for a 6-month follow-up for right breast cancer diagnosed in February 2022  She had a bilateral diagnostic mammogram on 11/7/2022 which was BI-RADS 2 category 2 density  Patient denies changes on her breast exam  She denies persistent headaches, bone pain, back pain, shortness of breath, or abdominal pain  Review of Systems:  Review of Systems   Constitutional: Negative for activity change, appetite change, fatigue and unexpected weight change  Respiratory: Negative for cough and shortness of breath  Cardiovascular: Negative for chest pain  Gastrointestinal: Negative for abdominal pain, diarrhea, nausea and vomiting  Endocrine: Negative for heat intolerance  Musculoskeletal: Negative for arthralgias, back pain and myalgias  Skin: Negative for rash  Neurological: Negative for weakness and headaches  Hematological: Negative for adenopathy         Patient Active Problem List   Diagnosis   • CKD (chronic kidney disease) stage 3, GFR 30-59 ml/min ([facility])   • COPD, severe ([facility] [address] )   • Essential hypertension   • Hypercholesteremia   • Renal cyst, acquired, right   • Squamous cell cancer of skin of buttock   • Environmental allergies   • Hypothyroidism   • Atypical ductal hyperplasia of left breast   • Mixed hyperlipidemia   • Nocturia   • Sciatica   • Spinal stenosis of lumbar region without neurogenic claudication   • Increased frequency of urination   • Moderate persistent asthma without complication   • Lesion of vocal cord   • JARETT (acute kidney injury) ([facility] [address] )   • Cigarette nicotine dependence in remission   • Chronic cough   • Non-seasonal allergic rhinitis due to pollen   • History of ductal carcinoma in situ (DCIS) of breast   • Prediabetes   • Venous stasis of lower extremity   • Breast cancer in situ     Past Medical History:   Diagnosis Date   • Asthma    • Breast cancer in situ    • Cancer ([facility] [address] )     vocal cord cancer, s/p radiation (2008)   • Chronic kidney disease    • Colon polyp    • COPD (chronic obstructive pulmonary disease) ([facility] [address] )    • Disease of thyroid gland    • Environmental allergies    • History of radiation therapy 2008   • Hyperlipidemia    • Hypertension    • Hypothyroidism    • Renal cyst      Past Surgical History:   Procedure Laterality Date   • BREAST BIOPSY Left 06/22/2012    in situ   • BREAST BIOPSY Right 02/11/2022    stereo   • BREAST LUMPECTOMY Left 07/24/2012    Left breast ADH/ALH   • BREAST LUMPECTOMY Right     DCIS cancer Procedure: BREAST LIBBY  DIRECTED LUMPECTOMY;  Surgeon: Ryann Muhammad MD;  [facility]: [facility];  Service: Surgical Oncology   • CHOLECYSTECTOMY     • COLONOSCOPY     • MAMMO NEEDLE LOCALIZATION LEFT (ALL INC) Right 03/16/2022   • MAMMO STEREOTACTIC BREAST BIOPSY RIGHT (ALL INC) Right 02/11/2022   • [facility] W/N[place]X VOCAL CORD THER W/MICRO/TELESCOPE Left 03/29/2019    Procedure: MICROLARYNGOSCOPY AND EXCISION OF LEFT VOCAL CORD LESION;  Surgeon: Raynette Mortimer, MD;  Location: [facility];  Service: ENT   • SKIN CANCER EXCISION  07/07/2011    Squamous cell   • TONSILLECTOMY       Family History   Problem Relation Age of Onset   • Heart disease Mother    • Emphysema Mother    • Diabetes Mother    • Heart disease Father    • No Known Problems Daughter    • No Known Problems Maternal Grandmother    • COPD Maternal Grandfather    • No Known Problems Paternal Grandmother    • Stroke Paternal Grandfather    • No Known Problems Son    • No Known Problems Maternal Aunt    • No Known Problems Paternal Aunt    • No Known Problems Paternal Aunt    • No Known Problems Paternal Aunt      Social History     Socioeconomic History   • Marital status: /Civil Union     Spouse name: Not on file   • Number of children: Not on file   • Years of education: Not on file • Highest education level: Not on file   Occupational History   • Not on file   Tobacco Use   • Smoking status: Former     Packs/day: 1 00     Years: 49 00     Pack years: 49 00     Types: Cigarettes     Start date: 66     Quit date: 1/1/2008     Years since quitting: 15 0   • Smokeless tobacco: Never   Vaping Use   • Vaping Use: Never used   Substance and Sexual Activity   • Alcohol use: Yes     Comment: socially   • Drug use: No   • Sexual activity: Not Currently   Other Topics Concern   • Not on file   Social History Narrative    Drinks 2 cups of coffee a day      Social Determinants of Health     Financial Resource Strain: Not on file   Food Insecurity: Not on file   Transportation Needs: Not on file   Physical Activity: Not on file   Stress: Not on file   Social Connections: Not on file   Intimate Partner Violence: Not on file   Housing Stability: Not on file       Current Outpatient Medications:   •  albuterol (2 5 mg/3 mL) 0 083 % nebulizer solution, Take 3 mL (2 5 mg total) by nebulization every 6 (six) hours as needed for wheezing or shortness of breath, Disp: 360 mL, Rfl: 1  •  albuterol (Ventolin HFA) 90 mcg/act inhaler, Inhale 2 puffs every 6 (six) hours as needed for wheezing, Disp: 18 g, Rfl: 5  •  Calcium Carbonate-Vitamin D3 600-400 MG-UNIT TABS, Take 1 tablet by mouth daily with dinner , Disp: , Rfl:   •  Coenzyme Q10 (CO Q 10) 100 MG CAPS, Take 1 capsule by mouth daily with lunch , Disp: , Rfl:   •  diltiazem (CARDIZEM CD) 240 mg 24 hr capsule, Take 240 mg by mouth daily, Disp: , Rfl:   •  fexofenadine (ALLEGRA) 180 MG tablet, Take 1 tablet by mouth daily in the early morning , Disp: , Rfl:   •  fluticasone (FLONASE) 50 mcg/act nasal spray, 2 sprays into each nostril daily, Disp: 48 mL, Rfl: 3  •  guaiFENesin (MUCINEX) 600 mg 12 hr tablet, Take 1,200 mg by mouth in the morning, Disp: , Rfl:   •  hydrochlorothiazide (HYDRODIURIL) 25 mg tablet, Take 1 tablet by mouth daily, Disp: , Rfl:   • levothyroxine 112 mcg tablet, Take 112 mcg by mouth daily in the early morning , Disp: , Rfl:   •  montelukast (SINGULAIR) 10 mg tablet, Take 1 tablet (10 mg total) by mouth daily, Disp: 90 tablet, Rfl: 3  •  Multiple Vitamins-Minerals (MULTI FOR HER PO), Take 1 tablet by mouth daily with breakfast, Disp: , Rfl:   •  rosuvastatin (CRESTOR) 10 MG tablet, Take 10 mg by mouth daily, Disp: , Rfl:   •  SUPER B COMPLEX/C CAPS, Take 1 capsule by mouth daily with dinner , Disp: , Rfl:   •  Trelegy Ellipta 200-62 5-25 MCG/ACT AEPB inhaler, INHALE 1 PUFF EVERY DAY RINSE MOUTH AFTER USE, Disp: 60 each, Rfl: 5  No Known Allergies  There were no vitals filed for this visit  Physical Exam  Constitutional:       General: She is not in acute distress  Appearance: Normal appearance  Cardiovascular:      Rate and Rhythm: Normal rate and regular rhythm  Pulses: Normal pulses  Heart sounds: Normal heart sounds  Pulmonary:      Effort: Pulmonary effort is normal       Breath sounds: Normal breath sounds  Chest:      Chest wall: No mass  Breasts:     Right: No swelling, bleeding, inverted nipple, mass, nipple discharge, skin change or tenderness  Left: No swelling, bleeding, inverted nipple, mass, nipple discharge, skin change or tenderness  Comments: B/l lumpectomy scars  No masses, nodularity, skin changes, nipple changes or discharge, or adenopathy appreciated on physical exam      Abdominal:      General: Abdomen is flat  Palpations: Abdomen is soft  Lymphadenopathy:      Upper Body:      Right upper body: No supraclavicular, axillary or pectoral adenopathy  Left upper body: No supraclavicular, axillary or pectoral adenopathy  Skin:     General: Skin is warm  Neurological:      General: No focal deficit present  Mental Status: She is alert and oriented to person, place, and time     Psychiatric:         Mood and Affect: Mood normal          Behavior: Behavior normal  Results:    Imaging  No results found  I reviewed the above imaging data  Advance Care Planning/Advance Directives:  Discussed disease status, cancer treatment plans and/or cancer treatment goals with the patient

## 2023-02-26 DIAGNOSIS — J45.909 UNCOMPLICATED ASTHMA, UNSPECIFIED ASTHMA SEVERITY, UNSPECIFIED WHETHER PERSISTENT: ICD-10-CM

## 2023-02-27 RX ORDER — MONTELUKAST SODIUM 10 MG/1
TABLET ORAL
Qty: 90 TABLET | Refills: 3 | Status: SHIPPED | OUTPATIENT
Start: 2023-02-27

## 2023-05-15 DIAGNOSIS — J44.9 COPD, SEVERE (HCC): ICD-10-CM

## 2023-05-15 RX ORDER — FLUTICASONE FUROATE, UMECLIDINIUM BROMIDE AND VILANTEROL TRIFENATATE 200; 62.5; 25 UG/1; UG/1; UG/1
POWDER RESPIRATORY (INHALATION)
Qty: 60 EACH | Refills: 5 | Status: SHIPPED | OUTPATIENT
Start: 2023-05-15

## 2023-05-15 NOTE — TELEPHONE ENCOUNTER
Selina Verma 200-62 5-25 MCG/ACT AEPB inhaler  Patient left a message requesting a refill on this medication

## 2023-05-20 NOTE — PROGRESS NOTES
Problem: At Risk for Falls  Goal: # Patient does not fall  Outcome: Outcome Met, Continue evaluating goal progress toward completion  Goal: # Takes action to control fall-related risks  Outcome: Outcome Met, Continue evaluating goal progress toward completion  Goal: # Verbalizes understanding of fall risk/precautions  Description: Document education using the patient education activity  Outcome: Outcome Met, Continue evaluating goal progress toward completion     Problem: Seizures  Goal: Seizure activity controlled  Outcome: Outcome Met, Continue evaluating goal progress toward completion  Goal: Protected from injury if a seizure occurs  Outcome: Outcome Met, Continue evaluating goal progress toward completion  Goal: Verbalizes understanding of seizures and treatment plan  Description: Document education using the patient education activity.   Outcome: Outcome Met, Continue evaluating goal progress toward completion     Problem: Diabetes  Goal: Achieves glycemic balance  Description: Goal is to maintain blood sugar within range with no episodes of hypoglycemia  Outcome: Outcome Met, Continue evaluating goal progress toward completion  Goal: Verbalizes/demonstrates understanding of NEW diagnosis of diabetes and management  Description: Document on Patient Education Activity  Outcome: Outcome Met, Continue evaluating goal progress toward completion  Goal: Verbalizes understanding of diabetes management including how to use HbA1C to evaluate status of blood sugar over time (Diabetes is NOT a new diagnosis)  Description: Diabetes Education  Outcome: Outcome Met, Continue evaluating goal progress toward completion  Goal: Demonstrates ability to self-administer insulin  Description: Document on Patient Education Activity  Outcome: Outcome Met, Continue evaluating goal progress toward completion     Problem: Urinary Tract Infection  Goal: Urinary Tract Infection (UTI) s/s resolved  Outcome: Outcome Met, Continue  NEPHROLOGY OUTPATIENT PROGRESS NOTE   Berta Rene 71 y o  female MRN: [id number]  DATE: 3/12/2018  Reason for visit:   Chief Complaint   Patient presents with    Follow-up        Patient Instructions   1  chronic kidney disease, stage 3a per CKD EPI equation (eGFR 51ml/min) likely d/t aging kidney +/- hypertension nephrosclerosis   -March 2018 Cr 1 25 and stable  sCr ranges 1 1-1 2  -formal UA shows pH 8,  leukocytes, rare RBC, 3-5 WBC  -renal ultrasound shows normal kidneys with right renal cyst  -CKD may be due to HTN nephrosclerosis +/- physiologic CKD of aging  -urine protein:Cr 0 18, FLC 1 55 - acceptable for CKD, SPEP/UPEP negative for monoclonal gammopathy  -as no significant proteinuria or hematuria on UA, will defer glomerular work up  -f/u repeat BMP in 4 months    2  renal cyst, acquired - monitor with repeat ultrasound in 12 months (February 2019)  1 8 cm cyst from renal u/s 2/26/18    3  HTN - BP relatively well controlled, continue diltiazem XR 240mg daily, hydrochlorothiazide 25mg daily, lisinopril 5mg daily  Should check BP at home  If BP > 140/90 consistently call the nephrology office  4  CKD BMD - Mag/phos/PTH/25(OH) vitamin D levels acceptable  Continue calcium vitamin D3 combination pill daily  5  Pedal edema - monitor this  Elevated leg  Avoid high salt diet  RTC in 6 months  Katelyn Hill was seen today for follow-up  Diagnoses and all orders for this visit:    Chronic kidney disease, stage 3a  -     Basic metabolic panel; Future    Benign hypertension    Renal cyst, acquired, right  -     US retroperitoneal complete; Future        Assessment/Plan:  1  chronic kidney disease, stage 3a per CKD EPI equation (eGFR 51ml/min) likely d/t aging kidney +/- hypertension nephrosclerosis   -March 2018 Cr 1 25 and stable   sCr ranges 1 1-1 2  -formal UA shows pH 8,  leukocytes, rare RBC, 3-5 WBC  -renal ultrasound shows normal kidneys with right renal cyst  -CKD may be due to HTN nephrosclerosis +/- physiologic CKD of aging  -urine protein:Cr 0 18, FLC 1 55 - acceptable for CKD, SPEP/UPEP negative for monoclonal gammopathy  -as no significant proteinuria or hematuria on UA, will defer glomerular work up  -f/u repeat BMP in 4 months    2  renal cyst, acquired - monitor with repeat ultrasound in 12 months (February 2019)  1 8 cm cyst from renal u/s 2/26/18    3  HTN - BP relatively well controlled, continue diltiazem XR 240mg daily, hydrochlorothiazide 25mg daily, lisinopril 5mg daily  Should check BP at home  If BP > 140/90 consistently call the nephrology office  4  CKD BMD - Mag/phos/PTH/25(OH) vitamin D levels acceptable  Continue calcium vitamin D3 combination pill daily  5  Pedal edema - monitor this  Elevated leg  Avoid high salt diet  RTC in 6 months  SUBJECTIVE / INTERVAL HISTORY:  71 y o  female presents in follow up of CKD  Andrea Blanton denies any recent hospitalizations/medication changes since last office visit  She was sick with a virus in December 2017 which aggravated her COPD  She had been on antibiotics and prednisone x 2 rounds  Denies regular NSAID use  Denies fevers, chills, nausea or vomiting  Denies chest pain or shortness of breath  She has her synthroid dose adjusted and thinks she has leg edema due to this  Does not check BP at home  Review of Systems   Constitutional: Negative for chills and fever  HENT: Negative for sore throat and trouble swallowing  Eyes: Negative for visual disturbance  Respiratory: Negative for cough and shortness of breath  Cardiovascular: Positive for leg swelling  Negative for chest pain  Gastrointestinal: Negative for diarrhea, nausea and vomiting  Endocrine: Negative for polyuria  Genitourinary: Negative for difficulty urinating, dysuria and hematuria  Musculoskeletal: Negative for back pain and neck pain  Skin: Negative for rash     Neurological: Negative for dizziness, evaluating goal progress toward completion  Goal: Verbalizes s/s of UTI and treatment plan  Description: Document on Patient Education Activity   Outcome: Outcome Met, Continue evaluating goal progress toward completion      light-headedness and numbness  Hematological: Negative for adenopathy  Bruises/bleeds easily  Psychiatric/Behavioral: Negative for behavioral problems  The patient is not nervous/anxious  OBJECTIVE:  /78   Ht 5' 3" (1 6 m)   Wt 87 2 kg (192 lb 3 2 oz)   BMI 34 05 kg/m²  Body mass index is 34 05 kg/m²  Physical exam:  Physical Exam   Constitutional: She appears well-developed and well-nourished  No distress  HENT:   Head: Normocephalic and atraumatic  Mouth/Throat: No oropharyngeal exudate  Eyes: Right eye exhibits no discharge  Left eye exhibits no discharge  No scleral icterus  Neck: Normal range of motion  Neck supple  No thyromegaly present  Cardiovascular: Normal rate and regular rhythm  No murmur heard  Pulmonary/Chest: Effort normal and breath sounds normal  No respiratory distress  She has no wheezes  Abdominal: Soft  Bowel sounds are normal  She exhibits no distension  Musculoskeletal: She exhibits edema (+pedal edema)  Neurological: She is alert  She exhibits normal muscle tone  awake   Skin: Skin is warm and dry  No rash noted  She is not diaphoretic  Psychiatric: She has a normal mood and affect  Her behavior is normal    Nursing note and vitals reviewed        Medications:    Current Outpatient Prescriptions:     albuterol (PROAIR HFA) 90 mcg/act inhaler, Inhale, Disp: , Rfl:     budesonide-formoterol (SYMBICORT) 160-4 5 mcg/act inhaler, Inhale 2 puffs 2 (two) times a day, Disp: , Rfl:     levothyroxine 75 mcg tablet, Take 75 mcg by mouth, Disp: , Rfl:     montelukast (SINGULAIR) 10 mg tablet, Take 1 tablet by mouth daily, Disp: , Rfl:     Calcium Carbonate (CALCIUM 600) 1500 (600 Ca) MG TABS, Take 1 tablet by mouth daily, Disp: , Rfl:     Calcium Carbonate-Vitamin D3 600-400 MG-UNIT TABS, Take by mouth, Disp: , Rfl:     Cholecalciferol (VITAMIN D3) 1000 units CAPS, Take by mouth, Disp: , Rfl:     Coenzyme Q10 (CO Q 10) 100 MG CAPS, Take 1 capsule by mouth 2 (two) times a day, Disp: , Rfl:     diltiazem (DILT-XR) 240 MG 24 hr capsule, Take 1 capsule by mouth daily, Disp: , Rfl:     fexofenadine (ALLEGRA) 180 MG tablet, Take 1 tablet by mouth daily, Disp: , Rfl:     hydrochlorothiazide (HYDRODIURIL) 25 mg tablet, Take 1 tablet by mouth daily, Disp: , Rfl:     levothyroxine 75 mcg tablet, , Disp: , Rfl:     lisinopril (ZESTRIL) 5 mg tablet, Take 1 tablet by mouth daily, Disp: , Rfl:     lisinopril (ZESTRIL) 5 mg tablet, , Disp: , Rfl:     lovastatin (MEVACOR) 20 mg tablet, Take 1 tablet by mouth daily, Disp: , Rfl:     lovastatin (MEVACOR) 20 mg tablet, , Disp: , Rfl:     montelukast (SINGULAIR) 10 mg tablet, , Disp: , Rfl:     SUPER B COMPLEX/C CAPS, Take 1 capsule by mouth daily, Disp: , Rfl:     Allergies: Allergies as of 03/12/2018 - Reviewed 03/12/2018   Allergen Reaction Noted    Pollen extract  03/13/2015       The following portions of the patient's history were reviewed and updated as appropriate: past family history, past surgical history and problem list     Laboratory Results:  No results found for: GLUCOSE, CALCIUM, NA, K, CO2, CL, BUN, CREATININE     No results found for: PTH, CALCIUM, CAION, PHOS    Portions of the record may have been created with voice recognition software   Occasional wrong word or "sound a like" substitutions may have occurred due to the inherent limitations of voice recognition software   Read the chart carefully and recognize, using context, where substitutions have occurred

## 2023-05-30 ENCOUNTER — OFFICE VISIT (OUTPATIENT)
Dept: PULMONOLOGY | Facility: CLINIC | Age: 78
End: 2023-05-30

## 2023-05-30 VITALS
SYSTOLIC BLOOD PRESSURE: 108 MMHG | BODY MASS INDEX: 31.89 KG/M2 | DIASTOLIC BLOOD PRESSURE: 72 MMHG | HEART RATE: 82 BPM | TEMPERATURE: 97.6 F | WEIGHT: 180 LBS | OXYGEN SATURATION: 96 % | HEIGHT: 63 IN

## 2023-05-30 DIAGNOSIS — J45.40 MODERATE PERSISTENT ASTHMA WITHOUT COMPLICATION: ICD-10-CM

## 2023-05-30 DIAGNOSIS — F17.211 CIGARETTE NICOTINE DEPENDENCE IN REMISSION: ICD-10-CM

## 2023-05-30 DIAGNOSIS — J44.9 COPD, SEVERE (HCC): ICD-10-CM

## 2023-05-30 DIAGNOSIS — J44.9 CHRONIC OBSTRUCTIVE PULMONARY DISEASE, UNSPECIFIED COPD TYPE (HCC): ICD-10-CM

## 2023-05-30 DIAGNOSIS — J30.1 NON-SEASONAL ALLERGIC RHINITIS DUE TO POLLEN: Primary | ICD-10-CM

## 2023-05-30 RX ORDER — IPRATROPIUM BROMIDE 42 UG/1
2 SPRAY, METERED NASAL 4 TIMES DAILY
Qty: 15 ML | Refills: 3 | Status: SHIPPED | OUTPATIENT
Start: 2023-05-30

## 2023-05-30 RX ORDER — LEVOTHYROXINE SODIUM 0.1 MG/1
TABLET ORAL
COMMUNITY
Start: 2023-02-27

## 2023-05-30 RX ORDER — ERYTHROMYCIN 5 MG/G
OINTMENT OPHTHALMIC
COMMUNITY
Start: 2023-03-13

## 2023-05-30 RX ORDER — ALBUTEROL SULFATE 90 UG/1
2 AEROSOL, METERED RESPIRATORY (INHALATION) EVERY 6 HOURS PRN
Qty: 18 G | Refills: 5 | Status: SHIPPED | OUTPATIENT
Start: 2023-05-30

## 2023-05-30 NOTE — ASSESSMENT & PLAN NOTE
Most of her symptoms seem to be driven by uncontrolled allergic rhinitis, provoked by season change  I have added Atrovent nasal spray in addition to the Flonase and Singulair

## 2023-05-30 NOTE — ASSESSMENT & PLAN NOTE
She does not appear to be having exacerbation  No need for any further steroids  She will continue with Trelegy 200 mcg daily and albuterol as needed

## 2023-06-13 NOTE — PROGRESS NOTES
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   (+) Osteoarthritis of right shoulder   (+) Primary osteoarthritis, left shoulder      NEURO/PSYCH   (+) Chronic pain in right shoulder      PULMONARY   (+) Asthma   (+) Moderate persistent asthma without complication   (+) DONALD (obstructive sleep apnea)   (+) Orthopnea             Anesthesia Plan  ASA Score- 3     Anesthesia Type- IV sedation with anesthesia with ASA Monitors  Additional Monitors:   Airway Plan:           Plan Factors-    Chart reviewed  EKG reviewed  Existing labs reviewed  Patient summary reviewed              Induction-     Postoperative Plan-     Informed Consent- Pulmonary Follow Up Note   Jose R Sanches 69 y o  female MRN: [id number]  5/30/2023      Assessment/Plan:     Non-seasonal allergic rhinitis due to pollen  Most of her symptoms seem to be driven by uncontrolled allergic rhinitis, provoked by season change  I have added Atrovent nasal spray in addition to the Flonase and Singulair  COPD, severe ([address] )  She does not appear to be having exacerbation  No need for any further steroids  She will continue with Trelegy 200 mcg daily and albuterol as needed  Cigarette nicotine dependence in remission  Patient quit smoking over 15 years ago and therefore no longer needs screening scans    Visit orders:    Diagnoses and all orders for this visit:    Non-seasonal allergic rhinitis due to pollen  -     ipratropium (ATROVENT) 0 06 % nasal spray; 2 sprays into each nostril 4 (four) times a day    COPD, severe (HCC)    Chronic obstructive pulmonary disease, unspecified COPD type (HCC)  -     albuterol (Ventolin HFA) 90 mcg/act inhaler; Inhale 2 puffs every 6 (six) hours as needed for wheezing    Moderate persistent asthma without complication    Cigarette nicotine dependence in remission    Other orders  -     erythromycin (ILOTYCIN) ophthalmic ointment; APPLY 1 APPLICATION IN RIGHT EYE AT BEDTIME (Patient not taking: Reported on 5/30/2023)  -     levothyroxine 100 mcg tablet; TAKE 1 TABLET (100 MCG TOTAL) BY MOUTH DAILY  DX CODE E03 9 (Patient not taking: Reported on 5/30/2023)      Return in about 6 months (around 11/30/2023)  History of Present Illness   HPI:  Jose R Sanches is a 69 y o  female who is here today for follow-up regarding asthma/COPD  Last month, she required a course of antibiotics and steroids secondary to upper respiratory infection that resulted in an exacerbation  She recovered nicely from that  Over the past week, she has again developed increasing cough with sputum that is yellow in color    She feels that most of her issues are coming from sinus congestion with associated postnasal drip  She is compliant with Trelegy Ellipta once daily, though did briefly have to decrease her dose due to an issue with her prescription  She also takes Singulair and Allegra  She uses Flonase once daily  Also takes Mucinex on an as-needed basis  She has no fevers or chills  She denies wheezing  She denies shortness of breath  Review of Systems   Constitutional: Negative for chills, fever and unexpected weight change  HENT: Positive for congestion, postnasal drip, sinus pressure and voice change  Negative for sore throat  Eyes: Negative for visual disturbance  Respiratory:        As noted in HPI   Cardiovascular: Negative for chest pain  Gastrointestinal: Negative for abdominal pain, diarrhea and vomiting  Musculoskeletal: Negative for arthralgias  Skin: Negative for rash  Neurological: Negative for headaches  Hematological: Negative for adenopathy  Psychiatric/Behavioral: Negative  All other systems reviewed and are negative        Medical, Family and Social history reviewed and updated as appropriate    Historical Information   Past Medical History:   Diagnosis Date   • Asthma    • Breast cancer in situ    • Cancer ([facility] )     vocal cord cancer, s/p radiation (2008)   • Chronic kidney disease    • Colon polyp    • COPD (chronic obstructive pulmonary disease) ([facility] )    • Disease of thyroid gland    • Environmental allergies    • History of radiation therapy 2008   • Hyperlipidemia    • Hypertension    • Hypothyroidism    • Renal cyst      Past Surgical History:   Procedure Laterality Date   • BREAST BIOPSY Left 06/22/2012    in situ   • BREAST BIOPSY Right 02/11/2022    stereo   • BREAST LUMPECTOMY Left 07/24/2012    Left breast ADH/ALH   • BREAST LUMPECTOMY Right     DCIS cancer Procedure: BREAST LIBBY  DIRECTED LUMPECTOMY;  Surgeon: Chloe Rahman MD;  [facility];  Service: Surgical Oncology   • CHOLECYSTECTOMY     • COLONOSCOPY • MAMMO NEEDLE LOCALIZATION LEFT (ALL INC) Right 03/16/2022   • MAMMO STEREOTACTIC BREAST BIOPSY RIGHT (ALL INC) Right 02/11/2022   • [place] [address] [place] VOCAL CORD THER W/MICRO/TELESCOPE Left 03/29/2019    Procedure: MICROLARYNGOSCOPY AND EXCISION OF LEFT VOCAL CORD LESION;  Surgeon: Alexandru Simpson MD;  [facility];  Service: ENT   • SKIN CANCER EXCISION  07/07/2011    Squamous cell   • TONSILLECTOMY       Family History   Problem Relation Age of Onset   • Heart disease Mother    • Emphysema Mother    • Diabetes Mother    • Heart disease Father    • No Known Problems Daughter    • No Known Problems Maternal Grandmother    • COPD Maternal Grandfather    • No Known Problems Paternal Grandmother    • Stroke Paternal Grandfather    • No Known Problems Son    • No Known Problems Maternal Aunt    • No Known Problems Paternal Aunt    • No Known Problems Paternal Aunt    • No Known Problems Paternal Aunt        Social History     Tobacco Use   Smoking Status Former   • Packs/day: 1 00   • Years: 49 00   • Total pack years: 49 00   • Types: Cigarettes   • Start date: 66   • Quit date: 1/1/2008   • Years since quitting: 15 4   Smokeless Tobacco Never     Meds/Allergies     Current Outpatient Medications:   •  albuterol (2 5 mg/3 mL) 0 083 % nebulizer solution, Take 3 mL (2 5 mg total) by nebulization every 6 (six) hours as needed for wheezing or shortness of breath, Disp: 360 mL, Rfl: 1  •  albuterol (Ventolin HFA) 90 mcg/act inhaler, Inhale 2 puffs every 6 (six) hours as needed for wheezing, Disp: 18 g, Rfl: 5  •  Calcium Carbonate-Vitamin D3 600-400 MG-UNIT TABS, Take 1 tablet by mouth daily with dinner , Disp: , Rfl:   •  Coenzyme Q10 (CO Q 10) 100 MG CAPS, Take 1 capsule by mouth daily with lunch , Disp: , Rfl:   •  diltiazem (CARDIZEM CD) 240 mg 24 hr capsule, Take 240 mg by mouth daily, Disp: , Rfl:   •  fexofenadine (ALLEGRA) 180 MG tablet, Take 1 tablet by mouth daily in the early morning , Disp: , Rfl:   • "fluticasone (FLONASE) 50 mcg/act nasal spray, 2 sprays into each nostril daily, Disp: 48 mL, Rfl: 3  •  fluticasone-umeclidinium-vilanterol (Trelegy Ellipta) 200-62 5-25 mcg/actuation AEPB inhaler, Inhale 1 puff every day; Rinse mouth after use , Disp: 60 each, Rfl: 5  •  guaiFENesin (MUCINEX) 600 mg 12 hr tablet, Take 1,200 mg by mouth in the morning, Disp: , Rfl:   •  hydrochlorothiazide (HYDRODIURIL) 25 mg tablet, Take 1 tablet by mouth daily, Disp: , Rfl:   •  ipratropium (ATROVENT) 0 06 % nasal spray, 2 sprays into each nostril 4 (four) times a day, Disp: 15 mL, Rfl: 3  •  levothyroxine 112 mcg tablet, Take 112 mcg by mouth daily in the early morning , Disp: , Rfl:   •  montelukast (SINGULAIR) 10 mg tablet, TAKE 1 TABLET BY MOUTH EVERY DAY, Disp: 90 tablet, Rfl: 3  •  Multiple Vitamins-Minerals (MULTI FOR HER PO), Take 1 tablet by mouth daily with breakfast, Disp: , Rfl:   •  rosuvastatin (CRESTOR) 10 MG tablet, Take 10 mg by mouth daily, Disp: , Rfl:   •  SUPER B COMPLEX/C CAPS, Take 1 capsule by mouth daily with dinner , Disp: , Rfl:   •  erythromycin (ILOTYCIN) ophthalmic ointment, APPLY 1 APPLICATION IN RIGHT EYE AT BEDTIME (Patient not taking: Reported on 5/30/2023), Disp: , Rfl:   •  levothyroxine 100 mcg tablet, TAKE 1 TABLET (100 MCG TOTAL) BY MOUTH DAILY  DX CODE E03 9 (Patient not taking: Reported on 5/30/2023), Disp: , Rfl:   No Known Allergies    Vitals: Blood pressure 108/72, pulse 82, temperature 97 6 °F (36 4 °C), height 5' 3\" (1 6 m), weight 81 6 kg (180 lb), SpO2 96 %  Body mass index is 31 89 kg/m²  Oxygen Therapy  SpO2: 96 %  Oxygen Therapy: None (Room air)    Physical Exam   Physical Exam  Constitutional:       General: She is not in acute distress  HENT:      Head: Normocephalic  Right Ear: Tympanic membrane normal       Left Ear: Tympanic membrane normal       Nose: Congestion present  Comments: Turbinate hypertrophy     Mouth/Throat:      Pharynx: No oropharyngeal exudate   " "  Eyes:      General: No scleral icterus  Pupils: Pupils are equal, round, and reactive to light  Neck:      Vascular: No JVD  Cardiovascular:      Rate and Rhythm: Normal rate and regular rhythm  Pulmonary:      Breath sounds: No wheezing, rhonchi or rales  Abdominal:      Palpations: Abdomen is soft  Tenderness: There is no abdominal tenderness  Musculoskeletal:      Cervical back: Neck supple  Lymphadenopathy:      Cervical: No cervical adenopathy  Skin:     General: Skin is warm and dry  Neurological:      Mental Status: She is alert and oriented to person, place, and time  Psychiatric:         Mood and Affect: Mood normal      Labs: I have personally reviewed pertinent lab results  Lab Results   Component Value Date    HCT 44 3 03/03/2022    HGB 14 6 03/03/2022    MCV 96 03/03/2022     03/03/2022    WBC 8 46 03/03/2022     Lab Results   Component Value Date    BUN 15 03/03/2022    CALCIUM 9 3 03/03/2022    CL 97 (L) 03/03/2022    CO2 30 03/03/2022    CREATININE 1 09 03/03/2022    K 3 7 03/03/2022     No results found for: \"IGE\"  Lab Results   Component Value Date    ALKPHOS 92 03/03/2022    ALT 27 03/03/2022    AST 19 03/03/2022     Imaging and other studies: I have personally reviewed pertinent reports  and I have personally reviewed pertinent films in PACS CT of the chest from November 2022 shows stable scarring, no nodules    Pulmonary function testing:  Performed 10/20/2021 and personally interpreted  FEV1/FVC ratio 49%   FEV1 48% predicted  FVC 77% predicted  No response to bronchodilators   % predicted   % predicted  DLCO corrected for hemoglobin 49 % predicted    Severe obstruction, mild air trapping and severe diffusion impairment    "

## 2023-06-22 DIAGNOSIS — J30.1 NON-SEASONAL ALLERGIC RHINITIS DUE TO POLLEN: ICD-10-CM

## 2023-06-26 RX ORDER — IPRATROPIUM BROMIDE 42 UG/1
SPRAY, METERED NASAL
Qty: 45 ML | Refills: 2 | Status: SHIPPED | OUTPATIENT
Start: 2023-06-26

## 2023-07-22 DIAGNOSIS — J30.1 NON-SEASONAL ALLERGIC RHINITIS DUE TO POLLEN: ICD-10-CM

## 2023-07-26 RX ORDER — IPRATROPIUM BROMIDE 42 UG/1
SPRAY, METERED NASAL
Qty: 45 ML | Refills: 3 | Status: SHIPPED | OUTPATIENT
Start: 2023-07-26

## 2023-08-02 ENCOUNTER — TELEPHONE (OUTPATIENT)
Dept: HEMATOLOGY ONCOLOGY | Facility: CLINIC | Age: 78
End: 2023-08-02

## 2023-08-02 NOTE — TELEPHONE ENCOUNTER
Called and spoke to patient regarding missed appt with Tico Dunham at 3:00pm today.  Rescheduled patient for 9/5/2023 at 3pm.

## 2023-09-01 ENCOUNTER — TELEPHONE (OUTPATIENT)
Dept: GYNECOLOGIC ONCOLOGY | Facility: CLINIC | Age: 78
End: 2023-09-01

## 2023-09-01 ENCOUNTER — TELEPHONE (OUTPATIENT)
Dept: HEMATOLOGY ONCOLOGY | Facility: CLINIC | Age: 78
End: 2023-09-01

## 2023-09-01 NOTE — TELEPHONE ENCOUNTER
Left message for patient to call the office to change appointment with Pauline Salomon on 9/5/2023 from 3:00PM to 4:00PM per provider's request due to provider having an appointment at 2:45PM.

## 2023-09-01 NOTE — TELEPHONE ENCOUNTER
Patient Call    Who are you speaking with? Patient    If it is not the patient, are they listed on an active communication consent form? Yes   What is the reason for this call? Patient is ok with the 4pm appt that's was offered       Nolvia RUIZ    9/1/23  9:37 AM  Note      Left message for patient to call the office to change appointment with Luzmaria Culver on 9/5/2023 from 3:00PM to 4:00PM per provider's request due to provider having an appointment at 2:45PM.            Does this require a call back? N/A   If a call back is required, please list best call back number [phone]   If a call back is required, advise that a message will be forwarded to their care team and someone will return their call as soon as possible. Did you relay this information to the patient?  Yes

## 2023-09-05 ENCOUNTER — TELEPHONE (OUTPATIENT)
Dept: SURGICAL ONCOLOGY | Facility: CLINIC | Age: 78
End: 2023-09-05

## 2023-09-05 ENCOUNTER — OFFICE VISIT (OUTPATIENT)
Dept: SURGICAL ONCOLOGY | Facility: CLINIC | Age: 78
End: 2023-09-05
Payer: COMMERCIAL

## 2023-09-05 VITALS
BODY MASS INDEX: 31.63 KG/M2 | HEIGHT: 63 IN | SYSTOLIC BLOOD PRESSURE: 108 MMHG | OXYGEN SATURATION: 94 % | RESPIRATION RATE: 16 BRPM | TEMPERATURE: 97.7 F | DIASTOLIC BLOOD PRESSURE: 70 MMHG | WEIGHT: 178.5 LBS | HEART RATE: 76 BPM

## 2023-09-05 DIAGNOSIS — N60.92 ATYPICAL DUCTAL HYPERPLASIA OF LEFT BREAST: ICD-10-CM

## 2023-09-05 DIAGNOSIS — Z86.000 HISTORY OF DUCTAL CARCINOMA IN SITU (DCIS) OF BREAST: Primary | ICD-10-CM

## 2023-09-05 PROCEDURE — 99213 OFFICE O/P EST LOW 20 MIN: CPT

## 2023-09-05 RX ORDER — IBUPROFEN 800 MG
1 TABLET ORAL DAILY
COMMUNITY

## 2023-09-05 NOTE — TELEPHONE ENCOUNTER
Called patient and rescheduled appointment for an earlier time at 3:30PM on 9/5/23 with [facility].

## 2023-09-05 NOTE — PROGRESS NOTES
Surgical Oncology Follow Up       [address] [facility] SURGICAL ONCOLOGY [place] [address] [address]  [facility] [address]    Holly Pauline  [birth year]  [id number]  [address] [facility]ICAL ONCOLOGY [facility] [address]    Chief Complaint   Patient presents with   • Follow-up       Assessment/Plan:  1. History of ductal carcinoma in situ (DCIS) of breast  - 6 mo follow up  - Mammo diagnostic bilateral w 3d & cad; Future    2. Atypical ductal hyperplasia of left breast       Discussion/Summary: Patient is a 27-year-old female presenting for a 6-month follow-up for right breast cancer diagnosed in February 2022. Pathology revealed DCIS ER/AK positive. She underwent a right breast lumpectomy with Dr. Clementina Langston. She elected to not pursue radiation therapy. She also has a history of atypical hyperplasia in the left breast which was excised in March2022. We will get her scheduled for mammogram in November. There were no concerns on her cbe. I will see the patient back in 6 months or sooner should the need arise. She was instructed to call with any questions or concerns prior to this time. All questions were answered today. History of Present Illness:     Oncology History   Atypical ductal hyperplasia of left breast   7/27/2012 Surgery    Left lumpectomy  Atypical ductal hyperplasia  Atypical lobular hyperplasia  Negative for malignancy     History of ductal carcinoma in situ (DCIS) of breast   2/11/2022 Biopsy    Right breast stereotactic biopsy  10 o'clock, middle  Ductal carcinoma in situ  Grade 2  , AK 90    Concordant. Malignancy may be locally multifocal, spanning 2 cm. US right axilla not performed. Left breast clear on 11/2021 mammo.      3/22/2022 Surgery    Right breast LIBBY  directed lumpectomy  Ductal carcinoma in situ  Grade 2  1.4 cm  Margins negative  0/1 Regional lymph node  Stage 0     4/7/2022 Genomic Testing    DCISion RT high risk (7.3)  10 year total risk with breast conserving surgery alone 31%  10 year total risk with breast conserving surgery and radiation 9%            -Interval History: Patient is a 81-year-old female presenting for a 6-month follow-up for right breast cancer diagnosed in February 2022. She is on obs. Patient denies changes on her breast exam. She denies persistent headaches, bone pain, back pain, shortness of breath, or abdominal pain. Review of Systems:  Review of Systems   Constitutional: Negative for activity change, appetite change, fatigue and unexpected weight change. Respiratory: Negative for cough and shortness of breath. Cardiovascular: Negative for chest pain. Gastrointestinal: Negative for abdominal pain, diarrhea, nausea and vomiting. Endocrine: Negative for heat intolerance. Musculoskeletal: Negative for arthralgias, back pain and myalgias. Skin: Negative for rash. Neurological: Negative for weakness and headaches. Hematological: Negative for adenopathy.        Patient Active Problem List   Diagnosis   • CKD (chronic kidney disease) stage 3, GFR 30-59 ml/min (HCC)   • COPD, severe (HCC)   • Essential hypertension   • Hypercholesteremia   • Renal cyst, acquired, right   • Squamous cell cancer of skin of buttock   • Environmental allergies   • Hypothyroidism   • Atypical ductal hyperplasia of left breast   • Mixed hyperlipidemia   • Nocturia   • Sciatica   • Spinal stenosis of lumbar region without neurogenic claudication   • Increased frequency of urination   • Moderate persistent asthma without complication   • Lesion of vocal cord   • JARETT (acute kidney injury) ([address])   • Cigarette nicotine dependence in remission   • Chronic cough   • Non-seasonal allergic rhinitis due to pollen   • History of ductal carcinoma in situ (DCIS) of breast   • Prediabetes   • Venous stasis of lower extremity   • Breast cancer in situ     Past Medical History:   Diagnosis Date • Asthma    • Breast cancer in situ    • Cancer ([address])     vocal cord cancer, s/p radiation (2008)   • Chronic kidney disease    • Colon polyp    • COPD (chronic obstructive pulmonary disease) ([address])    • Disease of thyroid gland    • Environmental allergies    • History of radiation therapy 2008   • Hyperlipidemia    • Hypertension    • Hypothyroidism    • Renal cyst      Past Surgical History:   Procedure Laterality Date   • BREAST BIOPSY Left 06/22/2012    in situ   • BREAST BIOPSY Right 02/11/2022    stereo   • BREAST LUMPECTOMY Left 07/24/2012    Left breast ADH/ALH   • BREAST LUMPECTOMY Right     DCIS cancer Procedure: BREAST LIBBY  DIRECTED LUMPECTOMY;  Surgeon: Celso Jacobson MD;  [facility];  Service: Surgical Oncology   • CHOLECYSTECTOMY     • COLONOSCOPY     • MAMMO NEEDLE LOCALIZATION LEFT (ALL INC) Right 03/16/2022   • MAMMO STEREOTACTIC BREAST BIOPSY RIGHT (ALL INC) Right 02/11/2022   •  [place] VOCAL CORD THER W/MICRO/TELESCOPE Left 03/29/2019    Procedure: MICROLARYNGOSCOPY AND EXCISION OF LEFT VOCAL CORD LESION;  Surgeon: Gilmer Cruz MD;  [facility];  Service: ENT   • SKIN CANCER EXCISION  07/07/2011    Squamous cell   • TONSILLECTOMY       Family History   Problem Relation Age of Onset   • Heart disease Mother    • Emphysema Mother    • Diabetes Mother    • Heart disease Father    • No Known Problems Daughter    • No Known Problems Maternal Grandmother    • COPD Maternal Grandfather    • No Known Problems Paternal Grandmother    • Stroke Paternal Grandfather    • No Known Problems Son    • No Known Problems Maternal Aunt    • No Known Problems Paternal Aunt    • No Known Problems Paternal Aunt    • No Known Problems Paternal Aunt      Social History     Socioeconomic History   • Marital status: /Civil Union     Spouse name: Not on file   • Number of children: Not on file   • Years of education: Not on file   • Highest education level: Not on file   Occupational History   • Not on file   Tobacco Use   • Smoking status: Former     Packs/day: 1.00     Years: 49.00     Total pack years: 49.00     Types: Cigarettes     Start date: 66     Quit date: 1/1/2008     Years since quitting: 15.6   • Smokeless tobacco: Never   Vaping Use   • Vaping Use: Never used   Substance and Sexual Activity   • Alcohol use: Yes     Comment: socially   • Drug use: No   • Sexual activity: Not Currently   Other Topics Concern   • Not on file   Social History Narrative    Drinks 2 cups of coffee a day      Social Determinants of Health     Financial Resource Strain: Not on file   Food Insecurity: Not on file   Transportation Needs: Not on file   Physical Activity: Not on file   Stress: Not on file   Social Connections: Not on file   Intimate Partner Violence: Not on file   Housing Stability: Not on file       Current Outpatient Medications:   •  albuterol (2.5 mg/3 mL) 0.083 % nebulizer solution, Take 3 mL (2.5 mg total) by nebulization every 6 (six) hours as needed for wheezing or shortness of breath, Disp: 360 mL, Rfl: 1  •  albuterol (Ventolin HFA) 90 mcg/act inhaler, Inhale 2 puffs every 6 (six) hours as needed for wheezing, Disp: 18 g, Rfl: 5  •  Calcium Carbonate-Vitamin D3 600-400 MG-UNIT TABS, Take 1 tablet by mouth daily with dinner , Disp: , Rfl:   •  Cholecalciferol (Vitamin D3) 10 MCG (400 UNIT) CAPS, Take 1 tablet by mouth daily 600mg, Disp: , Rfl:   •  Coenzyme Q10 (CO Q 10) 100 MG CAPS, Take 1 capsule by mouth daily with lunch , Disp: , Rfl:   •  diltiazem (CARDIZEM CD) 240 mg 24 hr capsule, Take 240 mg by mouth daily, Disp: , Rfl:   •  fexofenadine (ALLEGRA) 180 MG tablet, Take 1 tablet by mouth daily in the early morning , Disp: , Rfl:   •  fluticasone (FLONASE) 50 mcg/act nasal spray, 2 sprays into each nostril daily, Disp: 48 mL, Rfl: 3  •  fluticasone-umeclidinium-vilanterol (Trelegy Ellipta) 200-62.5-25 mcg/actuation AEPB inhaler, Inhale 1 puff every day; Rinse mouth after use., Disp: 60 each, Rfl: 5  •  guaiFENesin (MUCINEX) 600 mg 12 hr tablet, Take 1,200 mg by mouth in the morning, Disp: , Rfl:   •  hydrochlorothiazide (HYDRODIURIL) 25 mg tablet, Take 1 tablet by mouth daily, Disp: , Rfl:   •  ipratropium (ATROVENT) 0.06 % nasal spray, SPRAY 2 SPRAYS INTO EACH NOSTRIL 4 TIMES A DAY. NOT COVERED, Disp: 45 mL, Rfl: 3  •  montelukast (SINGULAIR) 10 mg tablet, TAKE 1 TABLET BY MOUTH EVERY DAY, Disp: 90 tablet, Rfl: 3  •  Multiple Vitamins-Minerals (MULTI FOR HER PO), Take 1 tablet by mouth daily with breakfast, Disp: , Rfl:   •  rosuvastatin (CRESTOR) 10 MG tablet, Take 10 mg by mouth daily, Disp: , Rfl:   •  SUPER B COMPLEX/C CAPS, Take 1 capsule by mouth daily with dinner , Disp: , Rfl:   •  erythromycin (ILOTYCIN) ophthalmic ointment, APPLY 1 APPLICATION IN RIGHT EYE AT BEDTIME (Patient not taking: Reported on 5/30/2023), Disp: , Rfl:   •  levothyroxine 100 mcg tablet, TAKE 1 TABLET (100 MCG TOTAL) BY MOUTH DAILY. DX CODE E03.9 (Patient not taking: Reported on 5/30/2023), Disp: , Rfl:   •  levothyroxine 112 mcg tablet, Take 112 mcg by mouth daily in the early morning , Disp: , Rfl:   No Known Allergies  Vitals:    09/05/23 1510   BP: 108/70   Pulse: 76   Resp: 16   Temp: 97.7 °F (36.5 °C)   SpO2: 94%       Physical Exam  Constitutional:       General: She is not in acute distress. Appearance: Normal appearance. Cardiovascular:      Rate and Rhythm: Normal rate and regular rhythm. Pulses: Normal pulses. Heart sounds: Normal heart sounds. Pulmonary:      Effort: Pulmonary effort is normal.      Breath sounds: Normal breath sounds. Chest:      Chest wall: No mass. Breasts:     Right: No swelling, bleeding, inverted nipple, mass, nipple discharge, skin change or tenderness. Left: No swelling, bleeding, inverted nipple, mass, nipple discharge, skin change or tenderness. Abdominal:      General: Abdomen is flat. Palpations: Abdomen is soft.    Lymphadenopathy: Upper Body:      Right upper body: No supraclavicular, axillary or pectoral adenopathy. Left upper body: No supraclavicular, axillary or pectoral adenopathy. Skin:     General: Skin is warm. Neurological:      General: No focal deficit present. Mental Status: She is alert and oriented to person, place, and time. Psychiatric:         Mood and Affect: Mood normal.         Behavior: Behavior normal.           Results:    Imaging  No results found. I reviewed the above imaging data. Advance Care Planning/Advance Directives:  Discussed disease status, cancer treatment plans and/or cancer treatment goals with the patient.

## 2023-10-30 ENCOUNTER — TELEPHONE (OUTPATIENT)
Dept: PULMONOLOGY | Facility: CLINIC | Age: 78
End: 2023-10-30

## 2023-10-30 NOTE — TELEPHONE ENCOUNTER
Patient called asking if we had any RSV Vaccines in the office or if she needed to go through her Pharmacy.  Please advise

## 2023-11-10 ENCOUNTER — HOSPITAL ENCOUNTER (OUTPATIENT)
Dept: MAMMOGRAPHY | Facility: CLINIC | Age: 78
Discharge: HOME | End: 2023-11-10
Payer: COMMERCIAL

## 2023-11-10 DIAGNOSIS — Z86.000 HISTORY OF DUCTAL CARCINOMA IN SITU (DCIS) OF BREAST: ICD-10-CM

## 2023-11-10 DIAGNOSIS — J44.9 COPD, SEVERE (HCC): ICD-10-CM

## 2023-11-10 PROCEDURE — 77066 DX MAMMO INCL CAD BI: CPT

## 2023-11-10 PROCEDURE — G0279 TOMOSYNTHESIS, MAMMO: HCPCS

## 2023-11-10 RX ORDER — FLUTICASONE FUROATE, UMECLIDINIUM BROMIDE AND VILANTEROL TRIFENATATE 200; 62.5; 25 UG/1; UG/1; UG/1
POWDER RESPIRATORY (INHALATION)
Qty: 60 EACH | Refills: 5 | Status: SHIPPED | OUTPATIENT
Start: 2023-11-10

## 2023-11-27 ENCOUNTER — TELEPHONE (OUTPATIENT)
Dept: PULMONOLOGY | Facility: CLINIC | Age: 78
End: 2023-11-27

## 2023-11-27 DIAGNOSIS — J44.9 COPD, SEVERE (HCC): Primary | ICD-10-CM

## 2023-11-27 RX ORDER — BENZONATATE 100 MG/1
200 CAPSULE ORAL 3 TIMES DAILY PRN
Qty: 20 CAPSULE | Refills: 0 | Status: SHIPPED | OUTPATIENT
Start: 2023-11-27

## 2023-11-27 RX ORDER — DOXYCYCLINE 100 MG/1
100 TABLET ORAL 2 TIMES DAILY
Qty: 10 TABLET | Refills: 0 | Status: SHIPPED | OUTPATIENT
Start: 2023-11-27 | End: 2023-12-02

## 2023-11-27 RX ORDER — PREDNISONE 10 MG/1
TABLET ORAL
Qty: 20 TABLET | Refills: 0 | Status: SHIPPED | OUTPATIENT
Start: 2023-11-27

## 2023-11-27 NOTE — TELEPHONE ENCOUNTER
Eldon Eid would like a return call regarding     What is the reason for the call/chief complaint? COUGH    What additional symptoms are present or absent? SOB, no   Are they on O2? No Pulse O2 restingN/A When walkingN/A   chest pain/tightness, no  wheezing, no  Cough, yes  mucous production,yes. What color is it YELLOW/GREEN  fevers/chills, no  weight gain, no  Swelling no  Pain no, does it hurt when breathing or all the time? N/A    When did they start/how long have they been going on? 11/22/23    Constant or intermittent; if intermittent describe YES    What makes it better or worse? N/A    Have you been exposed to anyone that is sick? No    Have you been tested for COVID recently? no    Check current pulmonary medications INHALER  frequency of use DAILY    Have they had any other treatment or testing for this problem elsewhere? N/A    Recent steroids? No    Recent Antibiotics? No     Last office visit? 5/30/2024    Patient pharmacy?    [facility] #[id number] - [place], [address] [place] [address]  Phone: [phone]  Fax: [phone] or 90 day supply

## 2023-11-27 NOTE — TELEPHONE ENCOUNTER
Pt LVM stating she has a horrible cough and is coughing up a lot dark yellow mucus . She feels she needs antibiotics.

## 2023-12-08 ENCOUNTER — OFFICE VISIT (OUTPATIENT)
Dept: PULMONOLOGY | Facility: CLINIC | Age: 78
End: 2023-12-08
Payer: COMMERCIAL

## 2023-12-08 VITALS
BODY MASS INDEX: 31.39 KG/M2 | SYSTOLIC BLOOD PRESSURE: 148 MMHG | OXYGEN SATURATION: 95 % | WEIGHT: 177.2 LBS | HEART RATE: 72 BPM | TEMPERATURE: 96.5 F | DIASTOLIC BLOOD PRESSURE: 80 MMHG

## 2023-12-08 DIAGNOSIS — J45.909 UNCOMPLICATED ASTHMA, UNSPECIFIED ASTHMA SEVERITY, UNSPECIFIED WHETHER PERSISTENT: ICD-10-CM

## 2023-12-08 DIAGNOSIS — J45.40 MODERATE PERSISTENT ASTHMA WITHOUT COMPLICATION: ICD-10-CM

## 2023-12-08 DIAGNOSIS — J44.9 COPD, SEVERE (HCC): Primary | ICD-10-CM

## 2023-12-08 DIAGNOSIS — J30.1 NON-SEASONAL ALLERGIC RHINITIS DUE TO POLLEN: ICD-10-CM

## 2023-12-08 PROCEDURE — 99214 OFFICE O/P EST MOD 30 MIN: CPT | Performed by: INTERNAL MEDICINE

## 2023-12-08 RX ORDER — FLUTICASONE FUROATE, UMECLIDINIUM BROMIDE AND VILANTEROL TRIFENATATE 200; 62.5; 25 UG/1; UG/1; UG/1
POWDER RESPIRATORY (INHALATION)
Qty: 180 EACH | Refills: 3 | Status: SHIPPED | OUTPATIENT
Start: 2023-12-08

## 2023-12-08 RX ORDER — FLUTICASONE PROPIONATE 50 MCG
2 SPRAY, SUSPENSION (ML) NASAL DAILY
Qty: 48 ML | Refills: 3 | Status: SHIPPED | OUTPATIENT
Start: 2023-12-08

## 2023-12-08 RX ORDER — IPRATROPIUM BROMIDE 42 UG/1
2 SPRAY, METERED NASAL 3 TIMES DAILY
Qty: 45 ML | Refills: 3 | Status: SHIPPED | OUTPATIENT
Start: 2023-12-08

## 2023-12-08 RX ORDER — MONTELUKAST SODIUM 10 MG/1
10 TABLET ORAL DAILY
Qty: 90 TABLET | Refills: 3 | Status: SHIPPED | OUTPATIENT
Start: 2023-12-08

## 2023-12-08 NOTE — ASSESSMENT & PLAN NOTE
She will continue with Trelegy Ellipta once daily and albuterol as needed.   She also takes Singulair once daily

## 2023-12-08 NOTE — PROGRESS NOTES
Pulmonary Follow Up Note   Chris Johnston 69 y.o. female MRN: [id number]  12/8/2023      Assessment/Plan: Moderate persistent asthma without complication  She will continue with Trelegy Ellipta once daily and albuterol as needed. She also takes Singulair once daily    Non-seasonal allergic rhinitis due to pollen  Continue Atrovent nasal spray    She will follow-up in 6 months or sooner if needed    Visit orders:    Diagnoses and all orders for this visit:    COPD, severe ([address])  -     fluticasone-umeclidinium-vilanterol (Trelegy Ellipta) 200-62.5-25 mcg/actuation AEPB inhaler; Inhale 1 puff every day; Rinse mouth after use. -     fluticasone (FLONASE) 50 mcg/act nasal spray; 2 sprays into each nostril daily    Moderate persistent asthma without complication  -     fluticasone (FLONASE) 50 mcg/act nasal spray; 2 sprays into each nostril daily    Uncomplicated asthma, unspecified asthma severity, unspecified whether persistent  -     montelukast (SINGULAIR) 10 mg tablet; Take 1 tablet (10 mg total) by mouth daily    Non-seasonal allergic rhinitis due to pollen  -     ipratropium (ATROVENT) 0.06 % nasal spray; 2 sprays into each nostril 3 (three) times a day      History of Present Illness   HPI:  Chris Johnston is a 69 y.o. female who is here today for follow-up regarding moderate persistent asthma/COPD. She is overall stable with Trelegy Ellipta. She had recent exacerbation requiring a course of antibiotics. She did not require systemic corticosteroids. She denies cough, wheeze or sputum production. She denies chest pain or shortness of breath.   She denies lower extremity edema    Review of Systems others negative    Medical, Family and Social history reviewed and updated as appropriate    Historical Information   Past Medical History:   Diagnosis Date    Asthma     Breast cancer ([address] [facility])     Breast cancer in situ     Cancer ([address] [facility])     vocal cord cancer, s/p radiation (2008)    Chronic kidney disease     Colon polyp     COPD (chronic obstructive pulmonary disease) ([address])     Disease of thyroid gland     Environmental allergies     History of radiation therapy 2008    Hyperlipidemia     Hypertension     Hypothyroidism     Renal cyst      Past Surgical History:   Procedure Laterality Date    BREAST BIOPSY Left 06/22/2012    in situ    BREAST BIOPSY Right 02/11/2022    stereo    BREAST LUMPECTOMY Left 07/24/2012    Left breast ADH/ALH    BREAST LUMPECTOMY Right     DCIS cancer Procedure: BREAST LIBBY  DIRECTED LUMPECTOMY;  Surgeon: Rita Fontenot MD;  [facility];  Service: Surgical Oncology    CHOLECYSTECTOMY      COLONOSCOPY      MAMMO NEEDLE LOCALIZATION LEFT (ALL INC) Right 03/16/2022    MAMMO STEREOTACTIC BREAST BIOPSY RIGHT (ALL INC) Right 02/11/2022     [place] VOCAL CORD THER W/MICRO/TELESCOPE Left 03/29/2019    Procedure: MICROLARYNGOSCOPY AND EXCISION OF LEFT VOCAL CORD LESION;  Surgeon: Nicole Leyva MD;  Location[facility];  Service: ENT    SKIN CANCER EXCISION  07/07/2011    Squamous cell    TONSILLECTOMY       Family History   Problem Relation Age of Onset    Heart disease Mother     Emphysema Mother     Diabetes Mother     Heart disease Father     No Known Problems Daughter     No Known Problems Maternal Grandmother     COPD Maternal Grandfather     No Known Problems Paternal Grandmother     Stroke Paternal Grandfather     No Known Problems Son     No Known Problems Maternal Aunt     No Known Problems Paternal Aunt     No Known Problems Paternal Aunt     No Known Problems Paternal Aunt        Social History     Tobacco Use   Smoking Status Former    Packs/day: 1.00    Years: 49.00    Total pack years: 49.00    Types: Cigarettes    Start date: 66    Quit date: 1/1/2008    Years since quitting: 15.9   Smokeless Tobacco Never         Meds/Allergies     Current Outpatient Medications:     albuterol (2.5 mg/3 mL) 0.083 % nebulizer solution, Take 3 mL (2.5 mg total) by nebulization every 6 (six) hours as needed for wheezing or shortness of breath, Disp: 360 mL, Rfl: 1    albuterol (Ventolin HFA) 90 mcg/act inhaler, Inhale 2 puffs every 6 (six) hours as needed for wheezing, Disp: 18 g, Rfl: 5    Calcium Carbonate-Vitamin D3 600-400 MG-UNIT TABS, Take 1 tablet by mouth daily with dinner , Disp: , Rfl:     Coenzyme Q10 (CO Q 10) 100 MG CAPS, Take 1 capsule by mouth daily with lunch , Disp: , Rfl:     diltiazem (CARDIZEM CD) 240 mg 24 hr capsule, Take 240 mg by mouth daily, Disp: , Rfl:     fexofenadine (ALLEGRA) 180 MG tablet, Take 1 tablet by mouth daily in the early morning , Disp: , Rfl:     fluticasone (FLONASE) 50 mcg/act nasal spray, 2 sprays into each nostril daily, Disp: 48 mL, Rfl: 3    fluticasone-umeclidinium-vilanterol (Trelegy Ellipta) 200-62.5-25 mcg/actuation AEPB inhaler, Inhale 1 puff every day; Rinse mouth after use., Disp: 180 each, Rfl: 3    guaiFENesin (MUCINEX) 600 mg 12 hr tablet, Take 1,200 mg by mouth in the morning, Disp: , Rfl:     hydrochlorothiazide (HYDRODIURIL) 25 mg tablet, Take 1 tablet by mouth daily, Disp: , Rfl:     ipratropium (ATROVENT) 0.06 % nasal spray, 2 sprays into each nostril 3 (three) times a day, Disp: 45 mL, Rfl: 3    levothyroxine 112 mcg tablet, Take 112 mcg by mouth daily in the early morning , Disp: , Rfl:     montelukast (SINGULAIR) 10 mg tablet, Take 1 tablet (10 mg total) by mouth daily, Disp: 90 tablet, Rfl: 3    Multiple Vitamins-Minerals (MULTI FOR HER PO), Take 1 tablet by mouth daily with breakfast, Disp: , Rfl:     rosuvastatin (CRESTOR) 10 MG tablet, Take 10 mg by mouth daily, Disp: , Rfl:     SUPER B COMPLEX/C CAPS, Take 1 capsule by mouth daily with dinner , Disp: , Rfl:     benzonatate (TESSALON PERLES) 100 mg capsule, Take 2 capsules (200 mg total) by mouth 3 (three) times a day as needed for cough (Patient not taking: Reported on 12/8/2023), Disp: 20 capsule, Rfl: 0    Cholecalciferol (Vitamin D3) 10 MCG (400 UNIT) CAPS, Take 1 tablet by mouth daily 600mg (Patient not taking: Reported on 12/8/2023), Disp: , Rfl:     erythromycin (ILOTYCIN) ophthalmic ointment, APPLY 1 APPLICATION IN RIGHT EYE AT BEDTIME (Patient not taking: Reported on 12/8/2023), Disp: , Rfl:     levothyroxine 100 mcg tablet, TAKE 1 TABLET (100 MCG TOTAL) BY MOUTH DAILY. DX CODE E03.9 (Patient not taking: Reported on 12/8/2023), Disp: , Rfl:     predniSONE 10 mg tablet, 4 tabs x 5 days (Patient not taking: Reported on 12/8/2023), Disp: 20 tablet, Rfl: 0  No Known Allergies    Vitals: Blood pressure 148/80, pulse 72, temperature (!) 96.5 °F (35.8 °C), weight 80.4 kg (177 lb 3.2 oz), SpO2 95 %. Body mass index is 31.39 kg/m². Oxygen Therapy  SpO2: 95 %    Physical Exam   Physical Exam  Constitutional:       General: She is not in acute distress. Appearance: Normal appearance. HENT:      Head: Normocephalic. Mouth/Throat:      Pharynx: No oropharyngeal exudate. Eyes:      General: No scleral icterus. Neck:      Vascular: No JVD. Cardiovascular:      Rate and Rhythm: Normal rate and regular rhythm. Pulmonary:      Breath sounds: No wheezing, rhonchi or rales. Abdominal:      Palpations: Abdomen is soft. Tenderness: There is no abdominal tenderness. Musculoskeletal:         General: No deformity. Cervical back: Neck supple. Lymphadenopathy:      Cervical: No cervical adenopathy. Skin:     General: Skin is warm and dry. Neurological:      Mental Status: She is alert and oriented to person, place, and time. Psychiatric:         Mood and Affect: Mood normal.         Labs: I have personally reviewed pertinent lab results.   Lab Results   Component Value Date    WBC 8.46 03/03/2022    HGB 14.6 03/03/2022    HCT 44.3 03/03/2022    MCV 96 03/03/2022     03/03/2022     Lab Results   Component Value Date    CALCIUM 9.3 03/03/2022    K 3.7 03/03/2022    CO2 30 03/03/2022    CL 97 (L) 03/03/2022    BUN 15 03/03/2022    CREATININE 1.09 03/03/2022     No results found for: "IGE"  Lab Results   Component Value Date    ALT 27 03/03/2022    AST 19 03/03/2022    ALKPHOS 92 03/03/2022     Imaging and other studies: I have personally reviewed pertinent reports. and I have personally reviewed pertinent films in PACS CT of the chest on 11/17/2022 shows no nodules    Pulmonary function testing:  Performed 10/20/2021 and personally interpreted  FEV1/FVC ratio 49%   FEV1 48% predicted  FVC 77% predicted. No response to bronchodilators   % predicted   % predicted  DLCO corrected for hemoglobin 49 % predicted.   Severe obstruction, mild air trapping and severe diffusion impairment

## 2023-12-11 DIAGNOSIS — J44.9 COPD, SEVERE (HCC): ICD-10-CM

## 2023-12-11 RX ORDER — FLUTICASONE FUROATE, UMECLIDINIUM BROMIDE AND VILANTEROL TRIFENATATE 200; 62.5; 25 UG/1; UG/1; UG/1
POWDER RESPIRATORY (INHALATION)
Qty: 180 EACH | Refills: 3 | Status: SHIPPED | OUTPATIENT
Start: 2023-12-11

## 2024-01-08 ENCOUNTER — TELEPHONE (OUTPATIENT)
Dept: NEPHROLOGY | Facility: CLINIC | Age: 79
End: 2024-01-08

## 2024-01-08 NOTE — TELEPHONE ENCOUNTER
Left voicemail for the patient reminding to please complete labwork prior to 1/18 appointment with Dr. Haider. Advised patient to call back with any questions or concerns.

## 2024-01-18 ENCOUNTER — OFFICE VISIT (OUTPATIENT)
Dept: NEPHROLOGY | Facility: CLINIC | Age: 79
End: 2024-01-18
Payer: COMMERCIAL

## 2024-01-18 VITALS
BODY MASS INDEX: 30.48 KG/M2 | SYSTOLIC BLOOD PRESSURE: 134 MMHG | DIASTOLIC BLOOD PRESSURE: 72 MMHG | HEART RATE: 78 BPM | HEIGHT: 63 IN | WEIGHT: 172 LBS

## 2024-01-18 DIAGNOSIS — N28.1 RENAL CYST, ACQUIRED, RIGHT: ICD-10-CM

## 2024-01-18 DIAGNOSIS — R73.03 PREDIABETES: ICD-10-CM

## 2024-01-18 DIAGNOSIS — I10 ESSENTIAL HYPERTENSION: ICD-10-CM

## 2024-01-18 DIAGNOSIS — N18.31 STAGE 3A CHRONIC KIDNEY DISEASE (CKD) (HCC): Primary | ICD-10-CM

## 2024-01-18 PROCEDURE — 99214 OFFICE O/P EST MOD 30 MIN: CPT | Performed by: INTERNAL MEDICINE

## 2024-01-18 NOTE — PROGRESS NOTES
NEPHROLOGY OUTPATIENT PROGRESS NOTE   Lori Samano 78 y.o. female MRN: [id number]  DATE: 1/18/2024  Reason for visit:   Chief Complaint   Patient presents with    Chronic Kidney Disease    Follow-up        Patient Instructions   1. chronic kidney disease, stage 3a per CKD EPI equation (eGFR 45ml/min) likely d/t aging kidney +/- hypertension nephrosclerosis  - sCr 1.2 as of 12/28/23. sCr ranges 1.1-1.3, stable since 2016  -UA and urine microscopy shows leukocyte esterase and few bacteria, otherwise bland  -renal ultrasound shows normal kidneys with right renal cyst  -CKD may be due to HTN nephrosclerosis +/- physiologic CKD of aging  -urine protein:Cr negligible as of June 2023  - FLC 1.55 - acceptable for CKD, SPEP/UPEP negative for monoclonal gammopathy  -as no significant proteinuria or hematuria on UA, will defer glomerular work up  -f/u BMP, UA, UpCr and microalb:Cr in 6 months and again in 12 months prior to next office visit  -suspect low risk for progression of CKD    2. Right renal cyst, acquired - stable as of renal ultrasound as of Feb. 2019. Size 1.7-1.8cm. As cyst stable, will defer further imaging for now.      3. HTN - BP well controlled for age/CKD  -on low salt diet  -continue diltiazem XR 240mg daily, hydrochlorothiazide 25mg daily  -off lisinopril  -Should monitor BP at home. If BP > 140/90 consistently call the nephrology office.     4. CKD BMD - Mag/phos acceptable as of Dec. 2023. PTH was 48 in Dec. 2023. Continue calcium vitamin D3 combination pill daily.      5. elevated total CO2 - bicarb 31 as of 12/28/23. ? D/t HCTZ. Monitor BMP. Recommend increased hydration     RTC in 12 months. Obtain bloodwork and urine testing in 6 months and again before next office visit.      Lori was seen today for chronic kidney disease and follow-up.    Diagnoses and all orders for this visit:    Stage 3a chronic kidney disease (CKD) ([facility])  -     Basic metabolic panel; Standing  -     Urinalysis with  microscopic; Standing  -     Protein / creatinine ratio, urine; Standing  -     Albumin / creatinine urine ratio; Standing  -     Basic metabolic panel  -     Urinalysis with microscopic  -     Protein / creatinine ratio, urine  -     Albumin / creatinine urine ratio    Essential hypertension    Renal cyst, acquired, right    Prediabetes        Assessment/Plan:  1. chronic kidney disease, stage 3a per CKD EPI equation (eGFR 45ml/min) likely d/t aging kidney +/- hypertension nephrosclerosis  - sCr 1.2 as of 12/28/23. sCr ranges 1.1-1.3, stable since 2016  -UA and urine microscopy shows leukocyte esterase and few bacteria, otherwise bland  -renal ultrasound shows normal kidneys with right renal cyst  -CKD may be due to HTN nephrosclerosis +/- physiologic CKD of aging  -urine protein:Cr negligible as of June 2023  - FLC 1.55 - acceptable for CKD, SPEP/UPEP negative for monoclonal gammopathy  -as no significant proteinuria or hematuria on UA, will defer glomerular work up  -f/u BMP, UA, UpCr and microalb:Cr in 6 months and again in 12 months prior to next office visit  -suspect low risk for progression of CKD    2. Right renal cyst, acquired - stable as of renal ultrasound as of Feb. 2019. Size 1.7-1.8cm. As cyst stable, will defer further imaging for now.      3. HTN - BP well controlled for age/CKD  -on low salt diet  -continue diltiazem XR 240mg daily, hydrochlorothiazide 25mg daily  -off lisinopril  -Should monitor BP at home. If BP > 140/90 consistently call the nephrology office.     4. CKD BMD - Mag/phos acceptable as of Dec. 2023. PTH was 48 in Dec. 2023. Continue calcium vitamin D3 combination pill daily.      5. elevated total CO2 - bicarb 31 as of 12/28/23. ? D/t HCTZ. Monitor BMP. Recommend increased hydration     RTC in 12 months. Obtain bloodwork and urine testing in 6 months      SUBJECTIVE / INTERVAL HISTORY:  78 y.o. female presents in follow up of CKD.     Lori Samano denies any recent  "illness/hospitalizations/medication changes since last office visit.    Denies NSAID use.  HTN - does not check BP at home  Does have dry mouth from using atrovent. Uses humidifier.    Review of Systems   Constitutional:  Negative for chills and fever.   HENT:  Negative for sore throat and trouble swallowing.    Eyes:  Negative for visual disturbance.   Respiratory:  Negative for cough and shortness of breath.    Cardiovascular:  Negative for chest pain and leg swelling.   Gastrointestinal:  Positive for constipation. Negative for abdominal pain, diarrhea, nausea and vomiting.   Endocrine: Negative for polyuria.   Genitourinary:  Negative for difficulty urinating, dysuria and hematuria.   Musculoskeletal:  Negative for back pain and neck pain.   Skin:  Negative for rash.   Neurological:  Positive for dizziness (occasional). Negative for light-headedness and numbness.   Psychiatric/Behavioral:  The patient is not nervous/anxious.        OBJECTIVE:  /72 (BP Location: Left arm, Patient Position: Sitting, Cuff Size: Standard)   Pulse 78   Ht 5' 3\" (1.6 m)   Wt 78 kg (172 lb)   BMI 30.47 kg/m²  Body mass index is 30.47 kg/m².    Physical exam:  Physical Exam  Vitals and nursing note reviewed.   Constitutional:       General: She is not in acute distress.     Appearance: Normal appearance. She is well-developed. She is not diaphoretic.   HENT:      Head: Normocephalic and atraumatic.      Nose: Nose normal.      Mouth/Throat:      Mouth: Mucous membranes are dry.      Pharynx: No oropharyngeal exudate.   Eyes:      General: No scleral icterus.        Right eye: No discharge.         Left eye: No discharge.      Comments: eyeglasses   Neck:      Thyroid: No thyromegaly.   Cardiovascular:      Rate and Rhythm: Normal rate and regular rhythm.      Heart sounds: No murmur heard.  Pulmonary:      Effort: Pulmonary effort is normal. No respiratory distress.      Breath sounds: Normal breath sounds. No wheezing. "   Abdominal:      General: Bowel sounds are normal. There is no distension.      Palpations: Abdomen is soft.   Musculoskeletal:         General: No swelling.      Cervical back: Normal range of motion and neck supple.   Skin:     General: Skin is warm and dry.      Coloration: Skin is not jaundiced.      Findings: No rash.   Neurological:      General: No focal deficit present.      Mental Status: She is alert.      Motor: No abnormal muscle tone.      Comments: awake   Psychiatric:         Mood and Affect: Mood normal.         Behavior: Behavior normal.         Medications:    Current Outpatient Medications:     albuterol (2.5 mg/3 mL) 0.083 % nebulizer solution, Take 3 mL (2.5 mg total) by nebulization every 6 (six) hours as needed for wheezing or shortness of breath, Disp: 360 mL, Rfl: 1    albuterol (Ventolin HFA) 90 mcg/act inhaler, Inhale 2 puffs every 6 (six) hours as needed for wheezing, Disp: 18 g, Rfl: 5    Calcium Carbonate-Vitamin D3 600-400 MG-UNIT TABS, Take 1 tablet by mouth daily with dinner , Disp: , Rfl:     Coenzyme Q10 (CO Q 10) 100 MG CAPS, Take 1 capsule by mouth daily with lunch , Disp: , Rfl:     diltiazem (CARDIZEM CD) 240 mg 24 hr capsule, Take 240 mg by mouth daily, Disp: , Rfl:     fexofenadine (ALLEGRA) 180 MG tablet, Take 1 tablet by mouth daily in the early morning , Disp: , Rfl:     fluticasone (FLONASE) 50 mcg/act nasal spray, 2 sprays into each nostril daily, Disp: 48 mL, Rfl: 3    fluticasone-umeclidinium-vilanterol (Trelegy Ellipta) 200-62.5-25 mcg/actuation AEPB inhaler, Inhale 1 puff every day; Rinse mouth after use., Disp: 180 each, Rfl: 3    guaiFENesin (MUCINEX) 600 mg 12 hr tablet, Take 1,200 mg by mouth in the morning, Disp: , Rfl:     hydrochlorothiazide (HYDRODIURIL) 25 mg tablet, Take 1 tablet by mouth daily, Disp: , Rfl:     ipratropium (ATROVENT) 0.06 % nasal spray, 2 sprays into each nostril 3 (three) times a day, Disp: 45 mL, Rfl: 3    montelukast (SINGULAIR) 10 mg  "tablet, Take 1 tablet (10 mg total) by mouth daily, Disp: 90 tablet, Rfl: 3    Multiple Vitamins-Minerals (MULTI FOR HER PO), Take 1 tablet by mouth daily with breakfast, Disp: , Rfl:     rosuvastatin (CRESTOR) 10 MG tablet, Take 10 mg by mouth daily, Disp: , Rfl:     SUPER B COMPLEX/C CAPS, Take 1 capsule by mouth daily with dinner , Disp: , Rfl:     benzonatate (TESSALON PERLES) 100 mg capsule, Take 2 capsules (200 mg total) by mouth 3 (three) times a day as needed for cough (Patient not taking: Reported on 12/8/2023), Disp: 20 capsule, Rfl: 0    Cholecalciferol (Vitamin D3) 10 MCG (400 UNIT) CAPS, Take 1 tablet by mouth daily 600mg (Patient not taking: Reported on 12/8/2023), Disp: , Rfl:     erythromycin (ILOTYCIN) ophthalmic ointment, APPLY 1 APPLICATION IN RIGHT EYE AT BEDTIME (Patient not taking: Reported on 12/8/2023), Disp: , Rfl:     levothyroxine 100 mcg tablet, TAKE 1 TABLET (100 MCG TOTAL) BY MOUTH DAILY. DX CODE E03.9 (Patient not taking: Reported on 12/8/2023), Disp: , Rfl:     levothyroxine 112 mcg tablet, Take 112 mcg by mouth daily in the early morning , Disp: , Rfl:     predniSONE 10 mg tablet, 4 tabs x 5 days (Patient not taking: Reported on 12/8/2023), Disp: 20 tablet, Rfl: 0    Allergies:  Allergies as of 01/18/2024    (No Known Allergies)       The following portions of the patient's history were reviewed and updated as appropriate: past family history, past surgical history and problem list.    Laboratory Results:  Lab Results   Component Value Date    SODIUM 137 03/03/2022    K 3.7 03/03/2022    CL 97 (L) 03/03/2022    CO2 30 03/03/2022    BUN 15 03/03/2022    CREATININE 1.09 03/03/2022    GLUC 72 03/03/2022    CALCIUM 9.3 03/03/2022        Lab Results   Component Value Date    CALCIUM 9.3 03/03/2022       Portions of the record may have been created with voice recognition software.  Occasional wrong word or \"sound a like\" substitutions may have occurred due to the inherent limitations of " voice recognition software.  Read the chart carefully and recognize, using context, where substitutions have occurred.

## 2024-01-18 NOTE — PATIENT INSTRUCTIONS
1. chronic kidney disease, stage 3a per CKD EPI equation (eGFR 45ml/min) likely d/t aging kidney +/- hypertension nephrosclerosis  - sCr 1.2 as of 12/28/23. sCr ranges 1.1-1.3, stable since 2016  -UA and urine microscopy shows leukocyte esterase and few bacteria, otherwise bland  -renal ultrasound shows normal kidneys with right renal cyst  -CKD may be due to HTN nephrosclerosis +/- physiologic CKD of aging  -urine protein:Cr negligible as of June 2023  - FLC 1.55 - acceptable for CKD, SPEP/UPEP negative for monoclonal gammopathy  -as no significant proteinuria or hematuria on UA, will defer glomerular work up  -f/u BMP, UA, UpCr and microalb:Cr in 6 months and again in 12 months prior to next office visit  -suspect low risk for progression of CKD    2. Right renal cyst, acquired - stable as of renal ultrasound as of Feb. 2019. Size 1.7-1.8cm. As cyst stable, will defer further imaging for now.      3. HTN - BP well controlled for age/CKD  -on low salt diet  -continue diltiazem XR 240mg daily, hydrochlorothiazide 25mg daily  -off lisinopril  -Should monitor BP at home. If BP > 140/90 consistently call the nephrology office.     4. CKD BMD - Mag/phos acceptable as of Dec. 2023. PTH was 48 in Dec. 2023. Continue calcium vitamin D3 combination pill daily.      5. elevated total CO2 - bicarb 31 as of 12/28/23. ? D/t HCTZ. Monitor BMP. Recommend increased hydration     RTC in 12 months. Obtain bloodwork and urine testing in 6 months and again before next office visit.

## 2024-03-05 ENCOUNTER — OFFICE VISIT (OUTPATIENT)
Dept: SURGICAL ONCOLOGY | Facility: CLINIC | Age: 79
End: 2024-03-05
Payer: COMMERCIAL

## 2024-03-05 VITALS
SYSTOLIC BLOOD PRESSURE: 122 MMHG | OXYGEN SATURATION: 93 % | HEART RATE: 75 BPM | RESPIRATION RATE: 15 BRPM | TEMPERATURE: 97.6 F | WEIGHT: 176 LBS | BODY MASS INDEX: 31.18 KG/M2 | DIASTOLIC BLOOD PRESSURE: 70 MMHG | HEIGHT: 63 IN

## 2024-03-05 DIAGNOSIS — Z86.000 HISTORY OF DUCTAL CARCINOMA IN SITU (DCIS) OF BREAST: Primary | ICD-10-CM

## 2024-03-05 DIAGNOSIS — N60.92 ATYPICAL DUCTAL HYPERPLASIA OF LEFT BREAST: ICD-10-CM

## 2024-03-05 PROCEDURE — 99213 OFFICE O/P EST LOW 20 MIN: CPT

## 2024-03-05 NOTE — PROGRESS NOTES
Surgical Oncology Follow Up       [facility]  [facility] [place]  [address] [facility] [address]  [place] [address]    Lori Samano  1945  [id number]      Chief Complaint   Patient presents with   • Follow-up       Assessment/Plan:  1. History of ductal carcinoma in situ (DCIS) of breast  - 6 mo follow up  - Mammo diagnostic bilateral w 3d & cad; Future    2. Atypical ductal hyperplasia of left breast      Discussion/Summary: Patient is a 78-year-old female presenting for a 6-month follow-up for right breast cancer diagnosed in February 2022. Pathology revealed DCIS ER/CO positive. She underwent a right breast lumpectomy with Dr. Carlton. She elected to not pursue radiation therapy. She also has a history of atypical hyperplasia in the left breast which was excised in March 2022. She had a b/l dx mammogram on 11/10/23 which was BIRADS 2, category 1 density. There were no concerns on her cbe. I will see the patient back in 6 months or sooner should the need arise. She was instructed to call with any questions or concerns prior to this time. All questions were answered today.       History of Present Illness:     -Interval History: Patient is a 78-year-old female presenting for a 6-month follow-up for right breast cancer diagnosed in February 2022. She had a b/l dx mammogram on 11/10/23 which was BIRADS 2, category 1 density. Patient denies changes on her breast exam. She denies persistent headaches, bone pain, back pain, shortness of breath, or abdominal pain.         Review of Systems:  Review of Systems   Constitutional:  Negative for activity change, appetite change, fatigue and unexpected weight change.   Respiratory:  Negative for cough and shortness of breath.    Cardiovascular:  Negative for chest pain.   Gastrointestinal:  Negative for abdominal pain, diarrhea, nausea and vomiting.   Endocrine: Negative for heat intolerance.   Musculoskeletal:  Negative for  arthralgias, back pain and myalgias.   Skin:  Negative for rash.   Neurological:  Negative for weakness and headaches.   Hematological:  Negative for adenopathy.       Patient Active Problem List   Diagnosis   • CKD (chronic kidney disease) stage 3, GFR 30-59 ml/min (HCC)   • COPD, severe (HCC)   • Essential hypertension   • Hypercholesteremia   • Renal cyst, acquired, right   • Squamous cell cancer of skin of buttock   • Environmental allergies   • Hypothyroidism   • Atypical ductal hyperplasia of left breast   • Mixed hyperlipidemia   • Nocturia   • Sciatica   • Spinal stenosis of lumbar region without neurogenic claudication   • Increased frequency of urination   • Moderate persistent asthma without complication   • Lesion of vocal cord   • JARETT (acute kidney injury) (HCC)   • Cigarette nicotine dependence in remission   • Chronic cough   • Non-seasonal allergic rhinitis due to pollen   • History of ductal carcinoma in situ (DCIS) of breast   • Prediabetes   • Venous stasis of lower extremity   • Breast cancer in situ     Past Medical History:   Diagnosis Date   • Asthma    • Breast cancer (HCC)    • Breast cancer in situ    • Cancer (HCC)     vocal cord cancer, s/p radiation (2008)   • Chronic kidney disease    • Colon polyp    • COPD (chronic obstructive pulmonary disease) (HCC)    • Disease of thyroid gland    • Environmental allergies    • History of radiation therapy 2008   • Hyperlipidemia    • Hypertension    • Hypothyroidism    • Renal cyst      Past Surgical History:   Procedure Laterality Date   • BREAST BIOPSY Left 06/22/2012    in situ   • BREAST BIOPSY Right 02/11/2022    stereo   • BREAST LUMPECTOMY Left 07/24/2012    Left breast ADH/ALH   • BREAST LUMPECTOMY Right     DCIS cancer Procedure: BREAST LIBBY  DIRECTED LUMPECTOMY;  Surgeon: Angel Carlton MD;  Location: [facility];  Service: Surgical Oncology   • CHOLECYSTECTOMY     • COLONOSCOPY     • MAMMO NEEDLE LOCALIZATION LEFT (ALL INC) Right  2022   • MAMMO STEREOTACTIC BREAST BIOPSY RIGHT (ALL INC) Right 2022   • GA LARGSC W/NJX VOCAL CORD THER W/MICRO/TELESCOPE Left 2019    Procedure: MICROLARYNGOSCOPY AND EXCISION OF LEFT VOCAL CORD LESION;  Surgeon: Dejon Manuel MD;  Location: [facility];  Service: ENT   • SKIN CANCER EXCISION  2011    Squamous cell   • TONSILLECTOMY       Family History   Problem Relation Age of Onset   • Heart disease Mother    • Emphysema Mother    • Diabetes Mother    • Heart disease Father    • No Known Problems Daughter    • No Known Problems Maternal Grandmother    • COPD Maternal Grandfather    • No Known Problems Paternal Grandmother    • Stroke Paternal Grandfather    • No Known Problems Son    • No Known Problems Maternal Aunt    • No Known Problems Paternal Aunt    • No Known Problems Paternal Aunt    • No Known Problems Paternal Aunt      Social History     Socioeconomic History   • Marital status: /Civil Union     Spouse name: Not on file   • Number of children: Not on file   • Years of education: Not on file   • Highest education level: Not on file   Occupational History   • Not on file   Tobacco Use   • Smoking status: Former     Current packs/day: 0.00     Average packs/day: 1 pack/day for 49.0 years (49.0 ttl pk-yrs)     Types: Cigarettes     Start date:      Quit date: 2008     Years since quittin.1   • Smokeless tobacco: Never   Vaping Use   • Vaping status: Never Used   Substance and Sexual Activity   • Alcohol use: Yes     Comment: socially   • Drug use: No   • Sexual activity: Not Currently   Other Topics Concern   • Not on file   Social History Narrative    Drinks 2 cups of coffee a day      Social Determinants of Health     Financial Resource Strain: Not on file   Food Insecurity: Not on file   Transportation Needs: Not on file   Physical Activity: Not on file   Stress: Not on file   Social Connections: Not on file   Intimate Partner Violence: Not on file   Housing  Stability: Not on file       Current Outpatient Medications:   •  albuterol (2.5 mg/3 mL) 0.083 % nebulizer solution, Take 3 mL (2.5 mg total) by nebulization every 6 (six) hours as needed for wheezing or shortness of breath, Disp: 360 mL, Rfl: 1  •  albuterol (Ventolin HFA) 90 mcg/act inhaler, Inhale 2 puffs every 6 (six) hours as needed for wheezing, Disp: 18 g, Rfl: 5  •  Calcium Carbonate-Vitamin D3 600-400 MG-UNIT TABS, Take 1 tablet by mouth daily with dinner , Disp: , Rfl:   •  Cholecalciferol (Vitamin D3) 10 MCG (400 UNIT) CAPS, Take 1 tablet by mouth daily 600mg, Disp: , Rfl:   •  Coenzyme Q10 (CO Q 10) 100 MG CAPS, Take 1 capsule by mouth daily with lunch , Disp: , Rfl:   •  diltiazem (CARDIZEM CD) 240 mg 24 hr capsule, Take 240 mg by mouth daily, Disp: , Rfl:   •  fexofenadine (ALLEGRA) 180 MG tablet, Take 1 tablet by mouth daily in the early morning , Disp: , Rfl:   •  fluticasone (FLONASE) 50 mcg/act nasal spray, 2 sprays into each nostril daily, Disp: 48 mL, Rfl: 3  •  fluticasone-umeclidinium-vilanterol (Trelegy Ellipta) 200-62.5-25 mcg/actuation AEPB inhaler, Inhale 1 puff every day; Rinse mouth after use., Disp: 180 each, Rfl: 3  •  guaiFENesin (MUCINEX) 600 mg 12 hr tablet, Take 1,200 mg by mouth in the morning, Disp: , Rfl:   •  hydrochlorothiazide (HYDRODIURIL) 25 mg tablet, Take 1 tablet by mouth daily, Disp: , Rfl:   •  ipratropium (ATROVENT) 0.06 % nasal spray, 2 sprays into each nostril 3 (three) times a day, Disp: 45 mL, Rfl: 3  •  montelukast (SINGULAIR) 10 mg tablet, Take 1 tablet (10 mg total) by mouth daily, Disp: 90 tablet, Rfl: 3  •  Multiple Vitamins-Minerals (MULTI FOR HER PO), Take 1 tablet by mouth daily with breakfast, Disp: , Rfl:   •  rosuvastatin (CRESTOR) 10 MG tablet, Take 10 mg by mouth daily, Disp: , Rfl:   •  SUPER B COMPLEX/C CAPS, Take 1 capsule by mouth daily with dinner , Disp: , Rfl:   •  benzonatate (TESSALON PERLES) 100 mg capsule, Take 2 capsules (200 mg total) by  mouth 3 (three) times a day as needed for cough (Patient not taking: Reported on 12/8/2023), Disp: 20 capsule, Rfl: 0  •  erythromycin (ILOTYCIN) ophthalmic ointment, APPLY 1 APPLICATION IN RIGHT EYE AT BEDTIME (Patient not taking: Reported on 12/8/2023), Disp: , Rfl:   •  levothyroxine 100 mcg tablet, TAKE 1 TABLET (100 MCG TOTAL) BY MOUTH DAILY. DX CODE E03.9 (Patient not taking: Reported on 3/5/2024), Disp: , Rfl:   •  levothyroxine 112 mcg tablet, Take 112 mcg by mouth daily in the early morning , Disp: , Rfl:   •  predniSONE 10 mg tablet, 4 tabs x 5 days (Patient not taking: Reported on 12/8/2023), Disp: 20 tablet, Rfl: 0  No Known Allergies  Vitals:    03/05/24 1426   BP: 122/70   Pulse: 75   Resp: 15   Temp: 97.6 °F (36.4 °C)   SpO2: 93%       Physical Exam  Constitutional:       General: She is not in acute distress.     Appearance: Normal appearance.   Cardiovascular:      Rate and Rhythm: Normal rate and regular rhythm.      Pulses: Normal pulses.      Heart sounds: Normal heart sounds.   Pulmonary:      Effort: Pulmonary effort is normal.      Breath sounds: Normal breath sounds.   Chest:      Chest wall: No mass.   Breasts:     Right: No swelling, bleeding, inverted nipple, mass, nipple discharge, skin change or tenderness.      Left: No swelling, bleeding, inverted nipple, mass, nipple discharge, skin change or tenderness.       Abdominal:      General: Abdomen is flat.      Palpations: Abdomen is soft.   Lymphadenopathy:      Upper Body:      Right upper body: No supraclavicular, axillary or pectoral adenopathy.      Left upper body: No supraclavicular, axillary or pectoral adenopathy.   Skin:     General: Skin is warm.   Neurological:      General: No focal deficit present.      Mental Status: She is alert and oriented to person, place, and time.   Psychiatric:         Mood and Affect: Mood normal.         Behavior: Behavior normal.           Results:    Imaging  No results found.    I reviewed the  above imaging data.      Advance Care Planning/Advance Directives:  Discussed disease status and treatment goals with the patient.

## 2024-03-23 NOTE — TELEPHONE ENCOUNTER
Assessment & Plan     Miracle was seen today for urgent care and constipation.    Diagnoses and all orders for this visit:    Constipation, unspecified constipation type  -     Trial: polyethylene glycol (MIRALAX) 17 GM/Dose powder; Take 17 g (1 Capful) by mouth daily  -     Increase fluid, fibre in diet and encouraged to be more active.  -     Recommended to follow up for further workup if initial treatment is ineffective. Patient verbalized understanding.      Patient education and Handout with home care instructions given. See AVS for details.      Return in about 1 week (around 3/30/2024), or if symptoms worsen or fail to improve.    Subjective   Miracle is a 25 year old, presenting for the following health issues:  Urgent Care (Urgent care visit for constipation. ) and Constipation (She has been unable to have a normal bowel movement for 1.5 weeks now. She has only been about to have a bowel movement 5 times in 1.5 weeks and only little pebble-sized stool comes out. She is urinating ok with no issues. She does not have a history of constipation. No recent changes in medications or new medications. She has abdominal cramping off and on and is passing gas normally.)    HPI       Concern - constipation  Onset: 1.5 weeks  Description:   Pebble shaped stool  Intensity: moderate  Progression of Symptoms:  same  Accompanying Signs & Symptoms:  Intermittent abdominal cramping. Passing gas. No fever, chills. No N/V. No blood in stool. No unintentional weight loss. No urinary symptoms   Previous history of similar problem:   No   Precipitating factors:   Worsened by: unsure  Alleviating factors:  Improved by: unsure    Therapies Tried and outcome: OTC Laxative-Magnesium citrate - ineffective.     No family history of IBD. No previous known history of Celiac disease/IBS      Review of Systems  Constitutional, HEENT, cardiovascular, pulmonary, gi and gu systems are negative, except as otherwise noted.      Objective   ----- Message from Kimberly Vaughan DO sent at 1/11/2022  9:57 AM EST -----  1/10/22 sCr 1 14, sNa 133, K 3 4  Let the patient know her renal function remains stable  I suspect her sodium and potassium levels are a bit low due to hydrochlorothiazide use  Repeat BMP and magnesium next week  Ensure adequate oral intake and avoidance of excessive water intake  Thanks    /79 (BP Location: Left arm, Patient Position: Sitting, Cuff Size: Adult Regular)   Pulse 77   Temp 98.4  F (36.9  C) (Oral)   Resp 20   Wt 94.1 kg (207 lb 6 oz)   LMP 03/01/2024   SpO2 100%   Breastfeeding No   BMI 33.25 kg/m    Body mass index is 33.25 kg/m .  Physical Exam   GENERAL: alert and no distress  ABDOMEN: soft, nontender, no hepatosplenomegaly, no masses and bowel sounds normal  PSYCH: mentation appears normal, affect normal/bright.          Signed Electronically by: Daja Lopez MD

## 2024-07-29 DIAGNOSIS — J30.1 NON-SEASONAL ALLERGIC RHINITIS DUE TO POLLEN: ICD-10-CM

## 2024-07-29 LAB
CREAT UR-MCNC: 51.1 MG/DL (ref 50–200)
PROT UR-MCNC: 9 MG/DL
PROT/CREAT UR: 0.18

## 2024-07-29 RX ORDER — IPRATROPIUM BROMIDE 42 UG/1
SPRAY, METERED NASAL
Qty: 15 ML | Refills: 6 | Status: SHIPPED | OUTPATIENT
Start: 2024-07-29

## 2024-07-30 LAB
ALBUMIN/CREAT UR: NORMAL
ANION GAP SERPL CALCULATED.3IONS-SCNC: 12 MMOL/L (ref 3–11)
BACTERIA URNS QL MICRO: ABNORMAL
BUN SERPL-MCNC: 11 MG/DL (ref 7–25)
CALCIUM SERPL-MCNC: 9.2 MG/DL (ref 8.5–10.1)
CHLORIDE SERPL-SCNC: 96 MMOL/L (ref 100–109)
CO2 SERPL-SCNC: 31 MMOL/L (ref 21–31)
CREAT SERPL-MCNC: 1.07 MG/DL (ref 0.4–1.1)
CREAT UR-MCNC: 50.7 MG/DL (ref 50–200)
CYTOLOGY CMNT CVX/VAG CYTO-IMP: ABNORMAL
GFR/BSA.PRED SERPLBLD CYS-BASED-ARV: 53 ML/MIN/{1.73_M2}
GLUCOSE SERPL-MCNC: 94 MG/DL (ref 65–99)
GLUCOSE UR QL STRIP: NEGATIVE MG/DL
HGB UR QL STRIP: NEGATIVE MG/DL
KETONES UR QL STRIP: NEGATIVE MG/DL
LEUKOCYTE ESTERASE UR QL STRIP: 500 /UL
MICROALBUMIN UR-MCNC: <0.7 MG/DL
MUCOUS THREADS URNS QL MICRO: ABNORMAL
NITRITE UR QL STRIP: NEGATIVE
PH UR: 8 [PH] (ref 4.5–8)
POTASSIUM SERPL-SCNC: 3.4 MMOL/L (ref 3.5–5.2)
PROT 24H UR-MRATE: NEGATIVE MG/DL
RBC #/AREA URNS HPF: ABNORMAL /HPF (ref 0–2)
SL AMB POCT URINE COMMENT: ABNORMAL
SODIUM SERPL-SCNC: 139 MMOL/L (ref 135–145)
SP GR UR: 1.01 (ref 1–1.03)
SQUAMOUS #/AREA URNS HPF: ABNORMAL /LPF (ref 0–5)
TRANS CELLS #/AREA URNS HPF: ABNORMAL /LPF (ref 0–1)
WBC #/AREA URNS HPF: ABNORMAL /HPF (ref 0–5)

## 2024-07-30 NOTE — RESULT ENCOUNTER NOTE
Left detailed message for pt about his labs     Alta Haider, DO   Potassium level low, kidney function stable. Is the patient still taking hydrochlorothiazide? Low potassium is a side effect with this medication. Is she taking any potassium supplementation?     Asked pt to please call us back to see if he is taking hydrochlorothiazide and/or potassium supplements.

## 2024-07-31 ENCOUNTER — TELEPHONE (OUTPATIENT)
Age: 79
End: 2024-07-31

## 2024-07-31 DIAGNOSIS — I10 ESSENTIAL HYPERTENSION: ICD-10-CM

## 2024-07-31 DIAGNOSIS — N18.30 STAGE 3 CHRONIC KIDNEY DISEASE, UNSPECIFIED WHETHER STAGE 3A OR 3B CKD (HCC): Primary | ICD-10-CM

## 2024-07-31 DIAGNOSIS — N28.1 RENAL CYST, ACQUIRED, RIGHT: ICD-10-CM

## 2024-07-31 NOTE — TELEPHONE ENCOUNTER
Patient called back in regards to the message about her labs as follows:    Alta Haider, DO  7/30/2024  1:03 PM EDT Back to Top      Potassium level low, kidney function stable. Is the patient still taking hydrochlorothiazide? Low potassium is a side effect with this medication. Is she taking any potassium supplementation?       Patients states that she does take HCTZ and does not take any potassium supplements.  She said she knows why her potassium is low, because her  is on a very strict kidney diet and is restricted on potassium and she eats what he eats. She said she is going to get some Gatorade today.    Patient also states that she goes to her PCP on Friday but wanted to also ask Dr Haider if she can take magnesium gummies for constipation?  She states that she has been taking milk of magnesia currently.  Please advise and call patient.  She said it is ok to leave a detailed message if she does not answer.

## 2024-07-31 NOTE — TELEPHONE ENCOUNTER
Spoke to pt about the following     She may allow more potassium in her diet, and may take magnesium gummies. She should repeat BMP in 1-2 weeks along with magnesium level at that time. Please order the labs. Thanks.    Dr. Haider    Pt verbalized understanding. I have placed the orders in the system and have mailed her the labs as well.

## 2024-08-13 LAB
ANION GAP SERPL CALCULATED.3IONS-SCNC: 11 MMOL/L (ref 3–11)
BUN SERPL-MCNC: 12 MG/DL (ref 7–25)
CALCIUM SERPL-MCNC: 9.4 MG/DL (ref 8.5–10.1)
CHLORIDE SERPL-SCNC: 93 MMOL/L (ref 100–109)
CO2 SERPL-SCNC: 31 MMOL/L (ref 21–31)
CREAT SERPL-MCNC: 1.02 MG/DL (ref 0.4–1.1)
CYTOLOGY CMNT CVX/VAG CYTO-IMP: ABNORMAL
GFR/BSA.PRED SERPLBLD CYS-BASED-ARV: 56 ML/MIN/{1.73_M2}
GLUCOSE SERPL-MCNC: 103 MG/DL (ref 65–99)
POTASSIUM SERPL-SCNC: 3.7 MMOL/L (ref 3.5–5.2)
SODIUM SERPL-SCNC: 135 MMOL/L (ref 135–145)

## 2024-09-11 ENCOUNTER — TELEPHONE (OUTPATIENT)
Dept: SURGICAL ONCOLOGY | Facility: CLINIC | Age: 79
End: 2024-09-11

## 2024-09-11 NOTE — TELEPHONE ENCOUNTER
Patient no showed for her appointment with Any Avilez on 9/11/2024.  Left a message on patient's voicemail to call our office to reschedule her missed appointment.

## 2024-09-17 ENCOUNTER — OFFICE VISIT (OUTPATIENT)
Dept: PULMONOLOGY | Facility: CLINIC | Age: 79
End: 2024-09-17
Payer: COMMERCIAL

## 2024-09-17 VITALS
SYSTOLIC BLOOD PRESSURE: 122 MMHG | HEART RATE: 45 BPM | OXYGEN SATURATION: 95 % | TEMPERATURE: 99.1 F | DIASTOLIC BLOOD PRESSURE: 66 MMHG

## 2024-09-17 DIAGNOSIS — J30.1 NON-SEASONAL ALLERGIC RHINITIS DUE TO POLLEN: ICD-10-CM

## 2024-09-17 DIAGNOSIS — J44.9 CHRONIC OBSTRUCTIVE PULMONARY DISEASE, UNSPECIFIED COPD TYPE (HCC): Primary | ICD-10-CM

## 2024-09-17 PROCEDURE — 99214 OFFICE O/P EST MOD 30 MIN: CPT | Performed by: INTERNAL MEDICINE

## 2024-09-17 RX ORDER — ALBUTEROL SULFATE 90 UG/1
2 INHALANT RESPIRATORY (INHALATION) EVERY 6 HOURS PRN
Qty: 18 G | Refills: 5 | Status: SHIPPED | OUTPATIENT
Start: 2024-09-17

## 2024-09-17 NOTE — PROGRESS NOTES
Pulmonary Follow Up Note   Lori Samano 79 y.o. 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 Previous asthma. 50 pack year history. PFTs showing severe obstruction with an FEV1 of 46%. Currently still controlled with Trelegy and PRN albuterol. This time of year can be difficult for her breathing, but still not using albuterol very frequently. Continue for now. The patient was given refills.    -     albuterol (Ventolin HFA) 90 mcg/act inhaler; Inhale 2 puffs every 6 (six) hours as needed for wheezing    Non-seasonal allergic rhinitis due to pollen  Patient currently controlled on flonase and atrovent. Doing well. Continue for now.    Return in about 1 year (around 9/17/2025).    History of Present Illness   HPI:  Lori Samano is a 79 y.o. female with a PMHx of severe COPD on Trelegy, seasonal allergies, HTN, CKD stage 3, breast cancer s/p 2 lumpectomies who presents today for follow-up.    The patient is doing well today overall, but she has some trouble with her allergies this time of year. Reports having to use her rescue inhaler slightly more, especially when she went out to see her son in [place]. She has not had to use her nebulizer.    The patient uses both flonase and atrovent nasal sprays, which has helped her allergic rhinitis.    Previous smoker, about 50 py, quit in 2008. History of asthma and COPD. No significant family history. No occupational exposures, but did have passive exposure living in [place]. Does not travel. No birds at home. No down pillows or comforters.    Labs personally reviewed.    Imaging personally reviewed.    PCP note reviewed.    Review of Systems   Constitutional:  Negative for chills and fatigue.   HENT:  Negative for congestion, postnasal drip, rhinorrhea and sinus pain.    Respiratory:  Negative for  cough and shortness of breath.    Cardiovascular:  Negative for chest pain.   Gastrointestinal:  Negative for abdominal distention, abdominal pain, constipation and diarrhea.   Genitourinary:  Negative for dysuria.   Musculoskeletal:  Negative for arthralgias and myalgias.   Skin:  Negative for rash.   Neurological:  Negative for dizziness and headaches.   Psychiatric/Behavioral:  Negative for agitation and confusion.        Historical Information   Past Medical History:   Diagnosis Date    Asthma     Breast cancer (HCC)     Breast cancer in situ     Cancer (HCC)     vocal cord cancer, s/p radiation (2008)    Chronic kidney disease     Colon polyp     COPD (chronic obstructive pulmonary disease) (HCC)     Disease of thyroid gland     Environmental allergies     History of radiation therapy 2008    Hyperlipidemia     Hypertension     Hypothyroidism     Renal cyst      Past Surgical History:   Procedure Laterality Date    BREAST BIOPSY Left 06/22/2012    in situ    BREAST BIOPSY Right 02/11/2022    stereo    BREAST LUMPECTOMY Left 07/24/2012    Left breast ADH/ALH    BREAST LUMPECTOMY Right     DCIS cancer Procedure: BREAST LIBBY  DIRECTED LUMPECTOMY;  Surgeon: Angel Carlton MD;  [facility];  Service: Surgical Oncology    CHOLECYSTECTOMY      COLONOSCOPY      MAMMO NEEDLE LOCALIZATION LEFT (ALL INC) Right 03/16/2022    MAMMO STEREOTACTIC BREAST BIOPSY RIGHT (ALL INC) Right 02/11/2022    AR LARGSC W/NJX VOCAL CORD THER W/MICRO/TELESCOPE Left 03/29/2019    Procedure: MICROLARYNGOSCOPY AND EXCISION OF LEFT VOCAL CORD LESION;  Surgeon: Dejon Manuel MD;  Location[facility];  Service: ENT    SKIN CANCER EXCISION  07/07/2011    Squamous cell    TONSILLECTOMY       Family History   Problem Relation Age of Onset    Heart disease Mother     Emphysema Mother     Diabetes Mother     Heart disease Father     No Known Problems Daughter     No Known Problems Maternal Grandmother     COPD Maternal Grandfather     No Known  Problems Paternal Grandmother     Stroke Paternal Grandfather     No Known Problems Son     No Known Problems Maternal Aunt     No Known Problems Paternal Aunt     No Known Problems Paternal Aunt     No Known Problems Paternal Aunt      Social History     Tobacco Use   Smoking Status Former    Current packs/day: 0.00    Average packs/day: 1 pack/day for 49.0 years (49.0 ttl pk-yrs)    Types: Cigarettes    Start date:     Quit date: 2008    Years since quittin.7   Smokeless Tobacco Never       Meds/Allergies     Current Outpatient Medications:     albuterol (2.5 mg/3 mL) 0.083 % nebulizer solution, Take 3 mL (2.5 mg total) by nebulization every 6 (six) hours as needed for wheezing or shortness of breath, Disp: 360 mL, Rfl: 1    albuterol (Ventolin HFA) 90 mcg/act inhaler, Inhale 2 puffs every 6 (six) hours as needed for wheezing, Disp: 18 g, Rfl: 5    Calcium Carbonate-Vitamin D3 600-400 MG-UNIT TABS, Take 1 tablet by mouth daily with dinner , Disp: , Rfl:     Cholecalciferol (Vitamin D3) 10 MCG (400 UNIT) CAPS, Take 1 tablet by mouth daily 600mg, Disp: , Rfl:     Coenzyme Q10 (CO Q 10) 100 MG CAPS, Take 1 capsule by mouth daily with lunch , Disp: , Rfl:     diltiazem (CARDIZEM CD) 240 mg 24 hr capsule, Take 240 mg by mouth daily, Disp: , Rfl:     fexofenadine (ALLEGRA) 180 MG tablet, Take 1 tablet by mouth daily in the early morning , Disp: , Rfl:     fluticasone (FLONASE) 50 mcg/act nasal spray, 2 sprays into each nostril daily, Disp: 48 mL, Rfl: 3    fluticasone-umeclidinium-vilanterol (Trelegy Ellipta) 200-62.5-25 mcg/actuation AEPB inhaler, Inhale 1 puff every day; Rinse mouth after use., Disp: 180 each, Rfl: 3    guaiFENesin (MUCINEX) 600 mg 12 hr tablet, Take 1,200 mg by mouth in the morning, Disp: , Rfl:     hydrochlorothiazide (HYDRODIURIL) 25 mg tablet, Take 1 tablet by mouth daily, Disp: , Rfl:     ipratropium (ATROVENT) 0.06 % nasal spray, SPRAY 2 SPRAYS INTO EACH NOSTRIL 4 TIMES A DAY. NOT  COVERED, Disp: 15 mL, Rfl: 6    levothyroxine 112 mcg tablet, Take 112 mcg by mouth daily in the early morning , Disp: , Rfl:     montelukast (SINGULAIR) 10 mg tablet, Take 1 tablet (10 mg total) by mouth daily, Disp: 90 tablet, Rfl: 3    Multiple Vitamins-Minerals (MULTI FOR HER PO), Take 1 tablet by mouth daily with breakfast, Disp: , Rfl:     rosuvastatin (CRESTOR) 10 MG tablet, Take 10 mg by mouth daily, Disp: , Rfl:     SUPER B COMPLEX/C CAPS, Take 1 capsule by mouth daily with dinner , Disp: , Rfl:     benzonatate (TESSALON PERLES) 100 mg capsule, Take 2 capsules (200 mg total) by mouth 3 (three) times a day as needed for cough (Patient not taking: Reported on 12/8/2023), Disp: 20 capsule, Rfl: 0    erythromycin (ILOTYCIN) ophthalmic ointment, APPLY 1 APPLICATION IN RIGHT EYE AT BEDTIME (Patient not taking: Reported on 12/8/2023), Disp: , Rfl:     levothyroxine 100 mcg tablet, TAKE 1 TABLET (100 MCG TOTAL) BY MOUTH DAILY. DX CODE E03.9 (Patient not taking: Reported on 3/5/2024), Disp: , Rfl:     predniSONE 10 mg tablet, 4 tabs x 5 days (Patient not taking: Reported on 12/8/2023), Disp: 20 tablet, Rfl: 0  No Known Allergies    Vitals: Blood pressure 122/66, pulse (!) 45, temperature 99.1 °F (37.3 °C), temperature source Tympanic, SpO2 95%. There is no height or weight on file to calculate BMI. Oxygen Therapy  SpO2: 95 %  Oxygen Therapy: None (Room air)      Physical Exam  Physical Exam  Vitals reviewed.   Constitutional:       General: She is not in acute distress.  HENT:      Head: Normocephalic and atraumatic.      Right Ear: External ear normal.      Left Ear: External ear normal.      Nose: Nose normal.      Mouth/Throat:      Mouth: Mucous membranes are moist.      Pharynx: Oropharynx is clear.   Eyes:      Extraocular Movements: Extraocular movements intact.      Pupils: Pupils are equal, round, and reactive to light.   Cardiovascular:      Rate and Rhythm: Normal rate and regular rhythm.      Pulses:  "Normal pulses.      Heart sounds: Normal heart sounds.   Pulmonary:      Effort: Pulmonary effort is normal.      Breath sounds: Normal breath sounds.   Abdominal:      General: Abdomen is flat. Bowel sounds are normal. There is no distension.      Tenderness: There is no abdominal tenderness.   Musculoskeletal:      Right lower leg: No edema.      Left lower leg: No edema.   Skin:     General: Skin is warm and dry.      Capillary Refill: Capillary refill takes less than 2 seconds.   Neurological:      General: No focal deficit present.      Mental Status: She is alert and oriented to person, place, and time.   Psychiatric:         Mood and Affect: Mood normal.         Behavior: Behavior normal.         Labs: I have personally reviewed pertinent lab results.  Lab Results   Component Value Date    WBC 8.46 03/03/2022    HGB 14.6 03/03/2022    HCT 44.3 03/03/2022    MCV 96 03/03/2022     03/03/2022     Lab Results   Component Value Date    CALCIUM 9.4 08/13/2024    K 3.7 08/13/2024    CO2 31 08/13/2024    CL 93 (L) 08/13/2024    BUN 12 08/13/2024    CREATININE 1.02 08/13/2024     No results found for: \"IGE\"  Lab Results   Component Value Date    ALT 18 05/04/2023    AST 18 05/04/2023    ALKPHOS 74 05/04/2023     Preventive   Influenza vaccine: Needed  COVID-19 vaccination: Per PCP  Pneumonia vaccine: Needed  Lung Cancer screening: N/A      Imaging and other studies: Personally reviewed the following image studies in PACS and associated radiology reports: chest xray. My interpretation of the radiology images/reports is: WNL.  Mammo diagnostic bilateral w 3d & cad    Result Date: 11/10/2023  Impression:  Benign findings. ASSESSMENT/BI-RADS CATEGORY:  Overall: 2 - Benign RECOMMENDATION:      - Diagnostic mammogram in 1 year for both breasts. Workstation ID: [id number]     Pulmonary function testing:     Date of Testing: 10/20/21     Date of Interpretation: 10/20/21     Requesting Physician: Dr. Espinoza   " "  Reason for Testing: COPD     Reference set for interpretation: [id number]     Procedure: The patient was taken to pulmonary function testing laboratory.  The patient demonstrated good effort and cooperation.  The results of this test meet ATS standards for acceptability and repeatability.  Data set appears appropriate for interpretation.     Post bronchodilator testing performed after the administration of 2.5mg albuterol in 3cc normal saline administered via nebulizer per bronchodilator protocol.     Results:  FEV1/FVC Ratio: 49 %  Forced Vital Capacity: 2.12 L    77 % predicted  FEV1: 1.04 L48 % predicted     After administration of bronchodilator   FVC: 2.06 L, 75% predicted, -3% change  FEV1: 0.97L, 44% predicted, -6% change     Lung volumes by body plethysmography:   Total Lung Capacity 101 % predicted   Residual volume 127 % predicted     DLCO corrected for patients hemoglobin level: 49 %     Interpretation:     Severe obstructive airflow defect      No significant response to the administration to bronchodilator per ATS Standards     Normal TLC with increased residual volume indicative of air trapping     Moderate reduction in diffusion capacity     Flow-volume loop suggestive of obstruction    EKG, Pathology, and Other Studies: Personally reviewed the following image studies in PACS and associated radiology reports: chest xray. My interpretation of the radiology images/reports is: WNL.    Disclaimer: Portions of the record may have been created with voice recognition software. Occasional wrong word or \"sound a like\" substitutions may have occurred due to the inherent limitations of voice recognition software. Careful consideration should be taken to recognize, using context, where substitutions have occurred.    Rohan Cantrell DO, MS  Pulmonary & Critical Care Medicine Fellow PGY-V  [facility]    "

## 2024-11-12 ENCOUNTER — HOSPITAL ENCOUNTER (OUTPATIENT)
Dept: MAMMOGRAPHY | Facility: CLINIC | Age: 79
Discharge: HOME | End: 2024-11-12
Payer: COMMERCIAL

## 2024-11-12 VITALS — WEIGHT: 165 LBS | HEIGHT: 63 IN | BODY MASS INDEX: 29.23 KG/M2

## 2024-11-12 DIAGNOSIS — Z86.000 HISTORY OF DUCTAL CARCINOMA IN SITU (DCIS) OF BREAST: ICD-10-CM

## 2024-11-12 PROCEDURE — G0279 TOMOSYNTHESIS, MAMMO: HCPCS

## 2024-11-12 PROCEDURE — 77066 DX MAMMO INCL CAD BI: CPT

## 2024-12-29 DIAGNOSIS — J44.9 COPD, SEVERE (HCC): ICD-10-CM

## 2025-01-02 RX ORDER — FLUTICASONE FUROATE, UMECLIDINIUM BROMIDE AND VILANTEROL TRIFENATATE 200; 62.5; 25 UG/1; UG/1; UG/1
POWDER RESPIRATORY (INHALATION)
Qty: 60 EACH | Refills: 11 | Status: SHIPPED | OUTPATIENT
Start: 2025-01-02

## 2025-01-02 NOTE — TELEPHONE ENCOUNTER
Requested medication(s) are due for refill today: Yes  Patient has already received a courtesy refill: No  Other reason request has been forwarded to provider: clerical: f/u needed

## 2025-01-02 NOTE — TELEPHONE ENCOUNTER
Spoke with patient in regards to scheduling an appt to get medication refilled. Patient stated that this is just a script issues and that she does not need an appt. Stated that the medication is dispensed 1 at a time as the cost for 3 at a time is to much for her as it would be 154 dollars eat. (As stated by patient) I informed patient that we would like to schedule an appt to refill the medication but patient insisted she didn't need one and just needs the script sent to the [facility] pharmacy. At this time I was unable to schedule patient for an appt.

## 2025-01-16 ENCOUNTER — OFFICE VISIT (OUTPATIENT)
Dept: NEPHROLOGY | Facility: CLINIC | Age: 80
End: 2025-01-16
Payer: COMMERCIAL

## 2025-01-16 VITALS
HEART RATE: 66 BPM | SYSTOLIC BLOOD PRESSURE: 134 MMHG | HEIGHT: 63 IN | WEIGHT: 163 LBS | BODY MASS INDEX: 28.88 KG/M2 | DIASTOLIC BLOOD PRESSURE: 64 MMHG

## 2025-01-16 DIAGNOSIS — I10 ESSENTIAL HYPERTENSION: ICD-10-CM

## 2025-01-16 DIAGNOSIS — N28.1 RENAL CYST, ACQUIRED, RIGHT: ICD-10-CM

## 2025-01-16 DIAGNOSIS — N18.31 STAGE 3A CHRONIC KIDNEY DISEASE (HCC): Primary | ICD-10-CM

## 2025-01-16 PROBLEM — N17.9 AKI (ACUTE KIDNEY INJURY) (HCC): Status: RESOLVED | Noted: 2019-09-27 | Resolved: 2025-01-16

## 2025-01-16 PROCEDURE — 99214 OFFICE O/P EST MOD 30 MIN: CPT | Performed by: INTERNAL MEDICINE

## 2025-01-16 RX ORDER — MAGNESIUM 30 MG
250 TABLET ORAL DAILY
COMMUNITY

## 2025-01-16 NOTE — ASSESSMENT & PLAN NOTE
likely d/t aging kidney +/- hypertension nephrosclerosis  - sCr 1.02 as of 8/13/24. sCr ranges 1.1-1.3, stable since 2016  -UA and urine microscopy shows leukocyte esterase and few bacteria, otherwise bland  -renal ultrasound shows normal kidneys with right renal cyst  -CKD may be due to HTN nephrosclerosis +/- physiologic CKD of aging  -urine protein:Cr negligible as of June 2023  - FLC 1.55 - acceptable for CKD, SPEP/UPEP negative for monoclonal gammopathy  -as no significant proteinuria or hematuria on UA, will defer glomerular work up  -f/u BMP, UA, UpCr and microalb:Cr in 6 months and again in 12 months prior to next office visit  -low risk for progression of CKD

## 2025-01-16 NOTE — PATIENT INSTRUCTIONS
Stage 3a chronic kidney disease (HCC)  likely d/t aging kidney +/- hypertension nephrosclerosis  - sCr 1.02 as of 8/13/24. sCr ranges 1.1-1.3, stable since 2016  -UA and urine microscopy shows leukocyte esterase and few bacteria, otherwise bland  -renal ultrasound shows normal kidneys with right renal cyst  -CKD may be due to HTN nephrosclerosis +/- physiologic CKD of aging  -urine protein:Cr negligible as of June 2023  - FLC 1.55 - acceptable for CKD, SPEP/UPEP negative for monoclonal gammopathy  -as no significant proteinuria or hematuria on UA, will defer glomerular work up  -f/u BMP, UA, UpCr and microalb:Cr in 6 months and again in 12 months prior to next office visit  -low risk for progression of CKD  Essential hypertension  - BP well controlled for age/CKD  -on low salt diet  -continue diltiazem XR 240mg daily, hydrochlorothiazide 25mg daily  -off lisinopril  -Should monitor BP at home. If BP > 140/90 consistently call the nephrology office.  Renal cyst, acquired, right  - stable as of renal ultrasound as of Feb. 2019. Size 1.7-1.8cm. As cyst stable, will defer further imaging for now.     RTC in 1 year.  Obtain blood and urine testing in 6 months and again prior to next office visit in 1 year.   None

## 2025-01-16 NOTE — ASSESSMENT & PLAN NOTE
- BP well controlled for age/CKD  -on low salt diet  -continue diltiazem XR 240mg daily, hydrochlorothiazide 25mg daily  -off lisinopril  -Should monitor BP at home. If BP > 140/90 consistently call the nephrology office.

## 2025-01-16 NOTE — PROGRESS NOTES
NEPHROLOGY OUTPATIENT PROGRESS NOTE   Lori Samano 79 y.o. female MRN: [id number]  DATE: 1/16/2025  Reason for visit:   Chief Complaint   Patient presents with    Chronic Kidney Disease    Follow-up        Patient Instructions   Stage 3a chronic kidney disease (HCC)  likely d/t aging kidney +/- hypertension nephrosclerosis  - sCr 1.02 as of 8/13/24. sCr ranges 1.1-1.3, stable since 2016  -UA and urine microscopy shows leukocyte esterase and few bacteria, otherwise bland  -renal ultrasound shows normal kidneys with right renal cyst  -CKD may be due to HTN nephrosclerosis +/- physiologic CKD of aging  -urine protein:Cr negligible as of June 2023  - FLC 1.55 - acceptable for CKD, SPEP/UPEP negative for monoclonal gammopathy  -as no significant proteinuria or hematuria on UA, will defer glomerular work up  -f/u BMP, UA, UpCr and microalb:Cr in 6 months and again in 12 months prior to next office visit  -low risk for progression of CKD  Essential hypertension  - BP well controlled for age/CKD  -on low salt diet  -continue diltiazem XR 240mg daily, hydrochlorothiazide 25mg daily  -off lisinopril  -Should monitor BP at home. If BP > 140/90 consistently call the nephrology office.  Renal cyst, acquired, right  - stable as of renal ultrasound as of Feb. 2019. Size 1.7-1.8cm. As cyst stable, will defer further imaging for now.     RTC in 1 year.  Obtain blood and urine testing in 6 months and again prior to next office visit in 1 year.      Assessment & Plan  Stage 3a chronic kidney disease (HCC)  likely d/t aging kidney +/- hypertension nephrosclerosis  - sCr 1.02 as of 8/13/24. sCr ranges 1.1-1.3, stable since 2016  -UA and urine microscopy shows leukocyte esterase and few bacteria, otherwise bland  -renal ultrasound shows normal kidneys with right renal cyst  -CKD may be due to HTN nephrosclerosis +/- physiologic CKD of aging  -urine protein:Cr negligible as of June 2023  - FLC 1.55 - acceptable for CKD, SPEP/UPEP  "negative for monoclonal gammopathy  -as no significant proteinuria or hematuria on UA, will defer glomerular work up  -f/u BMP, UA, UpCr and microalb:Cr in 6 months and again in 12 months prior to next office visit  -low risk for progression of CKD  Essential hypertension  - BP well controlled for age/CKD  -on low salt diet  -continue diltiazem XR 240mg daily, hydrochlorothiazide 25mg daily  -off lisinopril  -Should monitor BP at home. If BP > 140/90 consistently call the nephrology office.  Renal cyst, acquired, right  - stable as of renal ultrasound as of Feb. 2019. Size 1.7-1.8cm. As cyst stable, will defer further imaging for now.     RTC in 1 year.  Obtain blood and urine testing in 6 months and again prior to next office visit in 1 year.    SUBJECTIVE / INTERVAL HISTORY:  79 y.o. female presents in follow up of CKD.     Lori Samano denies any recent illness/hospitalizations/medication changes since last office visit.    Denies NSAID use.  BP has been a bit high but today. BP normally well controlled.     Review of Systems   Constitutional:  Negative for chills and fever.   HENT:  Negative for sore throat and trouble swallowing.    Eyes:  Negative for visual disturbance.   Respiratory:  Negative for cough and shortness of breath.    Cardiovascular:  Negative for chest pain and leg swelling.   Gastrointestinal:  Positive for constipation. Negative for abdominal pain, diarrhea, nausea and vomiting.   Endocrine: Negative for polyuria.   Genitourinary:  Negative for difficulty urinating, dysuria and hematuria.   Musculoskeletal:  Negative for back pain and neck pain.   Skin:  Negative for rash.   Neurological:  Negative for dizziness, light-headedness and numbness.   Psychiatric/Behavioral:  The patient is not nervous/anxious.        OBJECTIVE:  /64 (BP Location: Left arm, Patient Position: Sitting, Cuff Size: Standard)   Pulse 66   Ht 5' 3\" (1.6 m)   Wt 73.9 kg (163 lb)   BMI 28.87 kg/m²  Body mass " index is 28.87 kg/m².    Physical exam:  Physical Exam  Vitals and nursing note reviewed.   Constitutional:       General: She is not in acute distress.     Appearance: Normal appearance. She is well-developed. She is obese. She is not diaphoretic.   HENT:      Head: Normocephalic and atraumatic.      Nose: Nose normal.      Mouth/Throat:      Mouth: Mucous membranes are moist.      Pharynx: No oropharyngeal exudate.   Eyes:      General: No scleral icterus.        Right eye: No discharge.         Left eye: No discharge.      Comments: eyeglasses   Neck:      Thyroid: No thyromegaly.   Cardiovascular:      Rate and Rhythm: Normal rate and regular rhythm.      Heart sounds: No murmur heard.  Pulmonary:      Effort: Pulmonary effort is normal. No respiratory distress.      Breath sounds: Normal breath sounds. No wheezing.   Abdominal:      General: Bowel sounds are normal. There is no distension.      Palpations: Abdomen is soft.   Musculoskeletal:         General: No swelling. Normal range of motion.      Cervical back: Normal range of motion and neck supple.   Skin:     General: Skin is warm and dry.      Coloration: Skin is not jaundiced.      Findings: No rash.   Neurological:      General: No focal deficit present.      Mental Status: She is alert.      Motor: No abnormal muscle tone.      Comments: awake   Psychiatric:         Mood and Affect: Mood normal.         Behavior: Behavior normal.         Medications:    Current Outpatient Medications:     albuterol (2.5 mg/3 mL) 0.083 % nebulizer solution, Take 3 mL (2.5 mg total) by nebulization every 6 (six) hours as needed for wheezing or shortness of breath, Disp: 360 mL, Rfl: 1    albuterol (Ventolin HFA) 90 mcg/act inhaler, Inhale 2 puffs every 6 (six) hours as needed for wheezing, Disp: 18 g, Rfl: 5    Calcium Carbonate-Vitamin D3 600-400 MG-UNIT TABS, Take 1 tablet by mouth daily with dinner , Disp: , Rfl:     Cholecalciferol (Vitamin D3) 10 MCG (400 UNIT)  CAPS, Take 1 tablet by mouth daily 600mg, Disp: , Rfl:     Coenzyme Q10 (CO Q 10) 100 MG CAPS, Take 1 capsule by mouth daily with lunch , Disp: , Rfl:     diltiazem (CARDIZEM CD) 240 mg 24 hr capsule, Take 240 mg by mouth daily, Disp: , Rfl:     fexofenadine (ALLEGRA) 180 MG tablet, Take 1 tablet by mouth daily in the early morning , Disp: , Rfl:     fluticasone (FLONASE) 50 mcg/act nasal spray, 2 sprays into each nostril daily, Disp: 48 mL, Rfl: 3    hydrochlorothiazide (HYDRODIURIL) 25 mg tablet, Take 1 tablet by mouth daily, Disp: , Rfl:     ipratropium (ATROVENT) 0.06 % nasal spray, SPRAY 2 SPRAYS INTO EACH NOSTRIL 4 TIMES A DAY. NOT COVERED, Disp: 15 mL, Rfl: 6    levothyroxine 112 mcg tablet, Take 112 mcg by mouth daily in the early morning , Disp: , Rfl:     magnesium 30 MG tablet, Take 250 mg by mouth in the morning, Disp: , Rfl:     montelukast (SINGULAIR) 10 mg tablet, Take 1 tablet (10 mg total) by mouth daily, Disp: 90 tablet, Rfl: 3    Multiple Vitamins-Minerals (MULTI FOR HER PO), Take 1 tablet by mouth daily with breakfast, Disp: , Rfl:     rosuvastatin (CRESTOR) 10 MG tablet, Take 10 mg by mouth daily, Disp: , Rfl:     SUPER B COMPLEX/C CAPS, Take 1 capsule by mouth daily with dinner , Disp: , Rfl:     Trelegy Ellipta 200-62.5-25 MCG/ACT AEPB inhaler, INHALE 1 PUFF EVERY DAY RINSE MOUTH AFTER USE, Disp: 60 each, Rfl: 11    benzonatate (TESSALON PERLES) 100 mg capsule, Take 2 capsules (200 mg total) by mouth 3 (three) times a day as needed for cough (Patient not taking: Reported on 12/8/2023), Disp: 20 capsule, Rfl: 0    erythromycin (ILOTYCIN) ophthalmic ointment, APPLY 1 APPLICATION IN RIGHT EYE AT BEDTIME (Patient not taking: Reported on 12/8/2023), Disp: , Rfl:     guaiFENesin (MUCINEX) 600 mg 12 hr tablet, Take 1,200 mg by mouth in the morning (Patient not taking: Reported on 11/19/2024), Disp: , Rfl:     levothyroxine 100 mcg tablet, TAKE 1 TABLET (100 MCG TOTAL) BY MOUTH DAILY. DX CODE E03.9  "(Patient not taking: Reported on 3/5/2024), Disp: , Rfl:     predniSONE 10 mg tablet, 4 tabs x 5 days (Patient not taking: Reported on 12/8/2023), Disp: 20 tablet, Rfl: 0    Allergies:  Allergies as of 01/16/2025    (No Known Allergies)       The following portions of the patient's history were reviewed and updated as appropriate: past family history, past surgical history and problem list.    Laboratory Results:  Lab Results   Component Value Date    SODIUM 135 08/13/2024    K 3.7 08/13/2024    CL 93 (L) 08/13/2024    CO2 31 08/13/2024    BUN 12 08/13/2024    CREATININE 1.02 08/13/2024    GLUC 103 (H) 08/13/2024    CALCIUM 9.4 08/13/2024        Lab Results   Component Value Date    CALCIUM 9.4 08/13/2024    PHOS 3.6 12/28/2023       Portions of the record may have been created with voice recognition software.  Occasional wrong word or \"sound a like\" substitutions may have occurred due to the inherent limitations of voice recognition software.  Read the chart carefully and recognize, using context, where substitutions have occurred.  "

## 2025-01-16 NOTE — ASSESSMENT & PLAN NOTE
- stable as of renal ultrasound as of Feb. 2019. Size 1.7-1.8cm. As cyst stable, will defer further imaging for now.

## 2025-02-04 LAB
ALBUMIN/CREAT UR: NORMAL
ANION GAP SERPL CALCULATED.3IONS-SCNC: 8 MMOL/L (ref 3–11)
BUN SERPL-MCNC: 14 MG/DL (ref 7–25)
CALCIUM SERPL-MCNC: 9.6 MG/DL (ref 8.5–10.5)
CHLORIDE SERPL-SCNC: 98 MMOL/L (ref 100–109)
CO2 SERPL-SCNC: 35 MMOL/L (ref 21–31)
CREAT SERPL-MCNC: 1.14 MG/DL (ref 0.4–1.1)
CREAT UR-MCNC: 50.2 MG/DL (ref 50–200)
CREAT UR-MCNC: 50.7 MG/DL (ref 50–200)
CYTOLOGY CMNT CVX/VAG CYTO-IMP: ABNORMAL
GFR/BSA.PRED SERPLBLD CYS-BASED-ARV: 49 ML/MIN/{1.73_M2}
GLUCOSE SERPL-MCNC: 102 MG/DL (ref 65–99)
MICROALBUMIN UR-MCNC: <0.7 MG/DL
POTASSIUM SERPL-SCNC: 3.9 MMOL/L (ref 3.5–5.2)
PROT UR-MCNC: 5 MG/DL
PROT/CREAT UR: 0.1
SODIUM SERPL-SCNC: 141 MMOL/L (ref 135–145)

## 2025-02-05 LAB
GLUCOSE UR QL STRIP: NEGATIVE MG/DL
HGB UR QL STRIP: NEGATIVE MG/DL
KETONES UR QL STRIP: NEGATIVE MG/DL
LEUKOCYTE ESTERASE UR QL STRIP: 500 /UL
NITRITE UR QL STRIP: NEGATIVE
PH UR: 8 [PH] (ref 4.5–8)
PROT 24H UR-MRATE: NEGATIVE MG/DL
RBC #/AREA URNS HPF: ABNORMAL /HPF (ref 0–2)
RENAL EPI CELLS #/AREA URNS HPF: ABNORMAL /HPF (ref 0–1)
SL AMB POCT URINE COMMENT: ABNORMAL
SP GR UR: 1.01 (ref 1–1.03)
SQUAMOUS #/AREA URNS HPF: ABNORMAL /LPF (ref 0–5)
WBC #/AREA URNS HPF: ABNORMAL /HPF (ref 0–5)

## 2025-02-17 DIAGNOSIS — J45.909 UNCOMPLICATED ASTHMA, UNSPECIFIED ASTHMA SEVERITY, UNSPECIFIED WHETHER PERSISTENT: ICD-10-CM

## 2025-02-17 RX ORDER — MONTELUKAST SODIUM 10 MG/1
10 TABLET ORAL DAILY
Qty: 90 TABLET | Refills: 1 | Status: SHIPPED | OUTPATIENT
Start: 2025-02-17

## 2025-02-17 NOTE — TELEPHONE ENCOUNTER
Reason for call:   [x] Refill   [] Prior Auth  [] Other:     Office:   [] PCP/Provider -   [x] Specialty/Provider - Pulmonary    Medication: montelukast (SINGULAIR) 10 mg tablet     Dose/Frequency:  Take 1 tablet (10 mg total) by mouth daily,     Quantity: 90 tablet    Pharmacy: [facility]/pharmacy #[id number] - [place], [place]      Does the patient have enough for 3 days?   [x] Yes   [] No - Send as HP to POD

## 2025-04-02 ENCOUNTER — OFFICE VISIT (OUTPATIENT)
Dept: SURGICAL ONCOLOGY | Facility: CLINIC | Age: 80
End: 2025-04-02
Payer: COMMERCIAL

## 2025-04-02 VITALS
BODY MASS INDEX: 29.06 KG/M2 | SYSTOLIC BLOOD PRESSURE: 122 MMHG | WEIGHT: 164 LBS | TEMPERATURE: 97.3 F | HEART RATE: 82 BPM | DIASTOLIC BLOOD PRESSURE: 68 MMHG | OXYGEN SATURATION: 98 % | HEIGHT: 63 IN

## 2025-04-02 DIAGNOSIS — N60.92 ATYPICAL DUCTAL HYPERPLASIA OF LEFT BREAST: ICD-10-CM

## 2025-04-02 DIAGNOSIS — Z08 ENCOUNTER FOR FOLLOW-UP EXAMINATION AFTER COMPLETED TREATMENT FOR MALIGNANT NEOPLASM: Primary | ICD-10-CM

## 2025-04-02 DIAGNOSIS — Z86.000 HISTORY OF DUCTAL CARCINOMA IN SITU (DCIS) OF BREAST: ICD-10-CM

## 2025-04-02 DIAGNOSIS — Z12.31 VISIT FOR SCREENING MAMMOGRAM: ICD-10-CM

## 2025-04-02 PROCEDURE — 99214 OFFICE O/P EST MOD 30 MIN: CPT

## 2025-04-02 NOTE — PROGRESS NOTES
Name: Lori Samano      : 1945      MRN: [id number]  Encounter Provider: MAGALI Crawford  Encounter Date: 2025   Encounter department: [facility]  :  Assessment & Plan  Encounter for follow-up examination after completed treatment for malignant neoplasm  - 1 year f/u  History of ductal carcinoma in situ (DCIS) of breast  Patient is a 79-year-old female presenting for a follow-up for right breast cancer diagnosed in 2022. Pathology revealed DCIS ER/WA positive. She underwent a right breast lumpectomy with Dr. Carlton. She elected to not pursue radiation therapy. She also has a history of atypical hyperplasia in the left breast which was excised in 2022. She had a b/l dx mammogram on 24 which was BIRADS 2, category 1 density. Screening mammo is ordered for November. There were no concerns on her cbe.Patient denies changes on her breast exam. She denies persistent headaches, bone pain, back pain, shortness of breath, or abdominal pain. I will see the patient back in 1 year or sooner should the need arise. She was instructed to call with any questions or concerns prior to this time. All questions were answered today.   Atypical ductal hyperplasia of left breast    Visit for screening mammogram  Orders:  •  Mammo screening bilateral w 3d and cad; Future        Oncology History   Cancer Staging   No matching staging information was found for the patient.  Oncology History   Atypical ductal hyperplasia of left breast   2012 Surgery    Left lumpectomy  Atypical ductal hyperplasia  Atypical lobular hyperplasia  Negative for malignancy     History of ductal carcinoma in situ (DCIS) of breast   2022 Biopsy    Right breast stereotactic biopsy  10 o'clock, middle  Ductal carcinoma in situ  Grade 2  , WA 90    Concordant. Malignancy may be locally multifocal, spanning 2 cm. US right axilla not performed. Left breast clear on  "11/2021 mammo.     3/22/2022 Surgery    Right breast LIBBY  directed lumpectomy  Ductal carcinoma in situ  Grade 2  1.4 cm  Margins negative  0/1 Regional lymph node  Stage 0     4/7/2022 Genomic Testing    DCISion RT high risk (7.3)  10 year total risk with breast conserving surgery alone 31%  10 year total risk with breast conserving surgery and radiation 9%          Review of Systems   Constitutional:  Negative for activity change, appetite change, fatigue and unexpected weight change.   Respiratory:  Negative for cough and shortness of breath.    Cardiovascular:  Negative for chest pain.   Gastrointestinal:  Negative for abdominal pain, diarrhea, nausea and vomiting.   Endocrine: Negative for heat intolerance.   Musculoskeletal:  Negative for arthralgias, back pain and myalgias.   Skin:  Negative for rash.   Neurological:  Negative for weakness and headaches.   Hematological:  Negative for adenopathy.    A complete review of systems is negative other than that noted above in the HPI.      Objective   /68 (BP Location: Left arm, Patient Position: Sitting, Cuff Size: Standard)   Pulse 82   Temp (!) 97.3 °F (36.3 °C) (Temporal)   Ht 5' 3\" (1.6 m)   Wt 74.4 kg (164 lb)   SpO2 98%   BMI 29.05 kg/m²     Pain Screening:  Pain Score: 0-No pain  ECOG    Physical Exam  Constitutional:       General: She is not in acute distress.     Appearance: Normal appearance.   Cardiovascular:      Rate and Rhythm: Normal rate and regular rhythm.      Pulses: Normal pulses.      Heart sounds: Normal heart sounds.   Pulmonary:      Effort: Pulmonary effort is normal.      Breath sounds: Normal breath sounds.   Chest:      Chest wall: No mass.   Breasts:     Right: No swelling, bleeding, inverted nipple, mass, nipple discharge, skin change or tenderness.      Left: No swelling, bleeding, inverted nipple, mass, nipple discharge, skin change or tenderness.       Abdominal:      General: Abdomen is flat.      Palpations: " Abdomen is soft.   Lymphadenopathy:      Upper Body:      Right upper body: No supraclavicular, axillary or pectoral adenopathy.      Left upper body: No supraclavicular, axillary or pectoral adenopathy.   Skin:     General: Skin is warm.   Neurological:      General: No focal deficit present.      Mental Status: She is alert and oriented to person, place, and time.   Psychiatric:         Mood and Affect: Mood normal.         Behavior: Behavior normal.

## 2025-04-02 NOTE — ASSESSMENT & PLAN NOTE
Patient is a 79-year-old female presenting for a follow-up for right breast cancer diagnosed in February 2022. Pathology revealed DCIS ER/HI positive. She underwent a right breast lumpectomy with Dr. Carlton. She elected to not pursue radiation therapy. She also has a history of atypical hyperplasia in the left breast which was excised in March 2022. She had a b/l dx mammogram on 11/12/24 which was BIRADS 2, category 1 density. Screening mammo is ordered for November. There were no concerns on her cbe.Patient denies changes on her breast exam. She denies persistent headaches, bone pain, back pain, shortness of breath, or abdominal pain. I will see the patient back in 1 year or sooner should the need arise. She was instructed to call with any questions or concerns prior to this time. All questions were answered today.

## 2025-05-02 PROBLEM — Z08 ENCOUNTER FOR FOLLOW-UP EXAMINATION AFTER COMPLETED TREATMENT FOR MALIGNANT NEOPLASM: Status: RESOLVED | Noted: 2025-04-02 | Resolved: 2025-05-02

## 2025-05-20 DIAGNOSIS — J30.1 NON-SEASONAL ALLERGIC RHINITIS DUE TO POLLEN: ICD-10-CM

## 2025-05-20 NOTE — TELEPHONE ENCOUNTER
Reason for call:   [x] Refill   [] Prior Auth  [] Other:     Office:   [] PCP/Provider -   [x] Specialty/Provider - PULMONARY ASSCORA OMER     Medication: ipratropium (ATROVENT) 0.06 % nasal spray     Dose/Frequency: SPRAY 2 SPRAYS INTO EACH NOSTRIL 4 TIMES A DAY     Quantity: 15 mL    Pharmacy: [id number]    Local Pharmacy   Does the patient have enough for 3 days?   [x] Yes   [] No - Send as HP to POD    Mail Away Pharmacy   Does the patient have enough for 10 days?   [] Yes   [] No - Send as HP to POD

## 2025-05-21 RX ORDER — IPRATROPIUM BROMIDE 42 UG/1
2 SPRAY, METERED NASAL 3 TIMES DAILY
Qty: 15 ML | Refills: 0 | Status: SHIPPED | OUTPATIENT
Start: 2025-05-21

## 2025-06-02 DIAGNOSIS — J45.909 UNCOMPLICATED ASTHMA, UNSPECIFIED ASTHMA SEVERITY, UNSPECIFIED WHETHER PERSISTENT: ICD-10-CM

## 2025-06-02 RX ORDER — MONTELUKAST SODIUM 10 MG/1
10 TABLET ORAL DAILY
Qty: 90 TABLET | Refills: 1 | Status: SHIPPED | OUTPATIENT
Start: 2025-06-02

## 2025-06-13 DIAGNOSIS — J30.1 NON-SEASONAL ALLERGIC RHINITIS DUE TO POLLEN: ICD-10-CM

## 2025-06-13 RX ORDER — IPRATROPIUM BROMIDE 42 UG/1
SPRAY, METERED NASAL
Refills: 1 | OUTPATIENT
Start: 2025-06-13

## 2025-06-27 DIAGNOSIS — J30.1 NON-SEASONAL ALLERGIC RHINITIS DUE TO POLLEN: ICD-10-CM

## 2025-06-27 DIAGNOSIS — J45.40 MODERATE PERSISTENT ASTHMA WITHOUT COMPLICATION: ICD-10-CM

## 2025-06-27 DIAGNOSIS — J44.9 COPD, SEVERE (HCC): ICD-10-CM

## 2025-06-27 RX ORDER — FLUTICASONE PROPIONATE 50 MCG
2 SPRAY, SUSPENSION (ML) NASAL DAILY
Qty: 48 ML | Refills: 0 | Status: SHIPPED | OUTPATIENT
Start: 2025-06-27

## 2025-06-27 RX ORDER — IPRATROPIUM BROMIDE 42 UG/1
2 SPRAY, METERED NASAL 3 TIMES DAILY
Qty: 15 ML | Refills: 2 | OUTPATIENT
Start: 2025-06-27

## 2025-06-27 NOTE — TELEPHONE ENCOUNTER
Reason for call:   [x] Refill   [] Prior Auth  [] Other:     Office:   [] PCP/Provider -   [x] Specialty/Provider -  MAGALI Mahoney     Medication:    ATROVENT) 0.06 % nasal spray / 2 sprays tid / 15 ml  FLONASE) 50 mcg / 2 sprays daily / 48 ml        Pharmacy: [facility]/pharmacy #[id number] - [place], [place] - [address] [place] [address]      Local Pharmacy   Does the patient have enough for 3 days?   [] Yes   [x] No - Send as HP to POD    Mail Away Pharmacy   Does the patient have enough for 10 days?   [] Yes   [] No - Send as HP to POD

## 2025-06-30 RX ORDER — IPRATROPIUM BROMIDE 42 UG/1
2 SPRAY, METERED NASAL 3 TIMES DAILY
Qty: 15 ML | Refills: 0 | Status: SHIPPED | OUTPATIENT
Start: 2025-06-30

## 2025-06-30 NOTE — TELEPHONE ENCOUNTER
Pt states pharmacy received script for Flonase but not for Atrovent. Please send script to [facility] pharmacy for Atrovent. Medication pended.

## 2025-07-24 DIAGNOSIS — J30.1 NON-SEASONAL ALLERGIC RHINITIS DUE TO POLLEN: ICD-10-CM

## 2025-07-25 RX ORDER — IPRATROPIUM BROMIDE 42 UG/1
SPRAY, METERED NASAL
Qty: 45 ML | Refills: 1 | Status: SHIPPED | OUTPATIENT
Start: 2025-07-25

## 2025-07-25 NOTE — TELEPHONE ENCOUNTER
Requested medication(s) are due for refill today: Yes  Patient has already received a courtesy refill: No  Other reason request has been forwarded to provider: please see pharmacy comment, patient has upcoming appt for 8/22 with rogelio

## 2025-08-22 ENCOUNTER — OFFICE VISIT (OUTPATIENT)
Dept: PULMONOLOGY | Facility: CLINIC | Age: 80
End: 2025-08-22
Payer: COMMERCIAL

## 2025-08-22 VITALS
HEIGHT: 63 IN | HEART RATE: 72 BPM | SYSTOLIC BLOOD PRESSURE: 100 MMHG | BODY MASS INDEX: 28.88 KG/M2 | WEIGHT: 163 LBS | DIASTOLIC BLOOD PRESSURE: 60 MMHG | OXYGEN SATURATION: 95 % | TEMPERATURE: 97.1 F

## 2025-08-22 DIAGNOSIS — J45.40 MODERATE PERSISTENT ASTHMA WITHOUT COMPLICATION: ICD-10-CM

## 2025-08-22 DIAGNOSIS — J30.1 NON-SEASONAL ALLERGIC RHINITIS DUE TO POLLEN: ICD-10-CM

## 2025-08-22 DIAGNOSIS — J44.9 COPD, SEVERE (HCC): Primary | ICD-10-CM

## 2025-08-22 PROCEDURE — 99213 OFFICE O/P EST LOW 20 MIN: CPT | Performed by: INTERNAL MEDICINE

## (undated) DEVICE — SUT VICRYL 3-0 SH 27 IN J416H

## (undated) DEVICE — CHLORAPREP HI-LITE 26ML ORANGE

## (undated) DEVICE — DRAPE PROBE NEO-PROBE/ULTRASOUND

## (undated) DEVICE — PENCIL ELECTROSURG E-Z CLEAN -0035H

## (undated) DEVICE — 3M™ STERI-STRIP™ REINFORCED ADHESIVE SKIN CLOSURES, R1541, 1/4 IN X 3 IN (6 MM X 75 MM), 3 STRIPS/ENVELOPE: Brand: 3M™ STERI-STRIP™

## (undated) DEVICE — TUBING SUCTION 5MM X 12 FT

## (undated) DEVICE — SMOKE EVACUATION TUBING WITH 8 IN INTEGRAL WAND AND SPONGE GUARD: Brand: BUFFALO FILTER

## (undated) DEVICE — MEDI-VAC YANK SUCT HNDL W/TPRD BULBOUS TIP: Brand: CARDINAL HEALTH

## (undated) DEVICE — GLOVE SRG BIOGEL 7

## (undated) DEVICE — BETHLEHEM UNIVERSAL MINOR GEN: Brand: CARDINAL HEALTH

## (undated) DEVICE — TELFA NON-ADHERENT ABSORBENT DRESSING: Brand: TELFA

## (undated) DEVICE — ADHESIVE SKIN HIGH VISCOSITY EXOFIN 1ML

## (undated) DEVICE — 3M™ STERI-STRIP™ REINFORCED ADHESIVE SKIN CLOSURES, R1547, 1/2 IN X 4 IN (12 MM X 100 MM), 6 STRIPS/ENVELOPE: Brand: 3M™ STERI-STRIP™

## (undated) DEVICE — IV BUTTERFLY 25G SAFETY

## (undated) DEVICE — BETHLEHEM UNIVERSAL OUTPATIENT: Brand: CARDINAL HEALTH

## (undated) DEVICE — SYRINGE 1ML TB 26G X 3/8 SAFETY

## (undated) DEVICE — SUT MONOCRYL 4-0 PS-2 18 IN Y496G

## (undated) DEVICE — VIAL DECANTER

## (undated) DEVICE — NEEDLE 25G X 1 1/2

## (undated) DEVICE — NEEDLE 18 G X 1 1/2 SAFETY

## (undated) DEVICE — DRAPE EQUIPMENT RF WAND